# Patient Record
Sex: MALE | Race: WHITE | NOT HISPANIC OR LATINO | Employment: FULL TIME | URBAN - METROPOLITAN AREA
[De-identification: names, ages, dates, MRNs, and addresses within clinical notes are randomized per-mention and may not be internally consistent; named-entity substitution may affect disease eponyms.]

---

## 2017-01-09 RX ORDER — TAMSULOSIN HYDROCHLORIDE 0.4 MG/1
CAPSULE ORAL
Qty: 90 CAPSULE | Refills: 2 | Status: SHIPPED | OUTPATIENT
Start: 2017-01-09 | End: 2017-01-20 | Stop reason: SDUPTHER

## 2017-01-09 RX ORDER — ATORVASTATIN CALCIUM 10 MG/1
TABLET, FILM COATED ORAL
Qty: 90 TABLET | Refills: 2 | Status: SHIPPED | OUTPATIENT
Start: 2017-01-09 | End: 2017-01-20 | Stop reason: SDUPTHER

## 2017-01-20 ENCOUNTER — OFFICE VISIT (OUTPATIENT)
Dept: INTERNAL MEDICINE | Facility: CLINIC | Age: 52
End: 2017-01-20
Payer: COMMERCIAL

## 2017-01-20 ENCOUNTER — LAB VISIT (OUTPATIENT)
Dept: LAB | Facility: HOSPITAL | Age: 52
End: 2017-01-20
Attending: FAMILY MEDICINE
Payer: COMMERCIAL

## 2017-01-20 VITALS
TEMPERATURE: 99 F | SYSTOLIC BLOOD PRESSURE: 100 MMHG | OXYGEN SATURATION: 95 % | WEIGHT: 193.56 LBS | DIASTOLIC BLOOD PRESSURE: 70 MMHG | BODY MASS INDEX: 32.25 KG/M2 | HEART RATE: 93 BPM | HEIGHT: 65 IN

## 2017-01-20 DIAGNOSIS — H69.91 ETD (EUSTACHIAN TUBE DYSFUNCTION), RIGHT: ICD-10-CM

## 2017-01-20 DIAGNOSIS — E78.5 HYPERLIPIDEMIA, UNSPECIFIED HYPERLIPIDEMIA TYPE: ICD-10-CM

## 2017-01-20 DIAGNOSIS — Z00.00 ANNUAL PHYSICAL EXAM: Primary | ICD-10-CM

## 2017-01-20 DIAGNOSIS — Z12.5 PROSTATE CANCER SCREENING: ICD-10-CM

## 2017-01-20 DIAGNOSIS — F17.200 SMOKER: ICD-10-CM

## 2017-01-20 DIAGNOSIS — I10 HYPERTENSION, ESSENTIAL: ICD-10-CM

## 2017-01-20 DIAGNOSIS — Z00.00 ANNUAL PHYSICAL EXAM: ICD-10-CM

## 2017-01-20 DIAGNOSIS — R80.9 PROTEINURIA, UNSPECIFIED TYPE: ICD-10-CM

## 2017-01-20 LAB
ALBUMIN SERPL BCP-MCNC: 3.8 G/DL
ALP SERPL-CCNC: 81 U/L
ALT SERPL W/O P-5'-P-CCNC: 41 U/L
ANION GAP SERPL CALC-SCNC: 9 MMOL/L
AST SERPL-CCNC: 24 U/L
BILIRUB SERPL-MCNC: 0.5 MG/DL
BILIRUB UR QL STRIP: NEGATIVE
BUN SERPL-MCNC: 12 MG/DL
CALCIUM SERPL-MCNC: 9.9 MG/DL
CHLORIDE SERPL-SCNC: 107 MMOL/L
CHOLEST/HDLC SERPL: 6.4 {RATIO}
CLARITY UR REFRACT.AUTO: CLEAR
CO2 SERPL-SCNC: 22 MMOL/L
COLOR UR AUTO: YELLOW
COMPLEXED PSA SERPL-MCNC: 3.6 NG/ML
CREAT SERPL-MCNC: 0.9 MG/DL
EST. GFR  (AFRICAN AMERICAN): >60 ML/MIN/1.73 M^2
EST. GFR  (NON AFRICAN AMERICAN): >60 ML/MIN/1.73 M^2
GLUCOSE SERPL-MCNC: 101 MG/DL
GLUCOSE UR QL STRIP: NEGATIVE
HDL/CHOLESTEROL RATIO: 15.5 %
HDLC SERPL-MCNC: 206 MG/DL
HDLC SERPL-MCNC: 32 MG/DL
HGB UR QL STRIP: NEGATIVE
KETONES UR QL STRIP: NEGATIVE
LDLC SERPL CALC-MCNC: 94.6 MG/DL
LEUKOCYTE ESTERASE UR QL STRIP: NEGATIVE
NITRITE UR QL STRIP: NEGATIVE
NONHDLC SERPL-MCNC: 174 MG/DL
PH UR STRIP: 7 [PH] (ref 5–8)
POTASSIUM SERPL-SCNC: 3.9 MMOL/L
PROT SERPL-MCNC: 7.6 G/DL
PROT UR QL STRIP: NEGATIVE
SODIUM SERPL-SCNC: 138 MMOL/L
SP GR UR STRIP: 1.02 (ref 1–1.03)
TRIGL SERPL-MCNC: 397 MG/DL
URN SPEC COLLECT METH UR: NORMAL
UROBILINOGEN UR STRIP-ACNC: NEGATIVE EU/DL

## 2017-01-20 PROCEDURE — 80061 LIPID PANEL: CPT

## 2017-01-20 PROCEDURE — 3078F DIAST BP <80 MM HG: CPT | Mod: S$GLB,,, | Performed by: FAMILY MEDICINE

## 2017-01-20 PROCEDURE — 99396 PREV VISIT EST AGE 40-64: CPT | Mod: S$GLB,,, | Performed by: FAMILY MEDICINE

## 2017-01-20 PROCEDURE — 80053 COMPREHEN METABOLIC PANEL: CPT

## 2017-01-20 PROCEDURE — 84153 ASSAY OF PSA TOTAL: CPT

## 2017-01-20 PROCEDURE — 36415 COLL VENOUS BLD VENIPUNCTURE: CPT

## 2017-01-20 PROCEDURE — 99999 PR PBB SHADOW E&M-EST. PATIENT-LVL IV: CPT | Mod: PBBFAC,,, | Performed by: FAMILY MEDICINE

## 2017-01-20 PROCEDURE — 81003 URINALYSIS AUTO W/O SCOPE: CPT

## 2017-01-20 PROCEDURE — 3074F SYST BP LT 130 MM HG: CPT | Mod: S$GLB,,, | Performed by: FAMILY MEDICINE

## 2017-01-20 RX ORDER — ATORVASTATIN CALCIUM 10 MG/1
10 TABLET, FILM COATED ORAL DAILY
Qty: 90 TABLET | Refills: 2 | Status: SHIPPED | OUTPATIENT
Start: 2017-01-20 | End: 2017-10-02 | Stop reason: SDUPTHER

## 2017-01-20 RX ORDER — LISINOPRIL 10 MG/1
10 TABLET ORAL DAILY
Qty: 90 TABLET | Refills: 3 | Status: SHIPPED | OUTPATIENT
Start: 2017-01-20 | End: 2018-02-12 | Stop reason: SDUPTHER

## 2017-01-20 RX ORDER — TAMSULOSIN HYDROCHLORIDE 0.4 MG/1
0.4 CAPSULE ORAL DAILY
Qty: 90 CAPSULE | Refills: 2 | Status: SHIPPED | OUTPATIENT
Start: 2017-01-20 | End: 2017-02-25 | Stop reason: SDUPTHER

## 2017-01-20 NOTE — MR AVS SNAPSHOT
[facility]  [address]  [place] [address]  Phone: [phone]  Fax: [phone]                  Nanda Colin III   2017 3:20 PM   Office Visit    Description:  Male : 1965   Provider:  Horace Weber MD   Department:  [facility]           Reason for Visit     Annual Exam           Diagnoses this Visit        Comments    Annual physical exam    -  Primary     Hypertension, essential         Hyperlipidemia, unspecified hyperlipidemia type         Smoker         Proteinuria, unspecified type         Prostate cancer screening         ETD (eustachian tube dysfunction), right                To Do List           Goals (5 Years of Data)     None      Follow-Up and Disposition     Return in about 1 year (around 2018) for annual physical exam, Call or message through Portal for status update., Keep appointments with specialty providers..    Follow-up and Disposition History       These Medications        Disp Refills Start End    atorvastatin (LIPITOR) 10 MG tablet 90 tablet 2 2017     Take 1 tablet (10 mg total) by mouth once daily. - Oral    Pharmacy: [facility]/pharmacy [address] [place] [address]. Ph #: [phone]       lisinopril 10 MG tablet 90 tablet 3 2017     Take 1 tablet (10 mg total) by mouth once daily. - Oral    Pharmacy: [facility]/pharmacy [address] [place] [address]. Ph #: [phone]       tamsulosin (FLOMAX) 0.4 mg Cp24 90 capsule 2 2017     Take 1 capsule (0.4 mg total) by mouth once daily. - Oral    Pharmacy: [facility]/pharmacy [address] [place] [address] [address] [place] [address]. Ph #: [phone]         [facility]     [facility] On Call Nurse Care Line -  Assistance  Registered nurses in the [facility] On Call Center provide clinical advisement, health education, appointment booking, and other advisory services.  Call for this free service at [phone].             Medications  "          Message regarding Medications     Verify the changes and/or additions to your medication regime listed below are the same as discussed with your clinician today.  If any of these changes or additions are incorrect, please notify your healthcare provider.        CHANGE how you are taking these medications     Start Taking Instead of    atorvastatin (LIPITOR) 10 MG tablet atorvastatin (LIPITOR) 10 MG tablet    Dosage:  Take 1 tablet (10 mg total) by mouth once daily. Dosage:  TAKE 1 TABLET (10 MG TOTAL) BY MOUTH ONCE DAILY.    Reason for Change:  Reorder     tamsulosin (FLOMAX) 0.4 mg Cp24 tamsulosin (FLOMAX) 0.4 mg Cp24    Dosage:  Take 1 capsule (0.4 mg total) by mouth once daily. Dosage:  TAKE 1 CAPSULE (0.4 MG TOTAL) BY MOUTH ONCE DAILY.    Reason for Change:  Reorder            Verify that the below list of medications is an accurate representation of the medications you are currently taking.  If none reported, the list may be blank. If incorrect, please contact your healthcare provider. Carry this list with you in case of emergency.           Current Medications     atorvastatin (LIPITOR) 10 MG tablet Take 1 tablet (10 mg total) by mouth once daily.    lisinopril 10 MG tablet Take 1 tablet (10 mg total) by mouth once daily.    tamsulosin (FLOMAX) 0.4 mg Cp24 Take 1 capsule (0.4 mg total) by mouth once daily.    triamcinolone acetonide 0.1% (KENALOG) 0.1 % cream Apply topically 2 (two) times daily.           Clinical Reference Information           Vital Signs - Last Recorded  Most recent update: 1/20/2017  3:52 PM by Lida Hampton MA    BP Pulse Temp Ht Wt SpO2    100/70 93 98.5 °F (36.9 °C) 5' 5" (1.651 m) 87.8 kg (193 lb 9 oz) 95%    BMI                32.21 kg/m2          Blood Pressure          Most Recent Value    BP  100/70      Allergies as of 1/20/2017     No Known Allergies      Immunizations Administered on Date of Encounter - 1/20/2017     None      Orders Placed During Today's Visit      " Normal Orders This Visit    Ambulatory referral to ENT     Urinalysis     Future Labs/Procedures Expected by Expires    Comprehensive metabolic panel  1/20/2017 1/20/2018    Lipid panel  1/20/2017 1/20/2018    PSA, Screening  1/20/2017 1/20/2018      Instructions      http://www.nhlbi.nih.gov/health/health-topics/topics/hbc    http://www.nhlbi.nih.gov/health/educational/lose_wt/index.htm    http://www.nhlbi.nih.gov/files/docs/public/heart/hbp_low.pdf

## 2017-01-20 NOTE — PATIENT INSTRUCTIONS
http://www.nhlbi.nih.gov/health/health-topics/topics/hbc    http://www.nhlbi.nih.gov/health/educational/lose_wt/index.htm    http://www.nhlbi.nih.gov/files/docs/public/heart/hbp_low.pdf

## 2017-01-20 NOTE — PROGRESS NOTES
Subjective:       Patient ID: Nanda Colin III is a 51 y.o. male.    Chief Complaint: Annual Exam    HPI Comments: Established patient for an annual wellness check/physical exam. No specific complaints, please see ROS.      Review of Systems   Constitutional: Negative for chills, fatigue and fever.   HENT: Negative for congestion and trouble swallowing.    Eyes: Negative for redness.   Respiratory: Negative for cough, chest tightness and shortness of breath.    Cardiovascular: Negative for chest pain, palpitations and leg swelling.   Gastrointestinal: Negative for abdominal pain and blood in stool.   Genitourinary: Negative for hematuria.   Musculoskeletal: Negative for arthralgias, back pain, gait problem, joint swelling, myalgias and neck pain.   Skin: Negative for color change and rash.   Neurological: Negative for tremors, speech difficulty, weakness, numbness and headaches.   Hematological: Negative for adenopathy. Does not bruise/bleed easily.   Psychiatric/Behavioral: Negative for behavioral problems, confusion and sleep disturbance. The patient is not nervous/anxious.        Objective:      Physical Exam   Constitutional: He appears well-developed and well-nourished.   HENT:   Head: Normocephalic.   Right Ear: External ear and ear canal normal. Tympanic membrane is injected and retracted. Decreased hearing is noted.   Left Ear: Tympanic membrane, external ear and ear canal normal.   Mouth/Throat: Oropharynx is clear and moist.   Eyes: Pupils are equal, round, and reactive to light.   Neck: Normal range of motion. Neck supple. Carotid bruit is not present. No thyromegaly present.   Cardiovascular: Normal rate, regular rhythm, normal heart sounds and intact distal pulses.  Exam reveals no gallop and no friction rub.    No murmur heard.  Pulmonary/Chest: Effort normal and breath sounds normal.   Abdominal: Soft. Bowel sounds are normal. He exhibits no distension and no mass. There is no tenderness. There is  no rebound and no guarding.   Musculoskeletal: Normal range of motion. He exhibits no edema or tenderness.   Lymphadenopathy:     He has no cervical adenopathy.   Neurological: He is alert. He has normal strength. He displays normal reflexes. No cranial nerve deficit or sensory deficit. Coordination and gait normal.   Skin: Skin is warm and dry.   Psychiatric: He has a normal mood and affect. His behavior is normal. Judgment and thought content normal.   Nursing note and vitals reviewed.      Assessment:       1. Annual physical exam    2. Hypertension, essential    3. Hyperlipidemia, unspecified hyperlipidemia type    4. Smoker    5. Proteinuria, unspecified type    6. Prostate cancer screening    7. ETD (eustachian tube dysfunction), right        Plan:   Nanda was seen today for annual exam.    Diagnoses and all orders for this visit:    Annual physical exam  -     Comprehensive metabolic panel; Future  -     Lipid panel; Future  -     PSA, Screening; Future  -     Urinalysis    Hypertension, essential    Hyperlipidemia, unspecified hyperlipidemia type    Smoker    Proteinuria, unspecified type  -     Urinalysis    Prostate cancer screening  -     PSA, Screening; Future    ETD (eustachian tube dysfunction), right  -     Ambulatory referral to ENT    Other orders  -     atorvastatin (LIPITOR) 10 MG tablet; Take 1 tablet (10 mg total) by mouth once daily.  -     lisinopril 10 MG tablet; Take 1 tablet (10 mg total) by mouth once daily.  -     tamsulosin (FLOMAX) 0.4 mg Cp24; Take 1 capsule (0.4 mg total) by mouth once daily.      See meds, orders, follow up, routing and instructions sections of encounter.  A 51-year-old established male patient, annual physical.  He had a cold and has   stuffiness and/or pain in the right ear with decreased hearing.  He has a   negative Valsalva on that side with redness and retraction.  Discussed diet,   exercise, medications, etc.  ENT consult.      ANTONIO/IN  dd: 01/20/2017  18:21:07 (CST)  td: 01/21/2017 05:11:33 (CST)  Doc ID   #[id number]  Job ID #[id number]    CC:

## 2017-02-06 ENCOUNTER — TELEPHONE (OUTPATIENT)
Dept: INTERNAL MEDICINE | Facility: CLINIC | Age: 52
End: 2017-02-06

## 2017-02-07 ENCOUNTER — TELEPHONE (OUTPATIENT)
Dept: INTERNAL MEDICINE | Facility: CLINIC | Age: 52
End: 2017-02-07

## 2017-02-08 ENCOUNTER — TELEPHONE (OUTPATIENT)
Dept: INTERNAL MEDICINE | Facility: CLINIC | Age: 52
End: 2017-02-08

## 2017-02-08 DIAGNOSIS — R97.20 RISING PSA LEVEL: Primary | ICD-10-CM

## 2017-02-08 DIAGNOSIS — E78.5 HYPERLIPIDEMIA, UNSPECIFIED HYPERLIPIDEMIA TYPE: ICD-10-CM

## 2017-02-08 NOTE — TELEPHONE ENCOUNTER
Spoke with patient scheduled his appt with Urologist and lab appt in 6 months and appt letters were mailed.  Message routed to Dr. Weber.

## 2017-02-08 NOTE — TELEPHONE ENCOUNTER
Called patient and discussed labs and or test results. Patient expressed understanding and had the opportunity to ask questions. Any questions were answered. See meds, orders, follow up and instructions sections of encounter.    Specific issues include:  Rising PSA - urology consult    Discussed cholesterol -discussed diet, exercise and fishoil caps - recheck in 6 months

## 2017-02-25 RX ORDER — TAMSULOSIN HYDROCHLORIDE 0.4 MG/1
CAPSULE ORAL
Qty: 90 CAPSULE | Refills: 0 | Status: SHIPPED | OUTPATIENT
Start: 2017-02-25 | End: 2017-10-05 | Stop reason: SDUPTHER

## 2017-06-13 ENCOUNTER — TELEPHONE (OUTPATIENT)
Dept: SMOKING CESSATION | Facility: CLINIC | Age: 52
End: 2017-06-13

## 2017-06-14 ENCOUNTER — TELEPHONE (OUTPATIENT)
Dept: SMOKING CESSATION | Facility: CLINIC | Age: 52
End: 2017-06-14

## 2017-06-15 ENCOUNTER — TELEPHONE (OUTPATIENT)
Dept: SMOKING CESSATION | Facility: CLINIC | Age: 52
End: 2017-06-15

## 2017-08-21 ENCOUNTER — OFFICE VISIT (OUTPATIENT)
Dept: UROLOGY | Facility: CLINIC | Age: 52
End: 2017-08-21
Payer: COMMERCIAL

## 2017-08-21 VITALS
DIASTOLIC BLOOD PRESSURE: 75 MMHG | HEART RATE: 106 BPM | WEIGHT: 193 LBS | HEIGHT: 65 IN | SYSTOLIC BLOOD PRESSURE: 112 MMHG | BODY MASS INDEX: 32.15 KG/M2

## 2017-08-21 DIAGNOSIS — N40.1 BPH WITH URINARY OBSTRUCTION: ICD-10-CM

## 2017-08-21 DIAGNOSIS — Z12.5 PROSTATE CANCER SCREENING: ICD-10-CM

## 2017-08-21 DIAGNOSIS — N13.8 BPH WITH URINARY OBSTRUCTION: ICD-10-CM

## 2017-08-21 DIAGNOSIS — R79.9 NONSPECIFIC FINDINGS ON EXAMINATION OF BLOOD: Primary | ICD-10-CM

## 2017-08-21 LAB
BILIRUB SERPL-MCNC: ABNORMAL MG/DL
BLOOD URINE, POC: ABNORMAL
COLOR, POC UA: ABNORMAL
GLUCOSE UR QL STRIP: ABNORMAL
KETONES UR QL STRIP: ABNORMAL
LEUKOCYTE ESTERASE URINE, POC: ABNORMAL
NITRITE, POC UA: ABNORMAL
PH, POC UA: 5
PROTEIN, POC: ABNORMAL
SPECIFIC GRAVITY, POC UA: ABNORMAL
UROBILINOGEN, POC UA: ABNORMAL

## 2017-08-21 PROCEDURE — 87086 URINE CULTURE/COLONY COUNT: CPT

## 2017-08-21 PROCEDURE — 51798 US URINE CAPACITY MEASURE: CPT | Mod: S$GLB,,, | Performed by: UROLOGY

## 2017-08-21 PROCEDURE — 99215 OFFICE O/P EST HI 40 MIN: CPT | Mod: 25,S$GLB,, | Performed by: UROLOGY

## 2017-08-21 PROCEDURE — 99999 PR PBB SHADOW E&M-EST. PATIENT-LVL III: CPT | Mod: PBBFAC,,, | Performed by: UROLOGY

## 2017-08-21 PROCEDURE — 81001 URINALYSIS AUTO W/SCOPE: CPT | Mod: S$GLB,,, | Performed by: UROLOGY

## 2017-08-21 PROCEDURE — 3078F DIAST BP <80 MM HG: CPT | Mod: S$GLB,,, | Performed by: UROLOGY

## 2017-08-21 PROCEDURE — 3074F SYST BP LT 130 MM HG: CPT | Mod: S$GLB,,, | Performed by: UROLOGY

## 2017-08-21 PROCEDURE — 3008F BODY MASS INDEX DOCD: CPT | Mod: S$GLB,,, | Performed by: UROLOGY

## 2017-08-21 NOTE — PROGRESS NOTES
HPI:  Nanda Colin III is a 52 y.o. year old male that  presents with   Chief Complaint   Patient presents with    Other     referral by    .  Patient referred by his PCP for slight rise though still normal PSA.  PSAs as per chart below.  There is no family history of prostate cancer    Lab Results   Component Value Date    PSA 3.6 01/20/2017     Lab Results   Component Value Date    PSA 3.6 01/20/2017     Lab Results   Component Value Date    PSA 3.6 01/20/2017    PSA 2.1 02/08/2016    PSA 1.6 07/25/2014    PSA 1.75 07/31/2012     Patient has has been on Flomax for over year.  He has in-between 2 week strength of stream with occasional straining.  He has nocturia ×1 with occasional urgency.  Bladder scan postvoid residual the office today is okay at 20 cc.    Urologic surgery the patient is had his vasectomy.  No family history of urological cancer     is new to me however has been seen by [facility] urology in March 2016 which time he had a normal prostate exam.  Ask was continued.  The note from his PCP from January of this year as well male exam showing hypertension.    Past Medical History:   Diagnosis Date    BPH (benign prostatic hypertrophy)     Hernia     Hyperlipidemia     Hypertension, essential 12/30/2015    Umbilical hernia      Social History     Social History    Marital status:      Spouse name: Lakia    Number of children: 2    Years of education: N/A     Occupational History    superintendent       [employer]     Social History Main Topics    Smoking status: Former Smoker     Quit date: 3/2/2016    Smokeless tobacco: Not on file    Alcohol use 0.0 oz/week      Comment: socially    Drug use: No    Sexual activity: Yes     Partners: Female     Other Topics Concern    Not on file     Social History Narrative    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     Past Surgical History:   Procedure Laterality Date    VASECTOMY      WISDOM TOOTH  "EXTRACTION       Family History   Problem Relation Age of Onset    Stroke Father     Heart disease Paternal Uncle     Kidney disease Paternal Uncle     Depression Paternal Uncle     Cancer Maternal Grandfather     COPD Maternal Grandfather     Cancer Paternal Grandmother     COPD Paternal Aunt            Review of Systems  The patient has no chest pains.  The patient has no shortness of breath  Patient wears        glasses.  All other review of systems are negative.      Physical Exam:  /75   Pulse 106   Ht 5' 5" (1.651 m)   Wt 87.5 kg (193 lb)   BMI 32.12 kg/m²   General appearance: alert, cooperative, no distress  Constitutional:Oriented to person, place, and time.appears well-developed and well-nourished.   HEENT: Normocephalic, atraumatic, neck symmetrical, no nasal discharge   Eyes: conjunctivae/corneas clear, PERRL, EOM's intact  Lungs: clear to auscultation bilaterally, no dullness to percussion bilaterally  Heart: regular rate and rhythm without rub; no displacement of the PMI   Abdomen: soft, non-tender; bowel sounds normoactive; no organomegaly  :Penis/perineum without lesions, scrotum without rash/cysts, epididymis nontender bilaterally, urethral meatus in normal location normal size, no penile plaques palpated, prostate:  Smooth enlarged and benign feeling                     seminal vesicles not palpated.  No rectal masses, sphincter tone normal.  Testes equal in size without masses  Extremities: extremities symmetric; no clubbing, cyanosis, or edema  Integument: Skin color, texture, turgor normal; no rashes; hair distrubution normal  Neurologic: Alert and oriented X 3, normal strength, normal coordination and gait  Psychiatric: no pressured speech; normal affect; no evidence of impaired cognition     LABS:    Complete Blood Count  Lab Results   Component Value Date    RBC 5.79 06/25/2016    HGB 16.6 06/25/2016    HCT 48.6 06/25/2016    MCV 84 06/25/2016    MCH 28.7 06/25/2016    " MCHC 34.2 06/25/2016    RDW 14.1 06/25/2016     06/25/2016    MPV 11.3 06/25/2016    GRAN 4.4 06/25/2016    GRAN 53.2 06/25/2016    LYMPH 3.0 06/25/2016    LYMPH 36.1 06/25/2016    MONO 0.6 06/25/2016    MONO 7.6 06/25/2016    EOS 0.2 06/25/2016    BASO 0.05 06/25/2016    EOSINOPHIL 2.1 06/25/2016    BASOPHIL 0.6 06/25/2016    DIFFMETHOD Automated 06/25/2016       Comprehensive Metabolic Panel  Lab Results   Component Value Date     01/20/2017    BUN 12 01/20/2017    CREATININE 0.9 01/20/2017     01/20/2017    K 3.9 01/20/2017     01/20/2017    PROT 7.6 01/20/2017    ALBUMIN 3.8 01/20/2017    BILITOT 0.5 01/20/2017    AST 24 01/20/2017    ALKPHOS 81 01/20/2017    CO2 22 (L) 01/20/2017    ALT 41 01/20/2017    ANIONGAP 9 01/20/2017    EGFRNONAA >60.0 01/20/2017    ESTGFRAFRICA >60.0 01/20/2017       PSA  Lab Results   Component Value Date    PSA 3.6 01/20/2017         Assessment:    ICD-10-CM ICD-9-CM    1. Nonspecific findings on examination of blood R79.9 790.99     Rise in though still normal PSA   2. Prostate cancer screening Z12.5 V76.44 PSA, Screening      PSA, Screening   3. BPH with urinary obstruction N40.1 600.01 POCT urinalysis, dipstick or tablet reag    N13.8 599.69 Urine culture      POCT Bladder Scan     The primary encounter diagnosis was Nonspecific findings on examination of blood. Diagnoses of Prostate cancer screening and BPH with urinary obstruction were also pertinent to this visit.      Plan: 1.  Mild rise though still normal PSA.  Plan.  Options of management including proceeding to prostate biopsy versus serial observation discussed.  Patient wants serial observation.  Due to strenuous prostate exam today, await 1 week and check PSA.  We will contact the patient with any significant finding.  Twice follow-up 6 months with recheck    #2.  Prostate cancer screening.  Plan.  As per #1    #3.  BPH.  Plan.  Continue Flomax.  Discussed risks and benefits of addition of  Proscar.  Patient does not want addition of Proscar at this time  Orders Placed This Encounter   Procedures    Urine culture    PSA, Screening    PSA, Screening    POCT urinalysis, dipstick or tablet reag    POCT Bladder Scan           Yoshi Gutierrez MD    PLEASE NOTE:  Please be advised that portions of this note were dictated using voice recognition software and may contain dictation related errors in spelling/grammar/appropriate pronouns/syntax or other errors that might have not been found and or corrected on text review.

## 2017-08-22 ENCOUNTER — OFFICE VISIT (OUTPATIENT)
Dept: INTERNAL MEDICINE | Facility: CLINIC | Age: 52
End: 2017-08-22
Attending: FAMILY MEDICINE
Payer: COMMERCIAL

## 2017-08-22 VITALS
SYSTOLIC BLOOD PRESSURE: 122 MMHG | HEIGHT: 65 IN | DIASTOLIC BLOOD PRESSURE: 80 MMHG | OXYGEN SATURATION: 96 % | HEART RATE: 90 BPM | WEIGHT: 192.5 LBS | BODY MASS INDEX: 32.07 KG/M2

## 2017-08-22 DIAGNOSIS — F17.200 SMOKER: ICD-10-CM

## 2017-08-22 DIAGNOSIS — Z00.00 ANNUAL PHYSICAL EXAM: Primary | ICD-10-CM

## 2017-08-22 DIAGNOSIS — H69.93 ETD (EUSTACHIAN TUBE DYSFUNCTION), BILATERAL: ICD-10-CM

## 2017-08-22 DIAGNOSIS — I10 HYPERTENSION, ESSENTIAL: ICD-10-CM

## 2017-08-22 DIAGNOSIS — E78.5 HYPERLIPIDEMIA, UNSPECIFIED HYPERLIPIDEMIA TYPE: ICD-10-CM

## 2017-08-22 DIAGNOSIS — H91.90 HEARING LOSS, UNSPECIFIED HEARING LOSS TYPE, UNSPECIFIED LATERALITY: ICD-10-CM

## 2017-08-22 LAB — BACTERIA UR CULT: NO GROWTH

## 2017-08-22 PROCEDURE — 3079F DIAST BP 80-89 MM HG: CPT | Mod: S$GLB,,, | Performed by: FAMILY MEDICINE

## 2017-08-22 PROCEDURE — 99396 PREV VISIT EST AGE 40-64: CPT | Mod: S$GLB,,, | Performed by: FAMILY MEDICINE

## 2017-08-22 PROCEDURE — 99999 PR PBB SHADOW E&M-EST. PATIENT-LVL IV: CPT | Mod: PBBFAC,,, | Performed by: FAMILY MEDICINE

## 2017-08-22 PROCEDURE — 3008F BODY MASS INDEX DOCD: CPT | Mod: S$GLB,,, | Performed by: FAMILY MEDICINE

## 2017-08-22 PROCEDURE — 3074F SYST BP LT 130 MM HG: CPT | Mod: S$GLB,,, | Performed by: FAMILY MEDICINE

## 2017-08-22 RX ORDER — TRIAMCINOLONE ACETONIDE 1 MG/G
CREAM TOPICAL 2 TIMES DAILY
Qty: 80 G | Refills: 1 | Status: SHIPPED | OUTPATIENT
Start: 2017-08-22 | End: 2018-03-12

## 2017-08-22 NOTE — PROGRESS NOTES
Subjective:       Patient ID: Nanda Colin III is a 52 y.o. male.    Chief Complaint: Follow-up    Established patient for an annual wellness check/physical exam. No specific complaints, please see ROS.        Review of Systems   Constitutional: Negative for chills, fatigue and fever.   HENT: Positive for ear pain and hearing loss. Negative for congestion and trouble swallowing.    Eyes: Negative for redness.   Respiratory: Negative for cough, chest tightness and shortness of breath.    Cardiovascular: Negative for chest pain, palpitations and leg swelling.   Gastrointestinal: Negative for abdominal pain and blood in stool.   Genitourinary: Negative for hematuria.   Musculoskeletal: Negative for arthralgias, back pain, gait problem, joint swelling, myalgias and neck pain.   Skin: Negative for color change and rash.   Neurological: Negative for tremors, speech difficulty, weakness, numbness and headaches.   Hematological: Negative for adenopathy. Does not bruise/bleed easily.   Psychiatric/Behavioral: Negative for behavioral problems, confusion and sleep disturbance. The patient is not nervous/anxious.        Objective:      Physical Exam   Constitutional: He appears well-developed and well-nourished.   HENT:   Head: Normocephalic.   Right Ear: External ear and ear canal normal. Tympanic membrane is injected.   Left Ear: Tympanic membrane, external ear and ear canal normal.   Mouth/Throat: Oropharynx is clear and moist.   Eyes: Pupils are equal, round, and reactive to light.   Neck: Normal range of motion. Neck supple. Carotid bruit is not present. No thyromegaly present.   Cardiovascular: Normal rate, regular rhythm, normal heart sounds and intact distal pulses.  Exam reveals no gallop and no friction rub.    No murmur heard.  Pulmonary/Chest: Effort normal and breath sounds normal.   Abdominal: Soft. Bowel sounds are normal. He exhibits no distension and no mass. There is no tenderness. There is no rebound and  no guarding.   Musculoskeletal: Normal range of motion. He exhibits no edema or tenderness.   Lymphadenopathy:     He has no cervical adenopathy.   Neurological: He is alert. He has normal strength. He displays normal reflexes. No cranial nerve deficit or sensory deficit. Coordination and gait normal.   Skin: Skin is warm and dry.   Psychiatric: He has a normal mood and affect. His behavior is normal. Judgment and thought content normal.   Nursing note and vitals reviewed.      Assessment:       1. Annual physical exam    2. Hypertension, essential    3. Hyperlipidemia, unspecified hyperlipidemia type    4. Smoker    5. ETD (eustachian tube dysfunction), bilateral    6. Hearing loss, unspecified hearing loss type, unspecified laterality        Plan:   Nanda was seen today for follow-up.    Diagnoses and all orders for this visit:    Annual physical exam    Hypertension, essential    Hyperlipidemia, unspecified hyperlipidemia type    Smoker  -     Ambulatory referral to Smoking Cessation Program    ETD (eustachian tube dysfunction), bilateral  -     Ambulatory referral to ENT    Hearing loss, unspecified hearing loss type, unspecified laterality  -     Ambulatory referral to ENT    Other orders  -     triamcinolone acetonide 0.1% (KENALOG) 0.1 % cream; Apply topically 2 (two) times daily.      See meds, orders, follow up, routing and instructions sections of encounter.  A 52-year-old patient.  He is in for a physical approximately eight months after   his last.  He is taking his medications.  Still smoking.  Labs reviewed.  Dr. Gutierrez following PSA.    RECOMMENDATIONS:  Check AST and lipid panel now.  Follow up in six months for   physical.  Keep followups with Dr. Gutierrez in Urology.  Diet and exercise   discussed.  Recounseled smoking cessation.      ANTONIO/HN  dd: 08/22/2017 16:53:57 (CDT)  td: 08/23/2017 01:55:31 (CDT)  Doc ID   #[id number]  Job ID #[id number]    CC:

## 2017-08-29 ENCOUNTER — LAB VISIT (OUTPATIENT)
Dept: LAB | Facility: HOSPITAL | Age: 52
End: 2017-08-29
Attending: FAMILY MEDICINE
Payer: COMMERCIAL

## 2017-08-29 DIAGNOSIS — E78.5 HYPERLIPIDEMIA, UNSPECIFIED HYPERLIPIDEMIA TYPE: ICD-10-CM

## 2017-08-29 DIAGNOSIS — Z12.5 PROSTATE CANCER SCREENING: ICD-10-CM

## 2017-08-29 LAB
AST SERPL-CCNC: 20 U/L
CHOLEST/HDLC SERPL: 7.7 {RATIO}
COMPLEXED PSA SERPL-MCNC: 2.5 NG/ML
HDL/CHOLESTEROL RATIO: 12.9 %
HDLC SERPL-MCNC: 209 MG/DL
HDLC SERPL-MCNC: 27 MG/DL
LDLC SERPL CALC-MCNC: 144.4 MG/DL
NONHDLC SERPL-MCNC: 182 MG/DL
TRIGL SERPL-MCNC: 188 MG/DL

## 2017-08-29 PROCEDURE — 84153 ASSAY OF PSA TOTAL: CPT

## 2017-08-29 PROCEDURE — 84450 TRANSFERASE (AST) (SGOT): CPT

## 2017-08-29 PROCEDURE — 80061 LIPID PANEL: CPT

## 2017-08-29 PROCEDURE — 36415 COLL VENOUS BLD VENIPUNCTURE: CPT

## 2017-08-31 ENCOUNTER — CLINICAL SUPPORT (OUTPATIENT)
Dept: SMOKING CESSATION | Facility: CLINIC | Age: 52
End: 2017-08-31
Payer: COMMERCIAL

## 2017-08-31 VITALS
HEART RATE: 81 BPM | BODY MASS INDEX: 32.06 KG/M2 | WEIGHT: 192.69 LBS | DIASTOLIC BLOOD PRESSURE: 78 MMHG | SYSTOLIC BLOOD PRESSURE: 102 MMHG

## 2017-08-31 DIAGNOSIS — F17.210 NICOTINE DEPENDENCE, CIGARETTES, UNCOMPLICATED: Primary | ICD-10-CM

## 2017-08-31 PROCEDURE — 99404 PREV MED CNSL INDIV APPRX 60: CPT | Mod: S$GLB,,,

## 2017-08-31 NOTE — PROGRESS NOTES
8/31/17      See Smoking Cessation Smart Form    Additional Interventions:  · Recommended patient participate in Smoking Cessation Group .  · Discussed triggers and planning for quit date.  · Given patient education handouts from [employer] Chest [employer]ician Tool Kit #3  · Educated patient about and gave patient education handouts from  [employer] Drug Information on: Wellbutrin, Chantix, NRT  · Provided phone number to reach [facility] CTTS (Certified Tobacco Treatment Specialist) for future assistance and numbers to 24/7 Quit lines.

## 2017-09-05 ENCOUNTER — TELEPHONE (OUTPATIENT)
Dept: SMOKING CESSATION | Facility: CLINIC | Age: 52
End: 2017-09-05

## 2017-09-05 DIAGNOSIS — F17.210 NICOTINE DEPENDENCE, CIGARETTES, UNCOMPLICATED: Primary | ICD-10-CM

## 2017-09-05 RX ORDER — VARENICLINE TARTRATE 0.5 (11)-1
KIT ORAL
Qty: 1 PACKAGE | Refills: 0 | Status: SHIPPED | OUTPATIENT
Start: 2017-09-05 | End: 2017-10-02 | Stop reason: DRUGHIGH

## 2017-09-05 RX ORDER — DIPHENHYDRAMINE HCL 25 MG
4 CAPSULE ORAL
Qty: 220 EACH | Refills: 0 | Status: SHIPPED | OUTPATIENT
Start: 2017-09-05 | End: 2019-09-12 | Stop reason: ALTCHOICE

## 2017-09-05 NOTE — TELEPHONE ENCOUNTER
9/5/17    10:25 a.m.    Telephone call to [employer] for prior authorization of Chantix Rx.  This was obtained, confirmation # [id number] which will be effective until 9/5/2018.  Called patient and informed him of this.

## 2017-09-12 ENCOUNTER — CLINICAL SUPPORT (OUTPATIENT)
Dept: SMOKING CESSATION | Facility: CLINIC | Age: 52
End: 2017-09-12
Payer: COMMERCIAL

## 2017-09-12 DIAGNOSIS — F17.210 NICOTINE DEPENDENCE, CIGARETTES, UNCOMPLICATED: Primary | ICD-10-CM

## 2017-09-12 PROCEDURE — 90853 GROUP PSYCHOTHERAPY: CPT | Mod: S$GLB,,,

## 2017-09-13 NOTE — PROGRESS NOTES
Smoking Cessation Group Session #1    Site:  [place] [facility]  Date:  9/12/17  Clinical Status of Patient: Outpatient   Length of Service and Code: 90 minutes - 52919   Number in Attendance: 5  Group Activities/Focus of Group:  orientation, client introductions, completion of TCRS (Tobacco Cessation Rating Scale) learned addiction model, cues/triggers, personal reasons for quitting, medications, goals, quit date preparation.  Target symptoms:  withdrawal and medication side effects             The following were rated moderate (3) to severe (4) on TCRS:       Moderate 3: restlessness & urges   (patient prefers not to use NRT)     Severe 4:   none  Patient's Response to Intervention: Active participation, self-disclosure, supportive of group peers.  Progress Toward Goals and Other Mental Status Changes:  Still smoking one pack of cigarettes/day.  Denied side effects from Chantix.  Shared smoking history with the group, reasons for wanting to quit, and strong motivation to achieve this goal.  Diagnosis: F17.210  Plan: Group therapy, individual support/cessation counseling and medication monitoring by CTTS. Medication management by providers.  Return to Clinic: 1 week  Planned Quit Date:  11/15/17

## 2017-09-26 ENCOUNTER — CLINICAL SUPPORT (OUTPATIENT)
Dept: SMOKING CESSATION | Facility: CLINIC | Age: 52
End: 2017-09-26
Payer: COMMERCIAL

## 2017-09-26 DIAGNOSIS — F17.210 NICOTINE DEPENDENCE, CIGARETTES, UNCOMPLICATED: Primary | ICD-10-CM

## 2017-09-26 PROCEDURE — 90853 GROUP PSYCHOTHERAPY: CPT | Mod: S$GLB,,,

## 2017-09-27 NOTE — PROGRESS NOTES
Smoking Cessation Group Session #2    Site: [facility]  Date:  9/26/17  Clinical Status of Patient: Outpatient   Length of Service and Code: 90 minutes - 12520   Number in Attendance: 2  Group Activities/Focus of Group:  Sharing last weeks challenges, triggers, and coping activities to remain quit and/ or keep making progress toward cessation, completion of TCRS (Tobacco Cessation Rating Scale) learned addiction model, personal reasons for quitting, medications, goals, quit date.  Specific session focus: Dependence, withdrawal medications, effects of nicotine on the body, toxins in cigarettes, health risks of smoking, health improvements with quitting, states of mind and effect on urges.  Moving through urges without fighting them or smoking by mindful awareness.  Target symptoms:  withdrawal and medication side effects             The following were rated moderate (3) to severe (4) on TCRS:       Moderate 3: vivid dreams but denied disruption in sleep.     Severe 4:   none  Patient's Response to Intervention: Active participation, self-disclosure, supportive of group peers.  Progress Toward Goals and Other Mental Status Changes:   Still smoking 10 - 20 cigarettes/day, cut down by 5 to 10 cigarettes a day.  He shared with the group his desire to go slowly in his process of quitting and we discussed fears of failure and self-expectations.  Diagnosis:  F17.210  Plan: Group therapy, individual support/cessation counseling and medication monitoring by CTTS. Medication management by providers.  Return to Clinic: 1 week  Planned Quit Date:  11/15/17

## 2017-10-02 ENCOUNTER — TELEPHONE (OUTPATIENT)
Dept: INTERNAL MEDICINE | Facility: CLINIC | Age: 52
End: 2017-10-02

## 2017-10-02 DIAGNOSIS — F17.210 NICOTINE DEPENDENCE, CIGARETTES, UNCOMPLICATED: Primary | ICD-10-CM

## 2017-10-02 DIAGNOSIS — E78.5 HYPERLIPIDEMIA, UNSPECIFIED HYPERLIPIDEMIA TYPE: Primary | ICD-10-CM

## 2017-10-02 RX ORDER — ATORVASTATIN CALCIUM 20 MG/1
20 TABLET, FILM COATED ORAL DAILY
Qty: 90 TABLET | Refills: 1 | Status: SHIPPED | OUTPATIENT
Start: 2017-10-02 | End: 2017-12-04 | Stop reason: SDUPTHER

## 2017-10-02 NOTE — TELEPHONE ENCOUNTER
Called patient and discussed labs and or test results. Patient expressed understanding and had the opportunity to ask questions. Any questions were answered. See meds, orders, follow up and instructions sections of encounter.    Specific issues include:  Will increase lipitor to 20 and anna in 3 months

## 2017-10-03 ENCOUNTER — CLINICAL SUPPORT (OUTPATIENT)
Dept: SMOKING CESSATION | Facility: CLINIC | Age: 52
End: 2017-10-03
Payer: COMMERCIAL

## 2017-10-03 DIAGNOSIS — F17.210 NICOTINE DEPENDENCE, CIGARETTES, UNCOMPLICATED: Primary | ICD-10-CM

## 2017-10-03 PROCEDURE — 90853 GROUP PSYCHOTHERAPY: CPT | Mod: S$GLB,,,

## 2017-10-03 RX ORDER — VARENICLINE TARTRATE 1 MG/1
1 TABLET, FILM COATED ORAL 2 TIMES DAILY
Qty: 56 TABLET | Refills: 0 | Status: SHIPPED | OUTPATIENT
Start: 2017-10-03 | End: 2017-11-06 | Stop reason: SDUPTHER

## 2017-10-04 NOTE — PROGRESS NOTES
Smoking Cessation Group Session #3    Site: [facility]  Date:  10/4/17  Clinical Status of Patient: Outpatient   Length of Service and Code: 90 minutes - 96257   Number in Attendance: 3  Group Activities/Focus of Group:  Sharing last weeks challenges, triggers, and coping activities to remain quit and/ or keep making progress toward cessation, completion of TCRS (Tobacco Cessation Rating Scale) learned addiction model, personal reasons for quitting, medications, goals, quit date.  Specific session focus:  Breaking the tobacco chain, willpower, medication, urge surfing, body triggers vulnerability factors & action plans.   Target symptoms:  withdrawal and medication side effects             The following were rated moderate (3) to severe (4) on TCRS:       Moderate 3: none     Severe 4:   none  Patient's Response to Intervention: Active participation, self-disclosure, supportive of group peers.  Progress Toward Goals and Other Mental Status Changes:  Patient is still smoking 10 cigarettes/day, cut down by 10 a day.  He shared with the group his triggers and what helps him.  He said he has been much more mindful of his smoking and did follow through with allowing himself to be with and notice urges versus fighting them or avoiding them. He felt this was helpful.  Diagnosis: F17.210  Plan: Group therapy, individual support/cessation counseling and medication monitoring by CTTS. Medication management by providers.  Return to Clinic: 1 week  Planned Quit Date:  11/15/17

## 2017-10-05 RX ORDER — TAMSULOSIN HYDROCHLORIDE 0.4 MG/1
CAPSULE ORAL
Qty: 90 CAPSULE | Refills: 1 | Status: SHIPPED | OUTPATIENT
Start: 2017-10-05 | End: 2018-04-05 | Stop reason: SDUPTHER

## 2017-10-18 ENCOUNTER — CLINICAL SUPPORT (OUTPATIENT)
Dept: SMOKING CESSATION | Facility: CLINIC | Age: 52
End: 2017-10-18
Payer: COMMERCIAL

## 2017-10-18 DIAGNOSIS — F17.210 NICOTINE DEPENDENCE, CIGARETTES, UNCOMPLICATED: Primary | ICD-10-CM

## 2017-10-18 PROCEDURE — 99407 BEHAV CHNG SMOKING > 10 MIN: CPT | Mod: S$GLB,,,

## 2017-10-26 ENCOUNTER — CLINICAL SUPPORT (OUTPATIENT)
Dept: SMOKING CESSATION | Facility: CLINIC | Age: 52
End: 2017-10-26
Payer: COMMERCIAL

## 2017-10-26 DIAGNOSIS — F17.210 NICOTINE DEPENDENCE, CIGARETTES, UNCOMPLICATED: Primary | ICD-10-CM

## 2017-10-26 PROCEDURE — 99402 PREV MED CNSL INDIV APPRX 30: CPT | Mod: S$GLB,,,

## 2017-10-28 NOTE — PROGRESS NOTES
Individual Follow-Up Form    10/26/17    Quit Date: Has not completely quit yet.    Clinical Status of Patient: Outpatient    Length of Service: 30 minutes    Continuing Medication:   Chantix and nicotine gum    Target Symptoms: Withdrawal and medication side effects. The following were  rated moderate (3) to severe (4) on TCRS:  · Moderate (3): none  · Severe (4): none    Comments:   Patient has not completely quit yet but is very close.  He finds he quits for about 2 days and then ends up having a couple cigarettes.  He does however think he has made a lot of progress not only in the number of cigarettes smoked /day but in the changes he has made to his behavior to move toward completely quitting.  I positively reinforced him for this and for his determination to keep moving forward to accomplish his goal.  We talked about his most recent slips, triggers, and possible interventions in the future to keep from smoking.  It seems his pattern is that after a few days when he gets the urge to smoke he no longer engages in noticing the urge, stepping back and allowing some time before he impulsively acts on the behavior.  Most often this is precipitated by an intensively felt negative emotion in the moment and usually when he is at work.  He feels a lot of pressure when at work.  He verbalized that he continues to feel confident that he will completely quit and will keep trying.  He also shared that he just started chewing the nicotine gum.  He was encouraged to keep this on him and use it when he gets the urge to smoke.  We made a plan together for how he will work on stepping back from the experience of the intense emotion and impulsively going to get a cigarette.  He wanted to continue to attend the Tuesday evening Group which starts again on 11/7/17.    Diagnosis: F17.210    Next Visit:   11/7/17

## 2017-11-06 DIAGNOSIS — F17.210 NICOTINE DEPENDENCE, CIGARETTES, UNCOMPLICATED: ICD-10-CM

## 2017-11-07 ENCOUNTER — CLINICAL SUPPORT (OUTPATIENT)
Dept: SMOKING CESSATION | Facility: CLINIC | Age: 52
End: 2017-11-07
Payer: COMMERCIAL

## 2017-11-07 DIAGNOSIS — F17.210 NICOTINE DEPENDENCE, CIGARETTES, UNCOMPLICATED: Primary | ICD-10-CM

## 2017-11-07 PROCEDURE — 90853 GROUP PSYCHOTHERAPY: CPT | Mod: S$GLB,,,

## 2017-11-07 RX ORDER — VARENICLINE TARTRATE 1 MG/1
1 TABLET, FILM COATED ORAL 2 TIMES DAILY
Qty: 56 TABLET | Refills: 0 | Status: SHIPPED | OUTPATIENT
Start: 2017-11-07 | End: 2018-05-15

## 2017-11-08 NOTE — PROGRESS NOTES
Smoking Cessation Group Session #1    Site:  [facility]  Date:  11/7/17  Clinical Status of Patient: Outpatient   Length of Service and Code: 90 minutes - 02522   Number in Attendance: 10  Group Activities/Focus of Group:  orientation, client introductions, completion of TCRS (Tobacco Cessation Rating Scale) learned addiction model, cues/triggers, personal reasons for quitting, medications, goals, quit date preparation.  Target symptoms:  withdrawal and medication side effects             The following were rated moderate (3) to severe (4) on TCRS:       Moderate 3: vivid dreams and back pain     Severe 4:   none  Patient's Response to Intervention: Active participation, self-disclosure, supportive of group peers.  Progress Toward Goals and Other Mental Status Changes:  Shared smoking history with the group, reasons for quitting, and strong motivation to remain quit.  He shared with the group the strategies he used to quit.  He reported he decreased the dose on Chantix that he is taking to just one a day because of the dreams and back pain.  He does not want to have to discontinue it because it is working for him.  Diagnosis: F17.210  Plan: Group therapy, individual support/cessation counseling and medication monitoring by CTTS. Medication management by providers.  Return to Clinic: 1 week                           Quit Date:  10/15/17

## 2017-11-18 DIAGNOSIS — F17.210 NICOTINE DEPENDENCE, CIGARETTES, UNCOMPLICATED: ICD-10-CM

## 2017-11-21 ENCOUNTER — CLINICAL SUPPORT (OUTPATIENT)
Dept: SMOKING CESSATION | Facility: CLINIC | Age: 52
End: 2017-11-21
Payer: COMMERCIAL

## 2017-11-21 DIAGNOSIS — F17.210 NICOTINE DEPENDENCE, CIGARETTES, UNCOMPLICATED: Primary | ICD-10-CM

## 2017-11-21 PROCEDURE — 90853 GROUP PSYCHOTHERAPY: CPT | Mod: S$GLB,,,

## 2017-11-21 RX ORDER — VARENICLINE TARTRATE 1 MG/1
1 TABLET, FILM COATED ORAL 2 TIMES DAILY
Qty: 56 TABLET | Refills: 0 | Status: SHIPPED | OUTPATIENT
Start: 2017-11-21 | End: 2018-05-15

## 2017-11-22 NOTE — PROGRESS NOTES
Smoking Cessation Group Session #    3    Site: [facility]  Date:  11/21/17  Clinical Status of Patient: Outpatient   Length of Service and Code: 90 minutes - [id number]   Number in Attendance: 6  Group Activities/Focus of Group:  Sharing last weeks challenges, triggers, and coping activities to remain quit and/ or keep making progress toward cessation, completion of TCRS (Tobacco Cessation Rating Scale) learned addiction model, personal reasons for quitting, medications, goals, quit date.  Specific session focus: Breaking the tobacco chain, willpower, medication, body triggers vulnerability factors & action plans.  Target symptoms:  withdrawal and medication side effects             The following were rated moderate (3) to severe (4) on TCRS:       Moderate 3: none     Severe 4:   none  Patient's Response to Intervention: Active participation, self-disclosure, supportive of group peers.  Progress Toward Goals and Other Mental Status Changes:   Still doing well on the Chantix and shows a genuine appreciation for working on the behavioral preparations to stay quit as well.  Remains highly motivated.  Diagnosis: F17.210  Plan: Group therapy, individual support/cessation counseling and medication monitoring by CTTS. Medication management by providers.  Return to Clinic: 1 week  Quit Date: 10/15/17

## 2017-12-04 RX ORDER — ATORVASTATIN CALCIUM 20 MG/1
20 TABLET, FILM COATED ORAL DAILY
Qty: 90 TABLET | Refills: 1 | Status: SHIPPED | OUTPATIENT
Start: 2017-12-04 | End: 2018-04-05 | Stop reason: SDUPTHER

## 2017-12-04 RX ORDER — ATORVASTATIN CALCIUM 10 MG/1
10 TABLET, FILM COATED ORAL DAILY
Qty: 90 TABLET | Refills: 2 | OUTPATIENT
Start: 2017-12-04

## 2018-01-03 ENCOUNTER — CLINICAL SUPPORT (OUTPATIENT)
Dept: SMOKING CESSATION | Facility: CLINIC | Age: 53
End: 2018-01-03
Payer: COMMERCIAL

## 2018-01-03 DIAGNOSIS — F17.210 NICOTINE DEPENDENCE, CIGARETTES, UNCOMPLICATED: Primary | ICD-10-CM

## 2018-01-03 PROCEDURE — 99406 BEHAV CHNG SMOKING 3-10 MIN: CPT | Mod: S$GLB,,,

## 2018-02-06 ENCOUNTER — CLINICAL SUPPORT (OUTPATIENT)
Dept: SMOKING CESSATION | Facility: CLINIC | Age: 53
End: 2018-02-06
Payer: COMMERCIAL

## 2018-02-06 DIAGNOSIS — F17.210 NICOTINE DEPENDENCE, CIGARETTES, UNCOMPLICATED: Primary | ICD-10-CM

## 2018-02-06 PROCEDURE — 99407 BEHAV CHNG SMOKING > 10 MIN: CPT | Mod: S$GLB,,,

## 2018-02-12 RX ORDER — LISINOPRIL 10 MG/1
10 TABLET ORAL DAILY
Qty: 90 TABLET | Refills: 2 | Status: SHIPPED | OUTPATIENT
Start: 2018-02-12 | End: 2018-04-05 | Stop reason: SDUPTHER

## 2018-03-09 ENCOUNTER — TELEPHONE (OUTPATIENT)
Dept: INTERNAL MEDICINE | Facility: CLINIC | Age: 53
End: 2018-03-09

## 2018-03-09 DIAGNOSIS — Z12.5 PROSTATE CANCER SCREENING: ICD-10-CM

## 2018-03-09 DIAGNOSIS — I10 HYPERTENSION, ESSENTIAL: ICD-10-CM

## 2018-03-09 DIAGNOSIS — Z00.00 ANNUAL PHYSICAL EXAM: Primary | ICD-10-CM

## 2018-03-09 DIAGNOSIS — E78.5 HYPERLIPIDEMIA, UNSPECIFIED HYPERLIPIDEMIA TYPE: ICD-10-CM

## 2018-03-09 NOTE — TELEPHONE ENCOUNTER
----- Message from Lupe Quintanilla sent at 3/9/2018  2:31 PM CST -----  Lab Orders Needed    I have scheduled the above patients annual physical. Lab orders need to be placed and scheduled.    Date of Annual Physical: 04/05/2018    Thank You

## 2018-03-12 ENCOUNTER — OFFICE VISIT (OUTPATIENT)
Dept: UROLOGY | Facility: CLINIC | Age: 53
End: 2018-03-12
Payer: COMMERCIAL

## 2018-03-12 ENCOUNTER — TELEPHONE (OUTPATIENT)
Dept: SMOKING CESSATION | Facility: CLINIC | Age: 53
End: 2018-03-12

## 2018-03-12 ENCOUNTER — LAB VISIT (OUTPATIENT)
Dept: LAB | Facility: HOSPITAL | Age: 53
End: 2018-03-12
Attending: UROLOGY
Payer: COMMERCIAL

## 2018-03-12 VITALS — SYSTOLIC BLOOD PRESSURE: 126 MMHG | HEIGHT: 64 IN | DIASTOLIC BLOOD PRESSURE: 86 MMHG

## 2018-03-12 DIAGNOSIS — N40.1 BPH WITH URINARY OBSTRUCTION: ICD-10-CM

## 2018-03-12 DIAGNOSIS — Z12.5 PROSTATE CANCER SCREENING: ICD-10-CM

## 2018-03-12 DIAGNOSIS — R79.9 NONSPECIFIC FINDINGS ON EXAMINATION OF BLOOD: Primary | ICD-10-CM

## 2018-03-12 DIAGNOSIS — N13.8 BPH WITH URINARY OBSTRUCTION: ICD-10-CM

## 2018-03-12 LAB
BILIRUB SERPL-MCNC: ABNORMAL MG/DL
BLOOD URINE, POC: ABNORMAL
COLOR, POC UA: YELLOW
COMPLEXED PSA SERPL-MCNC: 1.7 NG/ML
GLUCOSE UR QL STRIP: ABNORMAL
KETONES UR QL STRIP: ABNORMAL
LEUKOCYTE ESTERASE URINE, POC: ABNORMAL
NITRITE, POC UA: ABNORMAL
PH, POC UA: 6
PROTEIN, POC: ABNORMAL
SPECIFIC GRAVITY, POC UA: 1.01
UROBILINOGEN, POC UA: 1

## 2018-03-12 PROCEDURE — 36415 COLL VENOUS BLD VENIPUNCTURE: CPT

## 2018-03-12 PROCEDURE — 99999 PR PBB SHADOW E&M-EST. PATIENT-LVL II: CPT | Mod: PBBFAC,,, | Performed by: UROLOGY

## 2018-03-12 PROCEDURE — 3074F SYST BP LT 130 MM HG: CPT | Mod: CPTII,S$GLB,, | Performed by: UROLOGY

## 2018-03-12 PROCEDURE — 84153 ASSAY OF PSA TOTAL: CPT

## 2018-03-12 PROCEDURE — 99214 OFFICE O/P EST MOD 30 MIN: CPT | Mod: 25,S$GLB,, | Performed by: UROLOGY

## 2018-03-12 PROCEDURE — 81002 URINALYSIS NONAUTO W/O SCOPE: CPT | Mod: S$GLB,,, | Performed by: UROLOGY

## 2018-03-12 PROCEDURE — 3079F DIAST BP 80-89 MM HG: CPT | Mod: CPTII,S$GLB,, | Performed by: UROLOGY

## 2018-03-12 NOTE — TELEPHONE ENCOUNTER
3/12/18   6:00 pm    Telephone call to patient to follow up on continued smoking cessation.  Left a message with female who answered the phone for him to call me back.

## 2018-03-12 NOTE — PROGRESS NOTES
"This patient was last seen by me in August of last year in follow-up for slight rise though still normal PSA which on repeat came back to his baseline    Patient now comes for follow-up and has not yet had his repeat PSA drawn.  Urine culture from his last visit was negative    Patient continues on Flomax and is satisfied with his voiding pattern.  He has in-between strength of stream with nocturia ×0-1 and no straining to void and no dysuria    Physical exam reveals reveals a well-developed well-nourished patient  in no acute distress.  Patient is alert and oriented ×3 with normal mood and affect.  Respiratory effort is normal and there is no peripheral edema.  Skin is normal to inspection and palpation. Penis/perineum without lesions, scrotum without rash/cysts, epididymis nontender bilaterally, urethral meatus in normal location normal size, no penile plaques palpated, prostate:     Smooth, Enlarged and benign feeling                  seminal vesicles not palpated.  No rectal masses, sphincter tone normal.  Testes equal in size without masses    /86 (BP Location: Left arm, BP Method: Large (Automatic))   Ht 5' 4" (1.626 m)   Review of Systems  General ROS: negative for chills, fever or weight loss  Respiratory ROS: no cough, shortness of breath, or wheezing  Cardiovascular ROS: no chest pain or dyspnea on exertion  Musculoskeletal ROS: negative for gait disturbance or muscular weakness    Family History   Problem Relation Age of Onset    Stroke Father     Heart disease Paternal Uncle     Kidney disease Paternal Uncle     Depression Paternal Uncle     Cancer Maternal Grandfather     COPD Maternal Grandfather     Cancer Paternal Grandmother     COPD Paternal Aunt      Past Medical History:   Diagnosis Date    BPH (benign prostatic hypertrophy)     Hernia     Hyperlipidemia     Hypertension, essential 12/30/2015    Umbilical hernia      Family History   Problem Relation Age of Onset    Stroke " Father     Heart disease Paternal Uncle     Kidney disease Paternal Uncle     Depression Paternal Uncle     Cancer Maternal Grandfather     COPD Maternal Grandfather     Cancer Paternal Grandmother     COPD Paternal Aunt      Social History   Substance Use Topics    Smoking status: Former Smoker     Quit date: 10/15/2017    Smokeless tobacco: Not on file    Alcohol use 0.0 oz/week      Comment: socially       Impression:      ICD-10-CM ICD-9-CM    1. Nonspecific findings on examination of blood R79.9 790.99 POCT URINE DIPSTICK WITHOUT MICROSCOPE   2. Prostate cancer screening Z12.5 V76.44 PSA, Screening   3. BPH with urinary obstruction N40.1 600.01     N13.8 599.69        Plan:    #1.  And 2.  Slight rise though still normal PSA.  Plan.  And #2 prostate cancer screening.  We will check PSA today and assuming that if this is in his usual range, no further follow-up will be needed with me.  I would recommend yearly checks of his PSA through his PCP.  Patient voices understanding and agrees with this plan    #3.  BPH.  Plan.  Patient satisfied with his voiding pattern on Flomax which I discussed with him is an indefinite medication.  This is renewed through his PCP    PLEASE NOTE:  Please be advised that portions of this note were dictated using voice recognition software and may contain dictation related errors in spelling/grammar/appropriate pronouns/syntax or other errors that might have not been found and or corrected on text review.

## 2018-03-13 ENCOUNTER — TELEPHONE (OUTPATIENT)
Dept: UROLOGY | Facility: CLINIC | Age: 53
End: 2018-03-13

## 2018-03-13 NOTE — TELEPHONE ENCOUNTER
Patient has an existing appointment - See lab orders and please please call patient to schedule labs before appointment. Thank you

## 2018-03-13 NOTE — TELEPHONE ENCOUNTER
----- Message from Yoshi Gutierrez MD sent at 3/13/2018  1:24 PM CDT -----  Please contact the patient and let him know that PSA comes back normal and is the same value as approximately 5 years ago

## 2018-03-21 ENCOUNTER — TELEPHONE (OUTPATIENT)
Dept: SMOKING CESSATION | Facility: CLINIC | Age: 53
End: 2018-03-21

## 2018-03-21 NOTE — TELEPHONE ENCOUNTER
3/21/18   9:25 a.m.    Telephone call to patient to follow up on progress quitting smoking.  Left voice mail #2 for return call.

## 2018-04-04 RX ORDER — ATORVASTATIN CALCIUM 20 MG/1
20 TABLET, FILM COATED ORAL DAILY
Qty: 90 TABLET | Refills: 1 | Status: CANCELLED | OUTPATIENT
Start: 2018-04-04

## 2018-04-04 RX ORDER — TAMSULOSIN HYDROCHLORIDE 0.4 MG/1
1 CAPSULE ORAL DAILY
Qty: 90 CAPSULE | Refills: 1 | Status: CANCELLED | OUTPATIENT
Start: 2018-04-04

## 2018-04-05 ENCOUNTER — OFFICE VISIT (OUTPATIENT)
Dept: INTERNAL MEDICINE | Facility: CLINIC | Age: 53
End: 2018-04-05
Attending: FAMILY MEDICINE
Payer: COMMERCIAL

## 2018-04-05 ENCOUNTER — TELEPHONE (OUTPATIENT)
Dept: SMOKING CESSATION | Facility: CLINIC | Age: 53
End: 2018-04-05

## 2018-04-05 VITALS
DIASTOLIC BLOOD PRESSURE: 72 MMHG | TEMPERATURE: 98 F | HEART RATE: 96 BPM | HEIGHT: 64 IN | BODY MASS INDEX: 35 KG/M2 | WEIGHT: 205 LBS | SYSTOLIC BLOOD PRESSURE: 110 MMHG | OXYGEN SATURATION: 98 %

## 2018-04-05 DIAGNOSIS — F17.200 SMOKER: ICD-10-CM

## 2018-04-05 DIAGNOSIS — Z00.00 ANNUAL PHYSICAL EXAM: Primary | ICD-10-CM

## 2018-04-05 DIAGNOSIS — M54.5 LOW BACK PAIN, UNSPECIFIED BACK PAIN LATERALITY, UNSPECIFIED CHRONICITY, WITH SCIATICA PRESENCE UNSPECIFIED: ICD-10-CM

## 2018-04-05 DIAGNOSIS — I10 HYPERTENSION, ESSENTIAL: ICD-10-CM

## 2018-04-05 DIAGNOSIS — Z12.5 PROSTATE CANCER SCREENING: ICD-10-CM

## 2018-04-05 DIAGNOSIS — E78.5 HYPERLIPIDEMIA, UNSPECIFIED HYPERLIPIDEMIA TYPE: ICD-10-CM

## 2018-04-05 DIAGNOSIS — K63.5 POLYP OF COLON, UNSPECIFIED PART OF COLON, UNSPECIFIED TYPE: ICD-10-CM

## 2018-04-05 DIAGNOSIS — Z12.9 SCREENING FOR CANCER: ICD-10-CM

## 2018-04-05 DIAGNOSIS — Z12.11 COLON CANCER SCREENING: ICD-10-CM

## 2018-04-05 DIAGNOSIS — L98.9 SKIN LESION: ICD-10-CM

## 2018-04-05 PROCEDURE — 99396 PREV VISIT EST AGE 40-64: CPT | Mod: S$GLB,,, | Performed by: FAMILY MEDICINE

## 2018-04-05 PROCEDURE — 99999 PR PBB SHADOW E&M-EST. PATIENT-LVL V: CPT | Mod: PBBFAC,,, | Performed by: FAMILY MEDICINE

## 2018-04-05 RX ORDER — ATORVASTATIN CALCIUM 20 MG/1
20 TABLET, FILM COATED ORAL DAILY
Qty: 90 TABLET | Refills: 1 | Status: SHIPPED | OUTPATIENT
Start: 2018-04-05 | End: 2018-11-05 | Stop reason: SDUPTHER

## 2018-04-05 RX ORDER — LISINOPRIL 10 MG/1
10 TABLET ORAL DAILY
Qty: 90 TABLET | Refills: 2 | Status: SHIPPED | OUTPATIENT
Start: 2018-04-05 | End: 2019-02-12 | Stop reason: SDUPTHER

## 2018-04-05 RX ORDER — TAMSULOSIN HYDROCHLORIDE 0.4 MG/1
1 CAPSULE ORAL DAILY
Qty: 90 CAPSULE | Refills: 1 | Status: SHIPPED | OUTPATIENT
Start: 2018-04-05 | End: 2018-04-09 | Stop reason: SDUPTHER

## 2018-04-05 NOTE — TELEPHONE ENCOUNTER
4/5/18   12:55 pm    Telephone call to patient to follow up on progress maintaining smoking cessation.  Unable to leave VM # 3 because a recording said the voice mail had not been set up yet.

## 2018-04-05 NOTE — PROGRESS NOTES
Subjective:       Patient ID: Nanda Colin III is a 52 y.o. male.    Chief Complaint: Annual Exam    Established patient for an annual wellness check/physical exam and also chronic disease management. Specific complaints - see dictation and please see ROS.        Review of Systems   Constitutional: Negative for chills, fatigue and fever.   HENT: Negative for congestion and trouble swallowing.    Eyes: Negative for redness.   Respiratory: Negative for cough, chest tightness and shortness of breath.    Cardiovascular: Negative for chest pain, palpitations and leg swelling.   Gastrointestinal: Negative for abdominal pain and blood in stool.   Genitourinary: Negative for hematuria.   Musculoskeletal: Positive for back pain. Negative for arthralgias, gait problem, joint swelling, myalgias and neck pain.   Skin: Positive for color change and rash.   Neurological: Positive for numbness. Negative for tremors, speech difficulty, weakness and headaches.   Hematological: Negative for adenopathy. Does not bruise/bleed easily.   Psychiatric/Behavioral: Negative for behavioral problems, confusion and sleep disturbance. The patient is not nervous/anxious.        Objective:      Physical Exam   Constitutional: He is oriented to person, place, and time. He appears well-developed and well-nourished. No distress.   HENT:   Head: Normocephalic.   Right Ear: Tympanic membrane, external ear and ear canal normal.   Left Ear: Tympanic membrane, external ear and ear canal normal.   Nose: Nose normal.   Mouth/Throat: Uvula is midline, oropharynx is clear and moist and mucous membranes are normal. No oropharyngeal exudate.   Eyes: Conjunctivae are normal. Right eye exhibits no discharge. Left eye exhibits no discharge. No scleral icterus.   Neck: Normal range of motion. Neck supple. No thyromegaly present.   Cardiovascular: Normal rate, regular rhythm, normal heart sounds and intact distal pulses.  Exam reveals no gallop and no friction  rub.    No murmur heard.  Pulmonary/Chest: Effort normal. No respiratory distress. He has no wheezes. He has no rales. He exhibits no tenderness.   Abdominal: Soft. There is no tenderness.   Musculoskeletal: He exhibits no edema.   Lymphadenopathy:     He has no cervical adenopathy.   Neurological: He is alert and oriented to person, place, and time.   Skin: Skin is warm and dry. No rash noted. He is not diaphoretic.   Nursing note and vitals reviewed.      Assessment:       1. Annual physical exam    2. Hypertension, essential    3. Hyperlipidemia, unspecified hyperlipidemia type    4. Prostate cancer screening    5. Polyp of colon, unspecified part of colon, unspecified type    6. Smoker    7. Low back pain, unspecified back pain laterality, unspecified chronicity, with sciatica presence unspecified    8. Skin lesion    9. Screening for cancer    10. Colon cancer screening        Plan:   Nanda was seen today for annual exam.    Diagnoses and all orders for this visit:    Annual physical exam    Hypertension, essential    Hyperlipidemia, unspecified hyperlipidemia type    Prostate cancer screening    Polyp of colon, unspecified part of colon, unspecified type  -     Case request GI: COLONOSCOPY  -     Case request GI: COLONOSCOPY    Smoker  -     Ambulatory referral to Smoking Cessation Program    Low back pain, unspecified back pain laterality, unspecified chronicity, with sciatica presence unspecified  -     X-Ray Lumbar Spine Ap And Lateral; Future  -     Ambulatory Consult to [facility]    Skin lesion  -     Ambulatory referral to Dermatology    Screening for cancer  -     CT Chest Lung Screening Low Dose; Future  -     Case request GI: COLONOSCOPY    Colon cancer screening  -     Case request GI: COLONOSCOPY    Other orders  -     atorvastatin (LIPITOR) 20 MG tablet; Take 1 tablet (20 mg total) by mouth once daily.  -     lisinopril 10 MG tablet; Take 1 tablet (10 mg total) by mouth once daily.  -      tamsulosin (FLOMAX) 0.4 mg Cp24; Take 1 capsule (0.4 mg total) by mouth once daily.    His finger numbness is improving - unclear etiology - will observe for any increase sx and call for persistence or worsening.     See meds, orders, follow up, routing and instructions sections of encounter.    A 52-year-old established male patient in for his annual physical examination   with the following complaints:  Right index finger distal numbness ulnar aspect,   no injury, one month, slight improvement, worse with extension of the hand or   arm.  No neck pain.  No other digits involved.  No motor loss.    Skin lesion, left hemiface, would like to see a dermatologist.    Chart reviewed, due for lipids and CMP.    He is followed for PSA with the [facility]rology [facility] that had normalized at his last   visit.      ANTONIO/JASS  dd: 04/05/2018 17:07:47 (CDT)  td: 04/06/2018 10:52:55 (CDT)  Doc ID   #[id number]  Job ID #[id number]    CC:

## 2018-04-09 ENCOUNTER — LAB VISIT (OUTPATIENT)
Dept: LAB | Facility: HOSPITAL | Age: 53
End: 2018-04-09
Attending: FAMILY MEDICINE
Payer: COMMERCIAL

## 2018-04-09 DIAGNOSIS — E78.5 HYPERLIPIDEMIA, UNSPECIFIED HYPERLIPIDEMIA TYPE: ICD-10-CM

## 2018-04-09 DIAGNOSIS — Z00.00 ANNUAL PHYSICAL EXAM: ICD-10-CM

## 2018-04-09 DIAGNOSIS — I10 HYPERTENSION, ESSENTIAL: ICD-10-CM

## 2018-04-09 LAB
ALBUMIN SERPL BCP-MCNC: 4 G/DL
ALP SERPL-CCNC: 76 U/L
ALT SERPL W/O P-5'-P-CCNC: 44 U/L
ANION GAP SERPL CALC-SCNC: 11 MMOL/L
AST SERPL-CCNC: 24 U/L
BILIRUB SERPL-MCNC: 0.9 MG/DL
BUN SERPL-MCNC: 16 MG/DL
CALCIUM SERPL-MCNC: 9.7 MG/DL
CHLORIDE SERPL-SCNC: 105 MMOL/L
CHOLEST SERPL-MCNC: 181 MG/DL
CHOLEST/HDLC SERPL: 6 {RATIO}
CO2 SERPL-SCNC: 21 MMOL/L
CREAT SERPL-MCNC: 0.9 MG/DL
EST. GFR  (AFRICAN AMERICAN): >60 ML/MIN/1.73 M^2
EST. GFR  (NON AFRICAN AMERICAN): >60 ML/MIN/1.73 M^2
GLUCOSE SERPL-MCNC: 105 MG/DL
HDLC SERPL-MCNC: 30 MG/DL
HDLC SERPL: 16.6 %
LDLC SERPL CALC-MCNC: 96.2 MG/DL
NONHDLC SERPL-MCNC: 151 MG/DL
POTASSIUM SERPL-SCNC: 4.2 MMOL/L
PROT SERPL-MCNC: 7.2 G/DL
SODIUM SERPL-SCNC: 137 MMOL/L
TRIGL SERPL-MCNC: 274 MG/DL

## 2018-04-09 PROCEDURE — 80061 LIPID PANEL: CPT

## 2018-04-09 PROCEDURE — 36415 COLL VENOUS BLD VENIPUNCTURE: CPT

## 2018-04-09 PROCEDURE — 80053 COMPREHEN METABOLIC PANEL: CPT

## 2018-04-09 RX ORDER — TAMSULOSIN HYDROCHLORIDE 0.4 MG/1
1 CAPSULE ORAL DAILY
Qty: 90 CAPSULE | Refills: 1 | Status: SHIPPED | OUTPATIENT
Start: 2018-04-09 | End: 2018-04-25 | Stop reason: SDUPTHER

## 2018-04-09 NOTE — TELEPHONE ENCOUNTER
----- Message from Allan Treadwell sent at 4/9/2018 11:02 AM CDT -----  Contact: [facility]  [phone]  Pharmacy is calling to clarify an RX.  RX name:  tamsulosin (FLOMAX) 0.4 mg Cp24  What do they need to clarify:  Needs 90 supply per insurance request   Comments:

## 2018-04-16 ENCOUNTER — HOSPITAL ENCOUNTER (OUTPATIENT)
Dept: RADIOLOGY | Facility: HOSPITAL | Age: 53
Discharge: HOME | End: 2018-04-16
Attending: FAMILY MEDICINE
Payer: COMMERCIAL

## 2018-04-16 DIAGNOSIS — M54.5 LOW BACK PAIN, UNSPECIFIED BACK PAIN LATERALITY, UNSPECIFIED CHRONICITY, WITH SCIATICA PRESENCE UNSPECIFIED: ICD-10-CM

## 2018-04-16 PROCEDURE — 72100 X-RAY EXAM L-S SPINE 2/3 VWS: CPT | Mod: TC

## 2018-04-16 PROCEDURE — 72100 X-RAY EXAM L-S SPINE 2/3 VWS: CPT | Mod: 26,,, | Performed by: RADIOLOGY

## 2018-04-25 ENCOUNTER — HOSPITAL ENCOUNTER (OUTPATIENT)
Dept: RADIOLOGY | Facility: HOSPITAL | Age: 53
Discharge: HOME | End: 2018-04-25
Attending: FAMILY MEDICINE

## 2018-04-25 DIAGNOSIS — Z12.9 SCREENING FOR CANCER: ICD-10-CM

## 2018-04-25 PROCEDURE — 76497 UNLISTED CT PROCEDURE: CPT | Mod: TC

## 2018-04-25 RX ORDER — TAMSULOSIN HYDROCHLORIDE 0.4 MG/1
CAPSULE ORAL
Qty: 90 CAPSULE | Refills: 1 | Status: SHIPPED | OUTPATIENT
Start: 2018-04-25 | End: 2018-11-06 | Stop reason: SDUPTHER

## 2018-05-15 ENCOUNTER — OFFICE VISIT (OUTPATIENT)
Dept: SPINE | Facility: CLINIC | Age: 53
End: 2018-05-15
Attending: PHYSICAL MEDICINE & REHABILITATION
Payer: COMMERCIAL

## 2018-05-15 VITALS
BODY MASS INDEX: 34.31 KG/M2 | HEIGHT: 64 IN | DIASTOLIC BLOOD PRESSURE: 76 MMHG | HEART RATE: 102 BPM | WEIGHT: 201 LBS | SYSTOLIC BLOOD PRESSURE: 121 MMHG

## 2018-05-15 DIAGNOSIS — M47.816 SPONDYLOSIS OF LUMBAR REGION WITHOUT MYELOPATHY OR RADICULOPATHY: ICD-10-CM

## 2018-05-15 DIAGNOSIS — M54.50 CHRONIC RIGHT-SIDED LOW BACK PAIN WITHOUT SCIATICA: Primary | ICD-10-CM

## 2018-05-15 DIAGNOSIS — G89.29 CHRONIC RIGHT-SIDED LOW BACK PAIN WITHOUT SCIATICA: Primary | ICD-10-CM

## 2018-05-15 PROCEDURE — 99999 PR PBB SHADOW E&M-EST. PATIENT-LVL III: CPT | Mod: PBBFAC,,, | Performed by: PHYSICAL MEDICINE & REHABILITATION

## 2018-05-15 PROCEDURE — 3074F SYST BP LT 130 MM HG: CPT | Mod: CPTII,S$GLB,, | Performed by: PHYSICAL MEDICINE & REHABILITATION

## 2018-05-15 PROCEDURE — 3078F DIAST BP <80 MM HG: CPT | Mod: CPTII,S$GLB,, | Performed by: PHYSICAL MEDICINE & REHABILITATION

## 2018-05-15 PROCEDURE — 3008F BODY MASS INDEX DOCD: CPT | Mod: CPTII,S$GLB,, | Performed by: PHYSICAL MEDICINE & REHABILITATION

## 2018-05-15 PROCEDURE — 99204 OFFICE O/P NEW MOD 45 MIN: CPT | Mod: S$GLB,,, | Performed by: PHYSICAL MEDICINE & REHABILITATION

## 2018-05-15 RX ORDER — MELOXICAM 15 MG/1
15 TABLET ORAL DAILY
Qty: 30 TABLET | Refills: 2 | Status: SHIPPED | OUTPATIENT
Start: 2018-05-15 | End: 2019-02-21 | Stop reason: ALTCHOICE

## 2018-05-15 NOTE — LETTER
May 15, 2018      Horace Weber MD  [address]  [facility] [address]           [facility]  [address], [address]  [facility] [address]  Phone: [phone]  Fax: [phone]          Patient: Nanda oClin III   MR Number: [id number]   YOB: 1965   Date of Visit: 5/15/2018       Dear Dr. Horace Weber:    Thank you for referring Nanda Colin to me for evaluation. Attached you will find relevant portions of my assessment and plan of care.    If you have questions, please do not hesitate to call me. I look forward to following Nanda Colin along with you.    Sincerely,    Kaylyn Bower MD    Enclosure  CC:  No Recipients    If you would like to receive this communication electronically, please contact [email or web address] or [phone] to request more information on [employer] Link access.    For providers and/or their staff who would like to refer a patient to [facility], please contact us through our one-stop-shop provider referral line, [facility], at [phone].    If you feel you have received this communication in error or would no longer like to receive these types of communications, please e-mail [email or web address]

## 2018-05-15 NOTE — PROGRESS NOTES
Subjective:      Patient ID: Nanda Colin III is a 52 y.o. male.    Chief Complaint: Low-back Pain    Mr colin is a 53 yo male sent by Dr. Weber for evaluation of low back pain.  He has had low back pain for the past 20 years, but the pain has gotten worse over the years, and really worsened 1.5 years ago.  He has the pain with walking in back and the top of the right hip.  The pain is worst in the buttock.  The pain in the middle of the back is worse with standing 2.5 hours.  The pain on the right buttock is with walking.  He does not have to walk far to have this pain start.  The pain is not constant.  The pain is better lying down.  He does not feel like aleve helps.  He does feel like tylenol helps.  The pain is an achy pain.  There is no leg pain.  He pulled the back a couple of weeks ago.  There is increased pain with bending.  Sitting can make it feel better.  There is no numbness and no tingling.  He tried orthotics but no relief.  He has tried chiropractor years ago with some relief.  He has not done PT for his back.  He will take aleve 2 pills twice a day, he will also take ibuprofen or tylenol.  The pain is 0/10 lying down, now 4/10, worst 10/10 with standing and walking and at the end of the day.      X-ray lumbar 4/2018  FINDINGS:  The lumbar vertebra are intact.  No compression fracture is seen.  There is minor malformation of the posterior elements of L5 with a small cleft at the midline.  Small osteophytes are present at many levels.  T11-12 disc space is narrowed.  Lumbar disc spaces show no significant narrowing.  No obvious paraspinal lesion is noted.  Some atherosclerosis is seen in the aorta.  Impression       No acute abnormality.  Degenerative spine changes.      Past Medical History:  No date: BPH (benign prostatic hypertrophy)  No date: Hernia  No date: Hyperlipidemia  12/30/2015: Hypertension, essential  No date: Umbilical hernia    Past Surgical History:  No date: VASECTOMY  No  date: WISDOM TOOTH EXTRACTION    Review of patient's family history indicates:  Problem: Stroke      Relation: Father       Age of Onset: (Not Specified)   Problem: Heart disease      Relation: Paternal Uncle       Age of Onset: (Not Specified)   Problem: Kidney disease      Relation: Paternal Uncle       Age of Onset: (Not Specified)   Problem: Depression      Relation: Paternal Uncle       Age of Onset: (Not Specified)   Problem: Cancer      Relation: Maternal Grandfather       Age of Onset: (Not Specified)   Problem: COPD      Relation: Maternal Grandfather       Age of Onset: (Not Specified)   Problem: Cancer      Relation: Paternal Grandmother       Age of Onset: (Not Specified)   Problem: COPD      Relation: Paternal Aunt       Age of Onset: (Not Specified)       Social History    Marital status:              Spouse name: Lakia                  Years of education:                 Number of children: 2             Occupational History  Occupation          Employer            Comment               superintendent                           [employer]       Social History Main Topics    Smoking status: Former Smoker                                                                Packs/day: 0.00      Years: 0.00           Quit date: 10/15/2017    Alcohol use: Yes           0.0 oz/week       Comment: socially    Drug use: No              Sexual activity: Yes               Partners with: Female    Social History Narrative    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        Current Outpatient Prescriptions:  atorvastatin (LIPITOR) 20 MG tablet, Take 1 tablet (20 mg total) by mouth once daily., Disp: 90 tablet, Rfl: 1  CHANTIX 1 mg Tab, TAKE 1 TABLET (1 MG TOTAL) BY MOUTH 2 (TWO) TIMES DAILY., Disp: 56 tablet, Rfl: 0  lisinopril 10 MG tablet, Take 1 tablet (10 mg total) by mouth once daily., Disp: 90 tablet, Rfl: 2  nicotine polacrilex (NICORETTE) 4 MG Gum, Take 1 each (4 mg total)  by mouth as needed (Maximum 15 pieces/day.). (Generic preferred. Member of [facility] [employer]oking [employer]ess[employer]), Disp: 220 each, Rfl: 0  tamsulosin (FLOMAX) 0.4 mg Cp24, TAKE ONE CAPSULE BY MOUTH EVERY DAY, Disp: 90 capsule, Rfl: 1  varenicline (CHANTIX) 1 mg Tab, Take 1 tablet (1 mg total) by mouth 2 (two) times daily., Disp: 56 tablet, Rfl: 0    No current facility-administered medications for this visit.       Review of patient's allergies indicates:  No Known Allergies          Review of Systems   Constitution: Negative for weight gain and weight loss.   Cardiovascular: Negative for chest pain.   Respiratory: Negative for shortness of breath.    Musculoskeletal: Positive for back pain. Negative for joint pain and joint swelling.   Gastrointestinal: Negative for abdominal pain and bowel incontinence.   Genitourinary: Negative for bladder incontinence.   Neurological: Negative for numbness.         Objective:        General: Nanda is well-developed, well-nourished, appears stated age, in no acute distress, alert and oriented to time, place and person.     General    Vitals reviewed.  Constitutional: He is oriented to person, place, and time. He appears well-developed and well-nourished.   HENT:   Head: Normocephalic and atraumatic.   Pulmonary/Chest: Effort normal.   Neurological: He is alert and oriented to person, place, and time.   Psychiatric: He has a normal mood and affect. His behavior is normal. Judgment and thought content normal.     General Musculoskeletal Exam   Gait: normal     Right Ankle/Foot Exam     Tests   Heel Walk: able to perform  Tiptoe Walk: able to perform    Left Ankle/Foot Exam     Tests   Heel Walk: able to perform  Tiptoe Walk: able to perform  Back (L-Spine & T-Spine) / Neck (C-Spine) Exam     Tenderness Right paramedian tenderness of the Sacrum and Lower L-Spine.     Back (L-Spine & T-Spine) Range of Motion   Extension: 40 (with pain)   Flexion: 90   Lateral Bend Right: 30   Lateral Bend  Left: 30   Rotation Right: 40   Rotation Left: 40     Spinal Sensation   Right Side Sensation  C-Spine Level: normal   L-Spine Level: normal  S-Spine Level: normal  Left Side Sensation  C-Spine Level: normal  L-Spine Level: normal  S-Spine Level: normal    Back (L-Spine & T-Spine) Tests   Right Side Tests  Straight leg raise:      Sitting SLR: > 70 degrees      Left Side Tests  Straight leg raise:     Sitting SLR: > 70 degrees          Other He has no scoliosis .  Spinal Kyphosis:  Absent      Muscle Strength   Right Upper Extremity   Biceps: 5/5/5   Deltoid:  5/5  Triceps:  5/5  Wrist Extension: 5/5/5   Finger Flexors:  5/5  Left Upper Extremity  Biceps: 5/5/5   Deltoid:  5/5  Triceps:  5/5  Wrist Extension: 5/5/5   Finger Flexors:  5/5  Right Lower Extremity   Hip Flexion: 5/5   Quadriceps:  5/5   Anterior tibial:  5/5/5  EHL:  5/5  Left Lower Extremity   Hip Flexion: 5/5   Quadriceps:  5/5   Anterior tibial:  5/5/5   EHL:  5/5    Reflexes     Left Side  Biceps:  2+  Triceps:  2+  Brachioradialis:  2+  Quadriceps:  2+  Achilles:  2+  Left Robertson's Sign:  Absent  Babinski Sign:  absent    Right Side   Biceps:  2+  Triceps:  2+  Brachioradialis:  2+  Quadriceps:  2+  Achilles:  2+  Right Robertson's Sign:  absent  Babinski Sign:  absent    Vascular Exam     Right Pulses        Carotid:                  2+    Left Pulses        Carotid:                  2+              Assessment:       1. Chronic right-sided low back pain without sciatica    2. Spondylosis of lumbar region without myelopathy or radiculopathy           Plan:       Orders Placed This Encounter    Ambulatory Referral to Physical/Occupational Therapy    meloxicam (MOBIC) 15 MG tablet     More than 50% of the total time of 45 minutes was spent in counseling on diagnosis and treatment options. We discussed back pain and the nature of back pain.  We discussed that it is not one thing that causes the pain but an accumulation of multiple things that we do.   We discussed posture sitting and the importance of trying to sit better.  We discussed the benefits of therapy and exercise and continuing to move.  We discussed the importance of exercise for heart and muscles, and not just activity at work.  We discussed that he has not had leg pain so not a pinched nerve.    1.  PT for back strengthening, flexion exercises and core strengthening and HEP magnolia  2.  mobic 15mg po Qday  3.  We discussed possibility of muscle relaxer, but he is not interested  4.  He can take tylenol as needed  5.  RTC 10 weeks      Follow-up: Follow-up in about 10 weeks (around 7/24/2018). If there are any questions prior to this, the patient was instructed to contact the office.

## 2018-07-01 ENCOUNTER — OFFICE VISIT (OUTPATIENT)
Dept: URGENT CARE | Facility: CLINIC | Age: 53
End: 2018-07-01
Payer: COMMERCIAL

## 2018-07-01 ENCOUNTER — NURSE TRIAGE (OUTPATIENT)
Dept: ADMINISTRATIVE | Facility: CLINIC | Age: 53
End: 2018-07-01

## 2018-07-01 VITALS
RESPIRATION RATE: 18 BRPM | SYSTOLIC BLOOD PRESSURE: 118 MMHG | HEART RATE: 87 BPM | TEMPERATURE: 98 F | HEIGHT: 64 IN | BODY MASS INDEX: 34.31 KG/M2 | WEIGHT: 201 LBS | DIASTOLIC BLOOD PRESSURE: 83 MMHG | OXYGEN SATURATION: 98 %

## 2018-07-01 DIAGNOSIS — J20.9 ACUTE BRONCHITIS, UNSPECIFIED ORGANISM: Primary | ICD-10-CM

## 2018-07-01 PROCEDURE — 3079F DIAST BP 80-89 MM HG: CPT | Mod: CPTII,S$GLB,, | Performed by: FAMILY MEDICINE

## 2018-07-01 PROCEDURE — 3074F SYST BP LT 130 MM HG: CPT | Mod: CPTII,S$GLB,, | Performed by: FAMILY MEDICINE

## 2018-07-01 PROCEDURE — 99214 OFFICE O/P EST MOD 30 MIN: CPT | Mod: 25,S$GLB,, | Performed by: FAMILY MEDICINE

## 2018-07-01 PROCEDURE — 96372 THER/PROPH/DIAG INJ SC/IM: CPT | Mod: S$GLB,,, | Performed by: FAMILY MEDICINE

## 2018-07-01 PROCEDURE — 3008F BODY MASS INDEX DOCD: CPT | Mod: CPTII,S$GLB,, | Performed by: FAMILY MEDICINE

## 2018-07-01 RX ORDER — BENZONATATE 100 MG/1
100 CAPSULE ORAL EVERY 6 HOURS PRN
Qty: 30 CAPSULE | Refills: 1 | Status: SHIPPED | OUTPATIENT
Start: 2018-07-01 | End: 2019-02-21 | Stop reason: ALTCHOICE

## 2018-07-01 RX ORDER — AZITHROMYCIN 250 MG/1
TABLET, FILM COATED ORAL
Qty: 6 TABLET | Refills: 0 | Status: SHIPPED | OUTPATIENT
Start: 2018-07-01 | End: 2018-07-06

## 2018-07-01 RX ORDER — BETAMETHASONE SODIUM PHOSPHATE AND BETAMETHASONE ACETATE 3; 3 MG/ML; MG/ML
9 INJECTION, SUSPENSION INTRA-ARTICULAR; INTRALESIONAL; INTRAMUSCULAR; SOFT TISSUE
Status: COMPLETED | OUTPATIENT
Start: 2018-07-01 | End: 2018-07-01

## 2018-07-01 RX ORDER — ALBUTEROL SULFATE 90 UG/1
2 AEROSOL, METERED RESPIRATORY (INHALATION) EVERY 6 HOURS PRN
Qty: 18 G | Refills: 0 | Status: SHIPPED | OUTPATIENT
Start: 2018-07-01 | End: 2019-04-04

## 2018-07-01 RX ADMIN — BETAMETHASONE SODIUM PHOSPHATE AND BETAMETHASONE ACETATE 9 MG: 3; 3 INJECTION, SUSPENSION INTRA-ARTICULAR; INTRALESIONAL; INTRAMUSCULAR; SOFT TISSUE at 02:07

## 2018-07-01 NOTE — PATIENT INSTRUCTIONS
Bronchitis with Wheezing (Viral or Bacterial: Adult)    Bronchitis is an infection of the air passages. It often occurs during a cold and is usually caused by a virus. Symptoms include cough with mucus (phlegm) and low-grade fever. This illness is contagious during the first few days and is spread through the air by coughing and sneezing, or by direct contact (touching the sick person and then touching your own eyes, nose, or mouth).  If there is a lot of inflammation, air flow is restricted. The air passages may also go into spasm, especially if you have asthma. This causes wheezing and difficulty breathing even in people who do not have asthma.  Bronchitis usually lasts 7 to 14 days. The wheezing should improve with treatment during the first week. An inhaler is often prescribed to relax the air passages and stop wheezing. Antibiotics will be prescribed if your doctor thinks there is also a secondary bacterial infection.  Home care  · If symptoms are severe, rest at home for the first 2 to 3 days. When you go back to your usual activities, don't let yourself get too tired.  · Do not smoke. Also avoid being exposed to secondhand smoke.  · You may use over-the-counter medicine to control fever or pain, unless another medicine was prescribed. Note: If you have chronic liver or kidney disease or have ever had a stomach ulcer or gastrointestinal bleeding, talk with your healthcare provider before using these medicines. Also talk to your provider if you are taking medicine to prevent blood clots.) Aspirin should never be given to anyone younger than 18 years of age who is ill with a viral infection or fever. It may cause severe liver or brain damage.  · Your appetite may be poor, so a light diet is fine. Avoid dehydration by drinking 6 to 8 glasses of fluids per day (such as water, soft drinks, sports drinks, juices, tea, or soup). Extra fluids will help loosen secretions in the nose and lungs.  · Over-the-counter  cough, cold, and sore-throat medicines will not shorten the length of the illness, but they may be helpful to reduce symptoms. (Note: Do not use decongestants if you have high blood pressure.)  · If you were given an inhaler, use it exactly as directed. If you need to use it more often than prescribed, your condition may be worsening. If this happens, contact your healthcare provider.  · If prescribed, finish all antibiotic medicine, even if you are feeling better after only a few days.  Follow-up care  Follow up with your healthcare provider, or as advised. If you had an X-ray or ECG (electrocardiogram), a specialist will review it. You will be notified of any new findings that may affect your care.  Note: If you are age 65 or older, or if you have a chronic lung disease or condition that affects your immune system, or you smoke, talk to your healthcare provider about having a pneumococcal vaccinations and a yearly influenza vaccination (flu shot).  When to seek medical advice  Call your healthcare provider right away if any of these occur:  · Fever of 100.4°F (38°C) or higher  · Coughing up increasing amounts of colored sputum  · Weakness, drowsiness, headache, facial pain, ear pain, or a stiff neck  Call 911, or get immediate medical care  Contact emergency services right away if any of these occur.  · Coughing up blood  · Worsening weakness, drowsiness, headache, or stiff neck  · Increased wheezing not helped with medication, shortness of breath, or pain with breathing  Date Last Reviewed: 9/13/2015  © 4707-5754 [employer]. [address] [place] [address], [place] [address]. All rights reserved. This information is not intended as a substitute for professional medical care. Always follow your healthcare professional's instructions.

## 2018-07-01 NOTE — PROGRESS NOTES
"Subjective:       Patient ID: Nanda Colin III is a 53 y.o. male.    Vitals:  height is 5' 4" (1.626 m) and weight is 91.2 kg (201 lb). His temperature is 98.2 °F (36.8 °C). His blood pressure is 118/83 and his pulse is 87. His respiration is 18 and oxygen saturation is 98%.     Chief Complaint: Cough    Reports wheezing worse at night. Cough is not productive. + tobacco abuse X 30 yrs. Describes chest congestion.      Cough   This is a new problem. The current episode started today. The problem has been unchanged. The cough is productive of sputum. Pertinent negatives include no chest pain, chills, ear pain, eye redness, fever, headaches, myalgias, sore throat, shortness of breath or wheezing. Nothing aggravates the symptoms. He has tried prescription cough suppressant for the symptoms. The treatment provided no relief.     Review of Systems   Constitution: Negative for chills, fever and malaise/fatigue.   HENT: Negative for congestion, ear pain, hoarse voice and sore throat.    Eyes: Negative for discharge and redness.   Cardiovascular: Negative for chest pain, dyspnea on exertion and leg swelling.   Respiratory: Positive for cough and sputum production. Negative for shortness of breath and wheezing.    Musculoskeletal: Negative for myalgias.   Gastrointestinal: Negative for abdominal pain and nausea.   Neurological: Negative for headaches.       Objective:      Physical Exam   Constitutional: He appears well-developed and well-nourished.   HENT:   Head: Normocephalic and atraumatic.   Eyes: EOM are normal. Pupils are equal, round, and reactive to light.   Neck: Normal range of motion. Neck supple.   Cardiovascular: Normal rate, regular rhythm and normal heart sounds.    Pulmonary/Chest: Effort normal. No respiratory distress. He has wheezes (apical end expiratory). He has no rales. He exhibits no tenderness.   Abdominal: Soft.   Lymphadenopathy:     He has no cervical adenopathy.   Nursing note and vitals " reviewed.      Assessment:       1. Acute bronchitis, unspecified organism        Plan:         Acute bronchitis, unspecified organism  -     azithromycin (Z-AMISHA) 250 MG tablet; Take 2 tablets by mouth on day 1; Take 1 tablet by mouth on days 2-5  Dispense: 6 tablet; Refill: 0  -     betamethasone acetate-betamethasone sodium phosphate injection 9 mg; Inject 1.5 mLs (9 mg total) into the muscle one time.  -     benzonatate (TESSALON PERLES) 100 MG capsule; Take 1 capsule (100 mg total) by mouth every 6 (six) hours as needed for Cough.  Dispense: 30 capsule; Refill: 1  -     albuterol 90 mcg/actuation inhaler; Inhale 2 puffs into the lungs every 6 (six) hours as needed for Wheezing. Rescue  Dispense: 18 g; Refill: 0

## 2018-07-01 NOTE — TELEPHONE ENCOUNTER
Reason for Disposition   Requesting regular office appointment    Protocols used: ST INFORMATION ONLY CALL-A-AH    Wife calling requesting an  appointment @ [facility] today.  No appointments available.  Advised another [facility].

## 2018-07-05 ENCOUNTER — OFFICE VISIT (OUTPATIENT)
Dept: INTERNAL MEDICINE | Facility: CLINIC | Age: 53
End: 2018-07-05
Payer: COMMERCIAL

## 2018-07-05 ENCOUNTER — HOSPITAL ENCOUNTER (OUTPATIENT)
Dept: RADIOLOGY | Facility: HOSPITAL | Age: 53
Discharge: HOME | End: 2018-07-05
Attending: INTERNAL MEDICINE
Payer: COMMERCIAL

## 2018-07-05 VITALS
HEART RATE: 100 BPM | HEIGHT: 65 IN | TEMPERATURE: 99 F | DIASTOLIC BLOOD PRESSURE: 78 MMHG | OXYGEN SATURATION: 97 % | WEIGHT: 193.81 LBS | BODY MASS INDEX: 32.29 KG/M2 | SYSTOLIC BLOOD PRESSURE: 134 MMHG

## 2018-07-05 DIAGNOSIS — R05.9 COUGH: ICD-10-CM

## 2018-07-05 DIAGNOSIS — R06.2 WHEEZING: ICD-10-CM

## 2018-07-05 DIAGNOSIS — J20.9 BRONCHITIS WITH BRONCHOSPASM: ICD-10-CM

## 2018-07-05 DIAGNOSIS — R05.9 COUGH: Primary | ICD-10-CM

## 2018-07-05 PROCEDURE — 71046 X-RAY EXAM CHEST 2 VIEWS: CPT | Mod: TC

## 2018-07-05 PROCEDURE — 99999 PR PBB SHADOW E&M-EST. PATIENT-LVL III: CPT | Mod: PBBFAC,,, | Performed by: INTERNAL MEDICINE

## 2018-07-05 PROCEDURE — 3008F BODY MASS INDEX DOCD: CPT | Mod: CPTII,S$GLB,, | Performed by: INTERNAL MEDICINE

## 2018-07-05 PROCEDURE — 3075F SYST BP GE 130 - 139MM HG: CPT | Mod: CPTII,S$GLB,, | Performed by: INTERNAL MEDICINE

## 2018-07-05 PROCEDURE — 71046 X-RAY EXAM CHEST 2 VIEWS: CPT | Mod: 26,,, | Performed by: RADIOLOGY

## 2018-07-05 PROCEDURE — 3078F DIAST BP <80 MM HG: CPT | Mod: CPTII,S$GLB,, | Performed by: INTERNAL MEDICINE

## 2018-07-05 PROCEDURE — 94640 AIRWAY INHALATION TREATMENT: CPT | Mod: S$GLB,,, | Performed by: INTERNAL MEDICINE

## 2018-07-05 PROCEDURE — 99214 OFFICE O/P EST MOD 30 MIN: CPT | Mod: 25,S$GLB,, | Performed by: INTERNAL MEDICINE

## 2018-07-05 RX ORDER — ALBUTEROL SULFATE 0.83 MG/ML
2.5 SOLUTION RESPIRATORY (INHALATION) EVERY 6 HOURS PRN
Qty: 50 BOX | Refills: 1 | Status: SHIPPED | OUTPATIENT
Start: 2018-07-05 | End: 2019-04-04

## 2018-07-05 RX ORDER — PROMETHAZINE HYDROCHLORIDE AND CODEINE PHOSPHATE 6.25; 1 MG/5ML; MG/5ML
5 SOLUTION ORAL NIGHTLY PRN
Qty: 180 ML | Refills: 0 | Status: SHIPPED | OUTPATIENT
Start: 2018-07-05 | End: 2018-07-15

## 2018-07-05 RX ORDER — LEVOFLOXACIN 500 MG/1
500 TABLET, FILM COATED ORAL DAILY
Qty: 10 TABLET | Refills: 0 | Status: SHIPPED | OUTPATIENT
Start: 2018-07-05 | End: 2018-07-15

## 2018-07-05 RX ORDER — ALBUTEROL SULFATE 0.83 MG/ML
2.5 SOLUTION RESPIRATORY (INHALATION)
Status: COMPLETED | OUTPATIENT
Start: 2018-07-05 | End: 2018-07-05

## 2018-07-05 RX ADMIN — ALBUTEROL SULFATE 2.5 MG: 0.83 SOLUTION RESPIRATORY (INHALATION) at 07:07

## 2018-07-06 NOTE — PROGRESS NOTES
Name and   verified  initiated nebulizer albuterol treat per physicians orders patient tolerated well

## 2018-07-06 NOTE — PROGRESS NOTES
Subjective:       Patient ID: Nanda Colin III is a 53 y.o. male.    Chief Complaint: Cough (Body Aches, Fever, Chills, wheezing)    Cough   This is a new problem. The current episode started in the past 7 days. The problem has been gradually worsening. The problem occurs every few minutes. The cough is productive of sputum. Associated symptoms include chills, myalgias, shortness of breath and wheezing. Pertinent negatives include no chest pain, fever, headaches, postnasal drip or sore throat. Associated symptoms comments: Reports fever but unmeasured. The symptoms are aggravated by lying down. Treatments tried: zpack, tessalon perles, proair, steroid injection. The treatment provided no relief. His past medical history is significant for bronchitis.     Review of Systems   Constitutional: Positive for chills. Negative for activity change, appetite change and fever.   HENT: Negative for congestion, postnasal drip and sore throat.    Respiratory: Positive for cough, shortness of breath and wheezing.    Cardiovascular: Negative for chest pain and palpitations.   Gastrointestinal: Negative for abdominal pain, blood in stool, constipation, diarrhea, nausea and vomiting.   Genitourinary: Negative for decreased urine volume, difficulty urinating, flank pain and frequency.   Musculoskeletal: Positive for myalgias. Negative for arthralgias.   Neurological: Negative for dizziness, weakness and headaches.       Objective:      Physical Exam   Constitutional: He is oriented to person, place, and time. He appears well-developed and well-nourished. No distress.   HENT:   Head: Normocephalic and atraumatic.   Right Ear: External ear normal.   Left Ear: External ear normal.   Eyes: Conjunctivae and EOM are normal. Pupils are equal, round, and reactive to light.   Neck: Normal range of motion. Neck supple. No thyromegaly present.   Cardiovascular: Normal rate and regular rhythm.    Pulmonary/Chest: Effort normal. He has  decreased breath sounds in the right lower field and the left lower field. He has wheezes in the right upper field, the right middle field, the right lower field, the left upper field, the left middle field and the left lower field. He has no rhonchi. He has no rales.           Abdominal: Soft. Bowel sounds are normal. He exhibits no mass. There is no tenderness. There is no rebound and no guarding.   Musculoskeletal: Normal range of motion.   Lymphadenopathy:     He has no cervical adenopathy.   Neurological: He is alert and oriented to person, place, and time. He has normal reflexes. He displays normal reflexes. No cranial nerve deficit. He exhibits normal muscle tone. Coordination normal.   Skin: Skin is warm and dry.       Assessment:       1. Cough    2. Wheezing    3. Bronchitis with bronchospasm        Plan:   Nanda was seen today for cough.    Diagnoses and all orders for this visit:    Cough  -     X-Ray Chest PA And Lateral; Future    Wheezing  -     X-Ray Chest PA And Lateral; Future    Bronchitis with bronchospasm    Other orders  -     albuterol nebulizer solution 2.5 mg; Take 3 mLs (2.5 mg total) by nebulization one time.  -     levoFLOXacin (LEVAQUIN) 500 MG tablet; Take 1 tablet (500 mg total) by mouth once daily. for 10 days  -     albuterol (PROVENTIL) 2.5 mg /3 mL (0.083 %) nebulizer solution; Take 3 mLs (2.5 mg total) by nebulization every 6 (six) hours as needed for Wheezing. Rescue  -     promethazine-codeine 6.25-10 mg/5 ml (PHENERGAN WITH CODEINE) 6.25-10 mg/5 mL syrup; Take 5 mLs by mouth nightly as needed.

## 2018-08-14 ENCOUNTER — HOSPITAL ENCOUNTER (OUTPATIENT)
Dept: RADIOLOGY | Facility: HOSPITAL | Age: 53
Discharge: HOME | End: 2018-08-14
Attending: FAMILY MEDICINE
Payer: COMMERCIAL

## 2018-08-14 ENCOUNTER — OFFICE VISIT (OUTPATIENT)
Dept: INTERNAL MEDICINE | Facility: CLINIC | Age: 53
End: 2018-08-14
Attending: FAMILY MEDICINE
Payer: COMMERCIAL

## 2018-08-14 VITALS
DIASTOLIC BLOOD PRESSURE: 66 MMHG | SYSTOLIC BLOOD PRESSURE: 108 MMHG | HEIGHT: 65 IN | WEIGHT: 196.69 LBS | BODY MASS INDEX: 32.77 KG/M2 | OXYGEN SATURATION: 94 % | HEART RATE: 76 BPM

## 2018-08-14 DIAGNOSIS — L98.9 SKIN LESION: ICD-10-CM

## 2018-08-14 DIAGNOSIS — F17.200 SMOKER: ICD-10-CM

## 2018-08-14 DIAGNOSIS — J18.9 PNEUMONIA DUE TO INFECTIOUS ORGANISM, UNSPECIFIED LATERALITY, UNSPECIFIED PART OF LUNG: Primary | ICD-10-CM

## 2018-08-14 DIAGNOSIS — I10 HYPERTENSION, ESSENTIAL: ICD-10-CM

## 2018-08-14 DIAGNOSIS — Z12.11 COLON CANCER SCREENING: ICD-10-CM

## 2018-08-14 DIAGNOSIS — J18.9 PNEUMONIA DUE TO INFECTIOUS ORGANISM, UNSPECIFIED LATERALITY, UNSPECIFIED PART OF LUNG: ICD-10-CM

## 2018-08-14 DIAGNOSIS — K63.5 POLYP OF COLON, UNSPECIFIED PART OF COLON, UNSPECIFIED TYPE: ICD-10-CM

## 2018-08-14 PROCEDURE — 99214 OFFICE O/P EST MOD 30 MIN: CPT | Mod: S$GLB,,, | Performed by: FAMILY MEDICINE

## 2018-08-14 PROCEDURE — 71046 X-RAY EXAM CHEST 2 VIEWS: CPT | Mod: TC

## 2018-08-14 PROCEDURE — 99999 PR PBB SHADOW E&M-EST. PATIENT-LVL IV: CPT | Mod: PBBFAC,,, | Performed by: FAMILY MEDICINE

## 2018-08-14 PROCEDURE — 3074F SYST BP LT 130 MM HG: CPT | Mod: CPTII,S$GLB,, | Performed by: FAMILY MEDICINE

## 2018-08-14 PROCEDURE — 3008F BODY MASS INDEX DOCD: CPT | Mod: CPTII,S$GLB,, | Performed by: FAMILY MEDICINE

## 2018-08-14 PROCEDURE — 3078F DIAST BP <80 MM HG: CPT | Mod: CPTII,S$GLB,, | Performed by: FAMILY MEDICINE

## 2018-08-14 PROCEDURE — 71046 X-RAY EXAM CHEST 2 VIEWS: CPT | Mod: 26,,, | Performed by: RADIOLOGY

## 2018-08-14 NOTE — PROGRESS NOTES
Subjective:       Patient ID: Nanda Colin III is a 53 y.o. male.    Chief Complaint: Follow-up (from pneumonia)    HPI  Review of Systems   Constitutional: Negative for chills, fatigue, fever and unexpected weight change.   HENT: Negative for congestion and trouble swallowing.    Eyes: Negative for redness and visual disturbance.   Respiratory: Negative for cough, chest tightness and shortness of breath.    Cardiovascular: Negative for chest pain, palpitations and leg swelling.   Gastrointestinal: Negative for abdominal pain and blood in stool.   Genitourinary: Negative for difficulty urinating and hematuria.   Musculoskeletal: Negative for arthralgias, back pain, gait problem, joint swelling, myalgias and neck pain.   Skin: Positive for rash. Negative for color change.   Neurological: Negative for tremors, speech difficulty, weakness, numbness and headaches.   Hematological: Negative for adenopathy. Does not bruise/bleed easily.   Psychiatric/Behavioral: Negative for behavioral problems, confusion and sleep disturbance. The patient is not nervous/anxious.        Objective:      Physical Exam   Constitutional: He is oriented to person, place, and time. He appears well-developed and well-nourished.   Eyes: No scleral icterus.   Neck: Normal range of motion. Neck supple. Carotid bruit is not present.   Cardiovascular: Normal rate, regular rhythm, normal heart sounds and intact distal pulses. Exam reveals no gallop and no friction rub.   No murmur heard.  Pulmonary/Chest: Effort normal and breath sounds normal. No respiratory distress. He has no wheezes. He has no rales.   Abdominal: Soft. Bowel sounds are normal. He exhibits no distension and no mass. There is no tenderness. There is no rebound and no guarding.   Musculoskeletal: He exhibits no edema.   Neurological: He is alert and oriented to person, place, and time. He displays no tremor. No cranial nerve deficit. Coordination and gait normal.   Skin: Skin is  warm and dry. No rash noted. He is not diaphoretic. No erythema.   Psychiatric: He has a normal mood and affect. His behavior is normal. Judgment and thought content normal.   Nursing note and vitals reviewed.      Assessment:       1. Pneumonia due to infectious organism, unspecified laterality, unspecified part of lung    2. Skin lesion    3. Hypertension, essential    4. Smoker    5. Polyp of colon, unspecified part of colon, unspecified type    6. Colon cancer screening        Plan:   Nanda was seen today for follow-up.    Diagnoses and all orders for this visit:    Pneumonia due to infectious organism, unspecified laterality, unspecified part of lung  -     X-Ray Chest PA And Lateral; Future    Skin lesion  -     Ambulatory referral to Dermatology    Hypertension, essential    Smoker    Polyp of colon, unspecified part of colon, unspecified type  -     Case request GI: COLONOSCOPY    Colon cancer screening  -     Case request GI: COLONOSCOPY      See meds, orders, follow up, routing and instructions sections of encounter.  SUBJECTIVE:  A 53-year-old gentleman following up from pneumonia with Dr. Castro.    States he is feeling back to normal.  He has no cough, congestion, fever,   chills, or dyspnea.    Additional orders for followups, please see EPIC.  His chest x-ray today was   read as within normal limits.      ANTONIO/HN  dd: 08/14/2018 14:46:39 (CDT)  td: 08/15/2018 00:56:45 (CDT)  Doc ID   #[id number]  Job ID #[id number]    CC:

## 2018-08-28 ENCOUNTER — OFFICE VISIT (OUTPATIENT)
Dept: DERMATOLOGY | Facility: CLINIC | Age: 53
End: 2018-08-28
Attending: FAMILY MEDICINE
Payer: COMMERCIAL

## 2018-08-28 VITALS — BODY MASS INDEX: 32.62 KG/M2 | WEIGHT: 196 LBS

## 2018-08-28 DIAGNOSIS — L81.4 LENTIGINES: ICD-10-CM

## 2018-08-28 DIAGNOSIS — D22.9 NEVUS: ICD-10-CM

## 2018-08-28 DIAGNOSIS — L82.1 SEBORRHEIC KERATOSES: Primary | ICD-10-CM

## 2018-08-28 PROCEDURE — 99999 PR PBB SHADOW E&M-EST. PATIENT-LVL III: CPT | Mod: PBBFAC,,, | Performed by: DERMATOLOGY

## 2018-08-28 PROCEDURE — 3008F BODY MASS INDEX DOCD: CPT | Mod: CPTII,S$GLB,, | Performed by: DERMATOLOGY

## 2018-08-28 PROCEDURE — 99202 OFFICE O/P NEW SF 15 MIN: CPT | Mod: S$GLB,,, | Performed by: DERMATOLOGY

## 2018-08-28 NOTE — LETTER
August 28, 2018      Horaec Weber MD  [address]  [facility] [address]           [place] [facility]  [address]  [place] [address]  Phone: [phone]  Fax: [phone]          Patient: Nanda Colin III   MR Number: [id number]   YOB: 1965   Date of Visit: 8/28/2018       Dear Dr. Horace Weber:    Thank you for referring Nanda Colin to me for evaluation. Attached you will find relevant portions of my assessment and plan of care.    If you have questions, please do not hesitate to call me. I look forward to following Nanda Colin along with you.    Sincerely,    Jacqui Gary MD    Enclosure  CC:  No Recipients    If you would like to receive this communication electronically, please contact [email or web address] or [phone] to request more information on [employer] access.    For providers and/or their staff who would like to refer a patient to [facility], please contact us through our one-stop-shop provider referral line, [facility], at [phone].    If you feel you have received this communication in error or would no longer like to receive these types of communications, please e-mail [email or web address]

## 2018-08-28 NOTE — PROGRESS NOTES
Subjective:       Patient ID:  Nanda Colin III is a 53 y.o. male who presents for   Chief Complaint   Patient presents with    Spot     face,      History of Present Illness: The patient presents with chief complaint of spots.  Location: face  Duration: months  Signs/Symptoms: none    Prior treatments: none          Review of Systems   Constitutional: Negative for fever.   Skin: Negative for itching and rash.   Hematologic/Lymphatic: Does not bruise/bleed easily.        Objective:    Physical Exam   Constitutional: He appears well-developed and well-nourished. No distress.   Neurological: He is alert and oriented to person, place, and time. He is not disoriented.   Psychiatric: He has a normal mood and affect.   Skin:   Areas Examined (abnormalities noted in diagram):   Head / Face Inspection Performed  Neck Inspection Performed  Chest / Axilla Inspection Performed  Back Inspection Performed  RUE Inspected  LUE Inspection Performed                   Diagram Legend     Erythematous scaling macule/papule c/w actinic keratosis       Vascular papule c/w angioma      Pigmented verrucoid papule/plaque c/w seborrheic keratosis      Yellow umbilicated papule c/w sebaceous hyperplasia      Irregularly shaped tan macule c/w lentigo     1-2 mm smooth white papules consistent with Milia      Movable subcutaneous cyst with punctum c/w epidermal inclusion cyst      Subcutaneous movable cyst c/w pilar cyst      Firm pink to brown papule c/w dermatofibroma      Pedunculated fleshy papule(s) c/w skin tag(s)      Evenly pigmented macule c/w junctional nevus     Mildly variegated pigmented, slightly irregular-bordered macule c/w mildly atypical nevus      Flesh colored to evenly pigmented papule c/w intradermal nevus       Pink pearly papule/plaque c/w basal cell carcinoma      Erythematous hyperkeratotic cursted plaque c/w SCC      Surgical scar with no sign of skin cancer recurrence      Open and closed comedones       "Inflammatory papules and pustules      Verrucoid papule consistent consistent with wart     Erythematous eczematous patches and plaques     Dystrophic onycholytic nail with subungual debris c/w onychomycosis     Umbilicated papule    Erythematous-base heme-crusted tan verrucoid plaque consistent with inflamed seborrheic keratosis     Erythematous Silvery Scaling Plaque c/w Psoriasis     See annotation      Assessment / Plan:        Seborrheic keratoses  Brochure provided      Nevus  The "ABCD" rules to observe pigmented lesions were reviewed.      Lentigines  sunscreen           Follow-up if symptoms worsen or fail to improve.  "

## 2018-08-29 ENCOUNTER — CLINICAL SUPPORT (OUTPATIENT)
Dept: SMOKING CESSATION | Facility: CLINIC | Age: 53
End: 2018-08-29
Payer: COMMERCIAL

## 2018-08-29 DIAGNOSIS — F17.200 NICOTINE DEPENDENCE: Primary | ICD-10-CM

## 2018-08-29 PROCEDURE — 99406 BEHAV CHNG SMOKING 3-10 MIN: CPT | Mod: S$GLB,,, | Performed by: INTERNAL MEDICINE

## 2018-09-11 DIAGNOSIS — Z12.11 SCREEN FOR COLON CANCER: Primary | ICD-10-CM

## 2018-09-11 RX ORDER — SODIUM, POTASSIUM,MAG SULFATES 17.5-3.13G
SOLUTION, RECONSTITUTED, ORAL ORAL
Qty: 354 ML | Refills: 0 | Status: SHIPPED | OUTPATIENT
Start: 2018-09-11 | End: 2018-09-19

## 2018-09-19 ENCOUNTER — TELEPHONE (OUTPATIENT)
Dept: ENDOSCOPY | Facility: HOSPITAL | Age: 53
End: 2018-09-19

## 2018-09-19 DIAGNOSIS — Z12.11 SCREEN FOR COLON CANCER: Primary | ICD-10-CM

## 2018-09-19 RX ORDER — POLYETHYLENE GLYCOL 3350, SODIUM SULFATE ANHYDROUS, SODIUM BICARBONATE, SODIUM CHLORIDE, POTASSIUM CHLORIDE 236; 22.74; 6.74; 5.86; 2.97 G/4L; G/4L; G/4L; G/4L; G/4L
4 POWDER, FOR SOLUTION ORAL ONCE
Qty: 4000 ML | Refills: 0 | Status: SHIPPED | OUTPATIENT
Start: 2018-09-19 | End: 2018-09-19

## 2018-11-06 RX ORDER — ATORVASTATIN CALCIUM 20 MG/1
TABLET, FILM COATED ORAL
Qty: 90 TABLET | Refills: 0 | Status: SHIPPED | OUTPATIENT
Start: 2018-11-06 | End: 2019-02-12 | Stop reason: SDUPTHER

## 2018-11-06 RX ORDER — TAMSULOSIN HYDROCHLORIDE 0.4 MG/1
CAPSULE ORAL
Qty: 90 CAPSULE | Refills: 0 | Status: SHIPPED | OUTPATIENT
Start: 2018-11-06 | End: 2019-02-12 | Stop reason: SDUPTHER

## 2019-01-14 ENCOUNTER — HOSPITAL ENCOUNTER (OUTPATIENT)
Facility: HOSPITAL | Age: 54
Discharge: HOME | End: 2019-01-14
Attending: INTERNAL MEDICINE | Admitting: INTERNAL MEDICINE
Payer: COMMERCIAL

## 2019-01-14 ENCOUNTER — ANESTHESIA (OUTPATIENT)
Dept: ENDOSCOPY | Facility: HOSPITAL | Age: 54
End: 2019-01-14
Payer: COMMERCIAL

## 2019-01-14 ENCOUNTER — ANESTHESIA EVENT (OUTPATIENT)
Dept: ENDOSCOPY | Facility: HOSPITAL | Age: 54
End: 2019-01-14
Payer: COMMERCIAL

## 2019-01-14 VITALS
HEIGHT: 65 IN | OXYGEN SATURATION: 98 % | SYSTOLIC BLOOD PRESSURE: 147 MMHG | TEMPERATURE: 98 F | WEIGHT: 190 LBS | DIASTOLIC BLOOD PRESSURE: 88 MMHG | RESPIRATION RATE: 18 BRPM | HEART RATE: 65 BPM | BODY MASS INDEX: 31.65 KG/M2

## 2019-01-14 DIAGNOSIS — K63.5 POLYP OF COLON, UNSPECIFIED PART OF COLON, UNSPECIFIED TYPE: ICD-10-CM

## 2019-01-14 PROCEDURE — 63600175 PHARM REV CODE 636 W HCPCS: Performed by: NURSE ANESTHETIST, CERTIFIED REGISTERED

## 2019-01-14 PROCEDURE — 88305 TISSUE SPECIMEN TO PATHOLOGY - SURGERY: ICD-10-PCS | Mod: 26,,, | Performed by: PATHOLOGY

## 2019-01-14 PROCEDURE — 88305 TISSUE EXAM BY PATHOLOGIST: CPT | Performed by: PATHOLOGY

## 2019-01-14 PROCEDURE — 88305 TISSUE EXAM BY PATHOLOGIST: CPT | Mod: 26,,, | Performed by: PATHOLOGY

## 2019-01-14 PROCEDURE — 27201012 HC FORCEPS, HOT/COLD, DISP: Performed by: INTERNAL MEDICINE

## 2019-01-14 PROCEDURE — 45380 PR COLONOSCOPY,BIOPSY: ICD-10-PCS | Mod: 33,,, | Performed by: INTERNAL MEDICINE

## 2019-01-14 PROCEDURE — 45380 COLONOSCOPY AND BIOPSY: CPT | Mod: 33,,, | Performed by: INTERNAL MEDICINE

## 2019-01-14 PROCEDURE — 45380 COLONOSCOPY AND BIOPSY: CPT | Performed by: INTERNAL MEDICINE

## 2019-01-14 PROCEDURE — 37000009 HC ANESTHESIA EA ADD 15 MINS: Performed by: INTERNAL MEDICINE

## 2019-01-14 PROCEDURE — E9220 PRA ENDO ANESTHESIA: HCPCS | Mod: 33,,, | Performed by: NURSE ANESTHETIST, CERTIFIED REGISTERED

## 2019-01-14 PROCEDURE — E9220 PRA ENDO ANESTHESIA: ICD-10-PCS | Mod: 33,,, | Performed by: NURSE ANESTHETIST, CERTIFIED REGISTERED

## 2019-01-14 PROCEDURE — 37000008 HC ANESTHESIA 1ST 15 MINUTES: Performed by: INTERNAL MEDICINE

## 2019-01-14 PROCEDURE — 25000003 PHARM REV CODE 250: Performed by: INTERNAL MEDICINE

## 2019-01-14 RX ORDER — PROPOFOL 10 MG/ML
VIAL (ML) INTRAVENOUS CONTINUOUS PRN
Status: DISCONTINUED | OUTPATIENT
Start: 2019-01-14 | End: 2019-01-14

## 2019-01-14 RX ORDER — LIDOCAINE HCL/PF 100 MG/5ML
SYRINGE (ML) INTRAVENOUS
Status: DISCONTINUED | OUTPATIENT
Start: 2019-01-14 | End: 2019-01-14

## 2019-01-14 RX ORDER — SODIUM CHLORIDE 9 MG/ML
INJECTION, SOLUTION INTRAVENOUS CONTINUOUS
Status: DISCONTINUED | OUTPATIENT
Start: 2019-01-14 | End: 2019-01-14 | Stop reason: HOSPADM

## 2019-01-14 RX ORDER — SODIUM CHLORIDE 0.9 % (FLUSH) 0.9 %
3 SYRINGE (ML) INJECTION
Status: DISCONTINUED | OUTPATIENT
Start: 2019-01-14 | End: 2019-01-14 | Stop reason: HOSPADM

## 2019-01-14 RX ORDER — PROPOFOL 10 MG/ML
VIAL (ML) INTRAVENOUS
Status: DISCONTINUED | OUTPATIENT
Start: 2019-01-14 | End: 2019-01-14

## 2019-01-14 RX ADMIN — LIDOCAINE HYDROCHLORIDE 50 MG: 20 INJECTION, SOLUTION INTRAVENOUS at 11:01

## 2019-01-14 RX ADMIN — PROPOFOL 175 MCG/KG/MIN: 10 INJECTION, EMULSION INTRAVENOUS at 11:01

## 2019-01-14 RX ADMIN — PROPOFOL 80 MG: 10 INJECTION, EMULSION INTRAVENOUS at 11:01

## 2019-01-14 RX ADMIN — SODIUM CHLORIDE: 0.9 INJECTION, SOLUTION INTRAVENOUS at 11:01

## 2019-01-14 NOTE — ANESTHESIA POSTPROCEDURE EVALUATION
"Anesthesia Post Evaluation    Patient: Nanda Colin III    Procedure(s) Performed: Procedure(s) (LRB):  COLONOSCOPY (N/A)    Final Anesthesia Type: general  Patient location during evaluation: GI PACU  Patient participation: Yes- Able to Participate  Level of consciousness: awake and alert  Post-procedure vital signs: reviewed and stable  Pain management: adequate  Airway patency: patent  PONV status at discharge: No PONV  Anesthetic complications: no      Cardiovascular status: blood pressure returned to baseline  Respiratory status: unassisted  Hydration status: euvolemic  Follow-up not needed.        Visit Vitals  BP (!) 147/88   Pulse 65   Temp 36.7 °C (98.1 °F)   Resp 18   Ht 5' 5" (1.651 m)   Wt 86.2 kg (190 lb)   SpO2 98%   BMI 31.62 kg/m²       Pain/Fani Score: Fani Score: 10 (1/14/2019 11:55 AM)        "

## 2019-01-14 NOTE — H&P
[facility]  History & Physical    Subjective:      Chief Complaint/Reason for Admission:     Colonoscopy    Nanda Colin III is a 53 y.o. male.    Past Medical History:   Diagnosis Date    BPH (benign prostatic hypertrophy)     Hernia     Hyperlipidemia     Hypertension, essential 2015    Incarcerated umbilical hernia 2016    Umbilical hernia      Past Surgical History:   Procedure Laterality Date    COLONOSCOPY N/A 2015    Performed by [facility] (4[facility])    HERNIA REPAIR      REPAIR-HERNIA-UMBILICAL N/A 2016    Performed by Joseph Nava MD at [facility] OR 2ND FLR    VASECTOMY      WISDOM TOOTH EXTRACTION       Family History   Problem Relation Age of Onset    Stroke Father     Heart disease Paternal Uncle     Kidney disease Paternal Uncle     Depression Paternal Uncle     Cancer Maternal Grandfather     COPD Maternal Grandfather     Cancer Paternal Grandmother     COPD Paternal Aunt      Social History     Tobacco Use    Smoking status: Current Some Day Smoker     Last attempt to quit: 10/15/2017     Years since quittin.2    Smokeless tobacco: Never Used    Tobacco comment: half a pack per week   Substance Use Topics    Alcohol use: Yes     Alcohol/week: 0.0 oz     Comment: socially    Drug use: No       PTA Medications   Medication Sig    atorvastatin (LIPITOR) 20 MG tablet TAKE 1 TABLET(20 MG) BY MOUTH EVERY DAY    lisinopril 10 MG tablet Take 1 tablet (10 mg total) by mouth once daily.    tamsulosin (FLOMAX) 0.4 mg Cap TAKE 1 CAPSULE(0.4 MG) BY MOUTH EVERY DAY    albuterol (PROVENTIL) 2.5 mg /3 mL (0.083 %) nebulizer solution Take 3 mLs (2.5 mg total) by nebulization every 6 (six) hours as needed for Wheezing. Rescue    albuterol 90 mcg/actuation inhaler Inhale 2 puffs into the lungs every 6 (six) hours as needed for Wheezing. Rescue    benzonatate (TESSALON PERLES) 100 MG capsule Take 1 capsule (100 mg  total) by mouth every 6 (six) hours as needed for Cough.    meloxicam (MOBIC) 15 MG tablet Take 1 tablet (15 mg total) by mouth once daily.    nicotine polacrilex (NICORETTE) 4 MG Gum Take 1 each (4 mg total) by mouth as needed (Maximum 15 pieces/day.). (Generic preferred. Member of [facility] [employer]ess[employer])     Review of patient's allergies indicates:  No Known Allergies     Review of Systems   Constitutional: Negative for chills, fever and weight loss.   Respiratory: Negative for shortness of breath and wheezing.    Cardiovascular: Negative for chest pain.   Gastrointestinal: Negative for abdominal pain.       Objective:      Vital Signs (Most Recent)  Temp: 98.1 °F (36.7 °C) (01/14/19 1002)  Pulse: 72 (01/14/19 1002)  Resp: 16 (01/14/19 1002)  BP: 133/80 (01/14/19 1002)  SpO2: 98 % (01/14/19 1002)    Vital Signs Range (Last 24H):  Temp:  [98.1 °F (36.7 °C)]   Pulse:  [72]   Resp:  [16]   BP: (133)/(80)   SpO2:  [98 %]     Physical Exam   Constitutional: He appears well-developed and well-nourished.   Cardiovascular: Normal rate.   Abdominal: Soft.   Skin: Skin is warm and dry.   Psychiatric: He has a normal mood and affect. His behavior is normal. Judgment and thought content normal.            Assessment:      Active Hospital Problems    Diagnosis  POA    Colon polyps [K63.5]  Yes      Resolved Hospital Problems   No resolved problems to display.       Plan:    Surveillance colonoscopy for history of colon polyp

## 2019-01-14 NOTE — [employer] PATIENT INSTRUCTIONS
Discharge Summary/Instructions after an Endoscopic Procedure  Patient Name: Nanda Colin  Patient MRN: [id number]  Patient YOB: 1965  Monday, January 14, 2019  Fritz Franco MD  RESTRICTIONS:  During your procedure today, you received medications for sedation.  These   medications may affect your judgment, balance and coordination.  Therefore,   for 24 hours, you have the following restrictions:   - DO NOT drive a car, operate machinery, make legal/financial decisions,   sign important papers or drink alcohol.    ACTIVITY:  Today: no heavy lifting, straining or running due to procedural   sedation/anesthesia.  The following day: return to full activity including work.  DIET:  Eat and drink normally unless instructed otherwise.     TREATMENT FOR COMMON SIDE EFFECTS:  - Mild abdominal pain, nausea, belching, bloating or excessive gas:  rest,   eat lightly and use a heating pad.  - Sore Throat: treat with throat lozenges and/or gargle with warm salt   water.  - Because air was used during the procedure, expelling large amounts of air   from your rectum or belching is normal.  - If a bowel prep was taken, you may not have a bowel movement for 1-3 days.    This is normal.  SYMPTOMS TO WATCH FOR AND REPORT TO YOUR PHYSICIAN:  1. Abdominal pain or bloating, other than gas cramps.  2. Chest pain.  3. Back pain.  4. Signs of infection such as: chills or fever occurring within 24 hours   after the procedure.  5. Rectal bleeding, which would show as bright red, maroon, or black stools.   (A tablespoon of blood from the rectum is not serious, especially if   hemorrhoids are present.)  6. Vomiting.  7. Weakness or dizziness.  GO DIRECTLY TO THE NEAREST EMERGENCY ROOM IF YOU HAVE ANY OF THE FOLLOWING:      Difficulty breathing              Chills and/or fever over 101 F   Persistent vomiting and/or vomiting blood   Severe abdominal pain   Severe chest pain   Black, tarry stools   Bleeding- more than one  tablespoon   Any other symptom or condition that you feel may need urgent attention  Your doctor recommends these additional instructions:  If any biopsies were taken, your doctors clinic will contact you in 1 to 2   weeks with any results.  - Discharge patient to home.   - Await pathology results.   - Telephone endoscopist for pathology results in 2 weeks.   - Repeat colonoscopy in 3 - 5 years for surveillance.   - The findings and recommendations were discussed with the patient.   - Return to referring physician.  For questions, problems or results please call your physician - Fritz Franco MD at Work:  [phone].  [facility], EMERGENCY ROOM PHONE NUMBER: [phone]  IF A COMPLICATION OR EMERGENCY SITUATION ARISES AND YOU ARE UNABLE TO REACH   YOUR PHYSICIAN - GO DIRECTLY TO THE EMERGENCY ROOM.  Fritz Franco MD  1/14/2019 11:57:49 AM  This report has been verified and signed electronically.  [employer]

## 2019-01-14 NOTE — TRANSFER OF CARE
"Anesthesia Transfer of Care Note    Patient: Nanda Colin III    Procedure(s) Performed: Procedure(s) (LRB):  COLONOSCOPY (N/A)    Patient location: PACU    Anesthesia Type: general    Transport from OR: Transported from OR on 100% O2 by closed face mask with adequate spontaneous ventilation    Post pain: adequate analgesia    Post assessment: no apparent anesthetic complications    Post vital signs: stable    Level of consciousness: awake    Nausea/Vomiting: no nausea/vomiting    Complications: none    Transfer of care protocol was followed      Last vitals:   Visit Vitals  /80   Pulse 72   Temp 36.7 °C (98.1 °F)   Resp 16   Ht 5' 5" (1.651 m)   Wt 86.2 kg (190 lb)   SpO2 98%   BMI 31.62 kg/m²     "

## 2019-01-14 NOTE — ANESTHESIA PREPROCEDURE EVALUATION
01/14/2019  Nanda Colin III is a 53 y.o., male.    Anesthesia Evaluation    I have reviewed the Patient Summary Reports.     I have reviewed the Medications.     Review of Systems  Anesthesia Hx:  No problems with previous Anesthesia Denies Hx of Anesthetic complications   Denies Personal Hx of Anesthesia complications.   Hematology/Oncology:  Hematology Normal   Oncology Normal     EENT/Dental:EENT/Dental Normal   Cardiovascular:   Exercise tolerance: good Hypertension    Pulmonary:  Pulmonary Normal    Renal/:  Renal/ Normal     Hepatic/GI:  Hepatic/GI Normal    Musculoskeletal:  Musculoskeletal Normal    Neurological:  Neurology Normal    Endocrine:  Endocrine Normal    Dermatological:  Skin Normal    Psych:  Psychiatric Normal           Physical Exam  General:  Well nourished    Airway/Jaw/Neck:  Airway Findings: Mouth Opening: Small, but > 3cm Tongue: Normal  General Airway Assessment: Adult  Mallampati: II  Improves to II with phonation.  TM Distance: Normal, at least 6 cm        Eyes/Ears/Nose:  EYES/EARS/NOSE FINDINGS: Normal   Dental:  Dental Findings: In tact   Chest/Lungs:  Chest/Lungs Clear    Heart/Vascular:  Heart Findings: Normal Heart murmur: negative Vascular Findings: Normal    Abdomen:  Abdomen Findings: Normal    Musculoskeletal:  Musculoskeletal Findings: Normal   Skin:  Skin Findings: Normal    Mental Status:  Mental Status Findings: Normal        Anesthesia Plan  Type of Anesthesia, risks & benefits discussed:  Anesthesia Type:  general  Patient's Preference: General  Intra-op Monitoring Plan: standard ASA monitors  Intra-op Monitoring Plan Comments:   Post Op Pain Control Plan:   Post Op Pain Control Plan Comments:   Induction:   IV  Beta Blocker:  Patient is not currently on a Beta-Blocker (No further documentation required).       Informed Consent: Patient understands risks  and agrees with Anesthesia plan.  Questions answered. Anesthesia consent signed with patient.  ASA Score: 2     Day of Surgery Review of History & Physical:    H&P update referred to the surgeon.         Ready For Surgery From Anesthesia Perspective.

## 2019-01-14 NOTE — DISCHARGE INSTRUCTIONS
Colonoscopy     A camera attached to a flexible tube with a viewing lens is used to take video pictures.     Colonoscopy is a test to view the inside of your lower digestive tract (colon and rectum). Sometimes it can show the last part of the small intestine (ileum). During the test, small pieces of tissue may be removed for testing. This is called a biopsy. Small growths, such as polyps, may also be removed.   Why is colonoscopy done?  The test is done to help look for colon cancer. And it can help find the source of abdominal pain, bleeding, and changes in bowel habits. It may be needed once a year, depending on factors such as your:  · Age  · Health history  · Family health history  · Symptoms  · Results from any prior colonoscopy  Risks and possible complications  These include:  · Bleeding               · A puncture or tear in the colon   · Risks of anesthesia  · A cancer lesion not being seen  Getting ready   To prepare for the test:  · Talk with your healthcare provider about the risks of the test (see below). Also ask your healthcare provider about alternatives to the test.  · Tell your healthcare provider about any medicines you take. Also tell him or her about any health conditions you may have.  · Make sure your rectum and colon are empty for the test. Follow the diet and bowel prep instructions exactly. If you dont, the test may need to be rescheduled.  · Plan for a friend or family member to drive you home after the test.     Colonoscopy provides an inside view of the entire colon.     You may discuss the results with your doctor right away or at a future visit.  During the test   The test is usually done in the hospital on an outpatient basis. This means you go home the same day. The procedure takes about 30 minutes. During that time:  · You are given relaxing (sedating) medicine through an IV line. You may be drowsy, or fully asleep.  · The healthcare provider will first give you a physical exam to  check for anal and rectal problems.  · Then the anus is lubricated and the scope inserted.  · If you are awake, you may have a feeling similar to needing to have a bowel movement. You may also feel pressure as air is pumped into the colon. Its OK to pass gas during the procedure.  · Biopsy, polyp removal, or other treatments may be done during the test.  After the test   You may have gas right after the test. It can help to try to pass it to help prevent later bloating. Your healthcare provider may discuss the results with you right away. Or you may need to schedule a follow-up visit to talk about the results. After the test, you can go back to your normal eating and other activities. You may be tired from the sedation and need to rest for a few hours.  Date Last Reviewed: 11/1/2016 © 2000-2017 [employer] [address] [place] [address], [place] [address]. All rights reserved. This information is not intended as a substitute for professional medical care. Always follow your healthcare professional's instructions.

## 2019-01-21 ENCOUNTER — TELEPHONE (OUTPATIENT)
Dept: ENDOSCOPY | Facility: HOSPITAL | Age: 54
End: 2019-01-21

## 2019-01-29 ENCOUNTER — TELEPHONE (OUTPATIENT)
Dept: GASTROENTEROLOGY | Facility: CLINIC | Age: 54
End: 2019-01-29

## 2019-01-29 NOTE — TELEPHONE ENCOUNTER
----- Message from Fritz Franco MD sent at 1/27/2019  5:42 PM CST -----  Nanda your colon polyp was benign and no dysplasia.    SPECIMEN  1) Cecum, 1 mm polyp.  FINAL PATHOLOGIC DIAGNOSIS  1. Cecum polyp (1 mm), biopsy: Lymphoid polyp  [facility]  Diagnosed by: Rosie Drew

## 2019-02-10 ENCOUNTER — PATIENT MESSAGE (OUTPATIENT)
Dept: INTERNAL MEDICINE | Facility: CLINIC | Age: 54
End: 2019-02-10

## 2019-02-10 DIAGNOSIS — Z12.5 PROSTATE CANCER SCREENING: ICD-10-CM

## 2019-02-10 DIAGNOSIS — Z00.00 ANNUAL PHYSICAL EXAM: Primary | ICD-10-CM

## 2019-02-10 DIAGNOSIS — I10 HYPERTENSION, ESSENTIAL: ICD-10-CM

## 2019-02-10 DIAGNOSIS — E78.5 HYPERLIPIDEMIA, UNSPECIFIED HYPERLIPIDEMIA TYPE: ICD-10-CM

## 2019-02-12 RX ORDER — LISINOPRIL 10 MG/1
10 TABLET ORAL DAILY
Qty: 90 TABLET | Refills: 0 | Status: SHIPPED | OUTPATIENT
Start: 2019-02-12 | End: 2019-02-21 | Stop reason: SINTOL

## 2019-02-12 RX ORDER — TAMSULOSIN HYDROCHLORIDE 0.4 MG/1
CAPSULE ORAL
Qty: 90 CAPSULE | Refills: 0 | Status: SHIPPED | OUTPATIENT
Start: 2019-02-12 | End: 2019-02-21

## 2019-02-12 RX ORDER — ATORVASTATIN CALCIUM 20 MG/1
TABLET, FILM COATED ORAL
Qty: 90 TABLET | Refills: 0 | Status: SHIPPED | OUTPATIENT
Start: 2019-02-12 | End: 2019-02-21

## 2019-02-12 NOTE — TELEPHONE ENCOUNTER
----- Message from Jesusmarvin Aylin sent at 2/12/2019  8:18 AM CST -----  Contact: self/[phone]  Type: Rx    Name of medication(s): atorvastatin (LIPITOR) 20 MG tablet, tamsulosin (FLOMAX) 0.4 mg Cap, lisinopril 10 MG tablet    Is this a refill? New rx? Refill     Who prescribed medication? Dr. Weber     Pharm[facility], Phone, & [facility] [address] [place] [address] [address] AT [facility] [address]     Comments: Pt is out of medication and states that pharmacy has been faxing request over. Please advise.          Thank You

## 2019-02-16 ENCOUNTER — LAB VISIT (OUTPATIENT)
Dept: LAB | Facility: HOSPITAL | Age: 54
End: 2019-02-16
Attending: FAMILY MEDICINE
Payer: COMMERCIAL

## 2019-02-16 DIAGNOSIS — Z12.5 PROSTATE CANCER SCREENING: ICD-10-CM

## 2019-02-16 DIAGNOSIS — I10 HYPERTENSION, ESSENTIAL: ICD-10-CM

## 2019-02-16 DIAGNOSIS — E78.5 HYPERLIPIDEMIA, UNSPECIFIED HYPERLIPIDEMIA TYPE: ICD-10-CM

## 2019-02-16 DIAGNOSIS — Z00.00 ANNUAL PHYSICAL EXAM: ICD-10-CM

## 2019-02-16 LAB
ALBUMIN SERPL BCP-MCNC: 3.8 G/DL
ALP SERPL-CCNC: 73 U/L
ALT SERPL W/O P-5'-P-CCNC: 31 U/L
ANION GAP SERPL CALC-SCNC: 7 MMOL/L
AST SERPL-CCNC: 21 U/L
BILIRUB SERPL-MCNC: 0.8 MG/DL
BUN SERPL-MCNC: 14 MG/DL
CALCIUM SERPL-MCNC: 9.3 MG/DL
CHLORIDE SERPL-SCNC: 109 MMOL/L
CHOLEST SERPL-MCNC: 178 MG/DL
CHOLEST/HDLC SERPL: 5.6 {RATIO}
CO2 SERPL-SCNC: 22 MMOL/L
COMPLEXED PSA SERPL-MCNC: 1.7 NG/ML
CREAT SERPL-MCNC: 0.8 MG/DL
EST. GFR  (AFRICAN AMERICAN): >60 ML/MIN/1.73 M^2
EST. GFR  (NON AFRICAN AMERICAN): >60 ML/MIN/1.73 M^2
GLUCOSE SERPL-MCNC: 98 MG/DL
HDLC SERPL-MCNC: 32 MG/DL
HDLC SERPL: 18 %
LDLC SERPL CALC-MCNC: 117.6 MG/DL
NONHDLC SERPL-MCNC: 146 MG/DL
POTASSIUM SERPL-SCNC: 4 MMOL/L
PROT SERPL-MCNC: 6.8 G/DL
SODIUM SERPL-SCNC: 138 MMOL/L
TRIGL SERPL-MCNC: 142 MG/DL

## 2019-02-16 PROCEDURE — 84153 ASSAY OF PSA TOTAL: CPT

## 2019-02-16 PROCEDURE — 80053 COMPREHEN METABOLIC PANEL: CPT

## 2019-02-16 PROCEDURE — 80061 LIPID PANEL: CPT

## 2019-02-16 PROCEDURE — 36415 COLL VENOUS BLD VENIPUNCTURE: CPT

## 2019-02-20 DIAGNOSIS — Z11.59 ENCOUNTER FOR HEPATITIS C SCREENING TEST FOR LOW RISK PATIENT: Primary | ICD-10-CM

## 2019-02-21 ENCOUNTER — OFFICE VISIT (OUTPATIENT)
Dept: INTERNAL MEDICINE | Facility: CLINIC | Age: 54
End: 2019-02-21
Attending: FAMILY MEDICINE
Payer: COMMERCIAL

## 2019-02-21 VITALS
SYSTOLIC BLOOD PRESSURE: 108 MMHG | BODY MASS INDEX: 33.96 KG/M2 | DIASTOLIC BLOOD PRESSURE: 60 MMHG | TEMPERATURE: 98 F | HEIGHT: 65 IN | HEART RATE: 96 BPM | WEIGHT: 203.81 LBS | OXYGEN SATURATION: 95 %

## 2019-02-21 DIAGNOSIS — I10 HYPERTENSION, ESSENTIAL: ICD-10-CM

## 2019-02-21 DIAGNOSIS — Z00.00 ANNUAL PHYSICAL EXAM: Primary | ICD-10-CM

## 2019-02-21 DIAGNOSIS — M54.50 BACK PAIN, LUMBOSACRAL: ICD-10-CM

## 2019-02-21 DIAGNOSIS — E78.5 HYPERLIPIDEMIA, UNSPECIFIED HYPERLIPIDEMIA TYPE: ICD-10-CM

## 2019-02-21 DIAGNOSIS — K21.9 GASTROESOPHAGEAL REFLUX DISEASE, ESOPHAGITIS PRESENCE NOT SPECIFIED: ICD-10-CM

## 2019-02-21 DIAGNOSIS — G47.33 OSA (OBSTRUCTIVE SLEEP APNEA): ICD-10-CM

## 2019-02-21 PROCEDURE — 3074F PR MOST RECENT SYSTOLIC BLOOD PRESSURE < 130 MM HG: ICD-10-PCS | Mod: CPTII,S$GLB,, | Performed by: FAMILY MEDICINE

## 2019-02-21 PROCEDURE — 99396 PREV VISIT EST AGE 40-64: CPT | Mod: S$GLB,,, | Performed by: FAMILY MEDICINE

## 2019-02-21 PROCEDURE — 3078F DIAST BP <80 MM HG: CPT | Mod: CPTII,S$GLB,, | Performed by: FAMILY MEDICINE

## 2019-02-21 PROCEDURE — 99999 PR PBB SHADOW E&M-EST. PATIENT-LVL IV: ICD-10-PCS | Mod: PBBFAC,,, | Performed by: FAMILY MEDICINE

## 2019-02-21 PROCEDURE — 3078F PR MOST RECENT DIASTOLIC BLOOD PRESSURE < 80 MM HG: ICD-10-PCS | Mod: CPTII,S$GLB,, | Performed by: FAMILY MEDICINE

## 2019-02-21 PROCEDURE — 99999 PR PBB SHADOW E&M-EST. PATIENT-LVL IV: CPT | Mod: PBBFAC,,, | Performed by: FAMILY MEDICINE

## 2019-02-21 PROCEDURE — 3074F SYST BP LT 130 MM HG: CPT | Mod: CPTII,S$GLB,, | Performed by: FAMILY MEDICINE

## 2019-02-21 PROCEDURE — 99396 PR PREVENTIVE VISIT,EST,40-64: ICD-10-PCS | Mod: S$GLB,,, | Performed by: FAMILY MEDICINE

## 2019-02-21 RX ORDER — TAMSULOSIN HYDROCHLORIDE 0.4 MG/1
CAPSULE ORAL
Qty: 90 CAPSULE | Refills: 3 | Status: SHIPPED | OUTPATIENT
Start: 2019-02-21 | End: 2019-08-12 | Stop reason: SDUPTHER

## 2019-02-21 RX ORDER — LOSARTAN POTASSIUM 25 MG/1
25 TABLET ORAL DAILY
Qty: 90 TABLET | Refills: 3 | Status: SHIPPED | OUTPATIENT
Start: 2019-02-21 | End: 2019-08-12 | Stop reason: SDUPTHER

## 2019-02-21 RX ORDER — ATORVASTATIN CALCIUM 20 MG/1
TABLET, FILM COATED ORAL
Qty: 90 TABLET | Refills: 3 | Status: SHIPPED | OUTPATIENT
Start: 2019-02-21 | End: 2019-08-12 | Stop reason: ALTCHOICE

## 2019-02-21 NOTE — PATIENT INSTRUCTIONS
[facility]:  [phone]    Information about cholesterol, high blood pressure and healthy diet and activity recommendations can be found at the following links on the Internet:    http://www.nhlbi.nih.gov/health/health-topics/topics/hbc  http://www.nhlbi.nih.gov/health/educational/lose_wt/index.htm  Http://www.nhlbi.nih.gov/files/docs/public/heart/hbp_low.pdf  http://www.heart.org/HEARTORG/  http://diabetes.org/  https://www.cdc.gov/  Https://healthfinder.gov/

## 2019-02-21 NOTE — PROGRESS NOTES
Subjective:       Patient ID: Nanda Colin III is a 53 y.o. male.    Chief Complaint: Medication Refill    Established patient for an annual wellness check/physical exam and also chronic disease management. Specific complaints - see dictation and please see ROS.  P, S, Fm, Soc Hx's; Meds, allergies reviewed and reconciled.  Health maintenance file reviewed and addressed items due.        Review of Systems   Constitutional: Positive for fatigue. Negative for chills, fever and unexpected weight change.   HENT: Negative for congestion and trouble swallowing.    Eyes: Negative for redness and visual disturbance.   Respiratory: Negative for cough, chest tightness and shortness of breath.    Cardiovascular: Negative for chest pain, palpitations and leg swelling.   Gastrointestinal: Negative for abdominal pain and blood in stool.   Genitourinary: Negative for difficulty urinating and hematuria.   Musculoskeletal: Positive for back pain. Negative for arthralgias, gait problem, joint swelling, myalgias and neck pain.   Skin: Negative for color change and rash.   Neurological: Negative for tremors, speech difficulty, weakness, numbness and headaches.   Hematological: Negative for adenopathy. Does not bruise/bleed easily.   Psychiatric/Behavioral: Negative for behavioral problems, confusion and sleep disturbance. The patient is not nervous/anxious.        Objective:      Physical Exam   Constitutional: He is oriented to person, place, and time. He appears well-developed and well-nourished. No distress.   HENT:   Head: Normocephalic.   Right Ear: Tympanic membrane, external ear and ear canal normal.   Left Ear: Tympanic membrane, external ear and ear canal normal.   Nose: Nose normal.   Mouth/Throat: Uvula is midline, oropharynx is clear and moist and mucous membranes are normal. No oropharyngeal exudate.   Eyes: Conjunctivae are normal. Right eye exhibits no discharge. Left eye exhibits no discharge. No scleral icterus.    Neck: Normal range of motion. Neck supple. No thyromegaly present.   Cardiovascular: Normal rate, regular rhythm, normal heart sounds and intact distal pulses. Exam reveals no gallop and no friction rub.   No murmur heard.  Pulmonary/Chest: Effort normal. No respiratory distress. He has no wheezes. He has no rales. He exhibits no tenderness.   Abdominal: Soft. There is no tenderness.   Musculoskeletal: He exhibits no edema.   Lymphadenopathy:     He has no cervical adenopathy.   Neurological: He is alert and oriented to person, place, and time.   Skin: Skin is warm and dry. No rash noted. He is not diaphoretic.   Nursing note and vitals reviewed.      Assessment:       1. Annual physical exam    2. Hypertension, essential    3. Hyperlipidemia, unspecified hyperlipidemia type    4. GINNA (obstructive sleep apnea)    5. Gastroesophageal reflux disease, esophagitis presence not specified    6. Back pain, lumbosacral        Plan:   Nanda was seen today for medication refill.    Diagnoses and all orders for this visit:    Annual physical exam    Hypertension, essential  -     Hypertension Digital Medicine ([facility]) Enrollment Order  -     Hypertension Digital Medicine ([facility]): Assign Onboarding Questionnaires    Hyperlipidemia, unspecified hyperlipidemia type    GINNA (obstructive sleep apnea)  -     Ambulatory consult to Sleep Disorders    Gastroesophageal reflux disease, esophagitis presence not specified    Back pain, lumbosacral  -     Ambulatory Consult to [facility]    Other orders  -     losartan (COZAAR) 25 MG tablet; Take 1 tablet (25 mg total) by mouth once daily.      See meds, orders, follow up, routing and instructions sections of encounter.  A 53-year-old patient for annual physical examination, semi-annual followup.  We   did discuss his laboratory.  He is having chronic back pain.  There are no red   flag symptoms.    He had a negative low-dose smoking CT last April.  He has reduced smoking quite    a bit.  We did discuss diet and exercise.      ANTONIO/HN  dd: 02/21/2019 17:28:56 (CST)  td: 02/22/2019 10:16:02 (CST)  Doc ID   #[id number]  Job ID #[id number]    CC:

## 2019-04-04 ENCOUNTER — OFFICE VISIT (OUTPATIENT)
Dept: SLEEP MEDICINE | Facility: CLINIC | Age: 54
End: 2019-04-04
Attending: FAMILY MEDICINE
Payer: COMMERCIAL

## 2019-04-04 VITALS
WEIGHT: 202.88 LBS | HEART RATE: 79 BPM | HEIGHT: 65 IN | SYSTOLIC BLOOD PRESSURE: 125 MMHG | BODY MASS INDEX: 33.8 KG/M2 | DIASTOLIC BLOOD PRESSURE: 78 MMHG

## 2019-04-04 DIAGNOSIS — G47.30 SLEEP APNEA, UNSPECIFIED TYPE: Primary | ICD-10-CM

## 2019-04-04 PROCEDURE — 99999 PR PBB SHADOW E&M-EST. PATIENT-LVL III: CPT | Mod: PBBFAC,,, | Performed by: NURSE PRACTITIONER

## 2019-04-04 PROCEDURE — 99999 PR PBB SHADOW E&M-EST. PATIENT-LVL III: ICD-10-PCS | Mod: PBBFAC,,, | Performed by: NURSE PRACTITIONER

## 2019-04-04 PROCEDURE — 99204 PR OFFICE/OUTPT VISIT, NEW, LEVL IV, 45-59 MIN: ICD-10-PCS | Mod: S$GLB,,, | Performed by: NURSE PRACTITIONER

## 2019-04-04 PROCEDURE — 3008F BODY MASS INDEX DOCD: CPT | Mod: CPTII,S$GLB,, | Performed by: NURSE PRACTITIONER

## 2019-04-04 PROCEDURE — 3078F PR MOST RECENT DIASTOLIC BLOOD PRESSURE < 80 MM HG: ICD-10-PCS | Mod: CPTII,S$GLB,, | Performed by: NURSE PRACTITIONER

## 2019-04-04 PROCEDURE — 3074F PR MOST RECENT SYSTOLIC BLOOD PRESSURE < 130 MM HG: ICD-10-PCS | Mod: CPTII,S$GLB,, | Performed by: NURSE PRACTITIONER

## 2019-04-04 PROCEDURE — 3008F PR BODY MASS INDEX (BMI) DOCUMENTED: ICD-10-PCS | Mod: CPTII,S$GLB,, | Performed by: NURSE PRACTITIONER

## 2019-04-04 PROCEDURE — 99204 OFFICE O/P NEW MOD 45 MIN: CPT | Mod: S$GLB,,, | Performed by: NURSE PRACTITIONER

## 2019-04-04 PROCEDURE — 3078F DIAST BP <80 MM HG: CPT | Mod: CPTII,S$GLB,, | Performed by: NURSE PRACTITIONER

## 2019-04-04 PROCEDURE — 3074F SYST BP LT 130 MM HG: CPT | Mod: CPTII,S$GLB,, | Performed by: NURSE PRACTITIONER

## 2019-04-04 NOTE — PATIENT INSTRUCTIONS
Obstructive Sleep Apnea  Obstructive sleep apnea is a condition that causes your air passages to become narrowed or blocked during sleep. As a result, breathing stops for short periods. Your body wakes up enough for breathing to begin again, though you don't remember it. The cycle of stopped breathing and brief awakenings can repeat dozens of times a night. This prevents the body from getting to the deeper stages of sleep that are needed for good rest and may cause your body's oxygen level to fall.  Signs of sleep apnea include loud snoring, noisy breathing, and gasping sounds during sleep. Daytime symptoms include waking up tired after a full night's sleep, waking up with headaches, feeling very sleepy or falling asleep during the day, and having problems with memory or concentration.  Risk factors for sleep apnea include:  · Being overweight  · Being a man, or a woman in menopause  · Smoking  · Using alcohol or sedating medicines  · Having enlarged structures in the nose or throat  Home care  Lifestyle changes that can help treat snoring and sleep apnea include the following:  · If you are overweight, lose weight. Talk to your healthcare provider about a weight-loss plan for you.  · Avoid alcohol for 3 to 4 hours before bedtime. Avoid sedating medications. Ask your healthcare provider about the medicines you take.  · If you smoke, talk to your healthcare provider about ways to quit.  · Sleep on your side. This can help prevent gravity from pulling relaxed throat tissues into your breathing passages.  · If you have allergies or sinus problems that block your nose, ask your healthcare provider for help.  Follow-up care  Follow up with your healthcare provider, or as advised. A diagnosis of sleep apnea is made with a sleep study. Your healthcare provider can tell you more about this test.  When to seek medical advice  Sleep apnea can make you more likely to have certain health problems. These include high blood  pressure, heart attack, stroke, and sexual dysfunction. If you have sleep apnea, talk to your healthcare provider about the best treatments for you.  Date Last Reviewed: 4/1/2017  © 0716-7959 [employer]. [address] [place] [address], [place] [address]. All rights reserved. This information is not intended as a substitute for professional medical care. Always follow your healthcare professional's instructions.      Sleep lab [phone] (Raghavendra)

## 2019-04-04 NOTE — PROGRESS NOTES
"Nanda Colin III  was seen as a new patient, referred by Dr. Weber for the evaluation of obstructive sleep apnea.     CHIEF COMPLAINT: Snoring, excessive daytime sleepiness    HISTORY OF PRESENT ILLNESS: Nanda Colin III a 53 y.o. male presents for the evaluationof obstructive sleep apnea. He has never had a sleep study. He has been very tired for the past few years. Sleeps separately from wife due to his snoring. Back sleeper. Likes to catch up/nap/sleep longer when able. Unrefreshing sleep, harder and harder to get out of bed. Rare that sleep is not disrupted, attributes to "52 yo prostate". Questionable witnessed apneic pauses. Denies oral drying throughout night or in am, am headaches or dyspnea. Knows he could lose weight but he is not going to begin formal exercise regimen. Busy at work and likes to eat good [place] food, looks forward to his lunch and fried foods.     Denies symptoms of restless legs or kicking during sleep.     +chronic sinus congestion/alternates sides. Not seen ENT, Flonase NS during recent cold/sinus was ineffective  +tinnitus    EPWORTH SLEEPINESS SCALE 4/4/2019   Sitting and reading 3   Watching TV 1   Sitting, inactive in a public place (e.g. a theatre or a meeting) 2   As a passenger in a car for an hour without a break 1   Lying down to rest in the afternoon when circumstances permit 3   Sitting and talking to someone 1   Sitting quietly after a lunch without alcohol 3   In a car, while stopped for a few minutes in traffic 2   Total score 16     Sleep Clinic New Patient 4/4/2019   What time do you go to bed on a week day? (Give a range) 9-10p   What time do you go to bed on a day off? (Give a range) 9-10p   How long does it take you to fall asleep? (Give a range) 10 to 15 minutes   On average, how many times per night do you wake up? 1-2   How long does it take you to fall back into sleep? (Give a range) Goes right back to sleep   What time do you wake up to start your " "day on a week day? (Give a range) 5:30a   What time do you wake up to start your day on a day off? (Give a range) 7a   What time do you get out of bed? (Give a range) Immediately upon waking   On average, how many hours do you sleep? 7-8   On average, how many naps do you take per day? 0-1   Rate your sleep quality from 0 to 5 (0-poor, 5-great). 4   Have you experienced:  N/a   How much weight have you lost or gained (in lbs.) in the last year? 0   On average, how many times per night do you go to the bathroom?  1-2   Have you ever had a sleep study/CPAP machine/surgery for sleep apnea? No   Have you ever had a CPAP machine for sleep apnea? No   Have you ever had surgery for sleep apnea? No     FAMILY HISTORY: No known sleep disorders. Dad benito    SOCIAL HISTORY: . Quit tobacco. Construction/supervisor    REVIEW OF SYSTEMS:  Sleep related symptoms as per [facility]  Sleep Clinic ROS  4/4/2019   Difficulty breathing through the nose?  Yes   Sore throat or dry mouth in the morning? No   Irregular or very fast heart beat?  No   Shortness of breath?  No   Acid reflux? No   Body aches and pains?  Yes   Morning headaches? No   Dizziness? No   Mood changes?  No   Do you exercise?  No   Do you feel like moving your legs a lot?  No       PHYSICAL EXAM:   /78   Pulse 79   Ht 5' 4.5" (1.638 m)   Wt 92 kg (202 lb 14.4 oz)   BMI 34.29 kg/m²   GENERAL: Obese body habitus, well groomed   HEENT: Conjunctivae are non-erythematous; Pupils equal, round, and reactive to light; Nose is symmetrical; Nasal mucosa is normal; Septum is midline; Inferior turbinates are normal; Nasal airflow is unrestricted; Posterior pharynx is pink; Modified Mallampati: IV; Posterior palate is low; Tonsils 2+; Uvula is long/thick and pink;Tongue is short/thick; small oral cavity. Dentition is fair; No TMJ tenderness; Jaw opening and protrusion without click and without discomfort.   NECK: Supple. Neck circumference is 17.5inches. No thyromegaly. " No palpable nodes.   SKIN: On face and neck: No abrasions, no rashes, no lesions. No subcutaneous nodules are palpable.   RESPIRATORY: Chest is clear to auscultation. Normal chest expansion and non-labored breathing at rest.   CARDIOVASCULAR: Normal S1, S2. No murmurs, gallops or rubs. No carotid bruits bilaterally.   EXTREMITIES: No edema. No clubbing. No cyanosis. Station normal. Gait normal.   NEURO/PSYCH: Oriented to time, place and person. Normal attention span and concentration. Affect is full. Mood is normal.       ASSESSMENT:     Unspecified Sleep Apnea, with symptoms of disruptive snoring, questionable apneic pauses, un-refreshing disrupted sleep and excessive daytime sleepiness, with exam findings of a crowded oral airway, with medical comorbidities of obesity, hypertension. Warrants further investigation for untreated sleep apnea.     PLAN:   1.  Home Sleep Study, discussed plan of care (call)   2. Discussed etiology of GINNA and potential ramifications of untreated GINNA, including stroke, heart disease, HTN.  We discussed potential treatment options, which could include weight loss (10-15%), body positioning, continuous positive airway pressure (CPAP-definitive), mandibular advancement splint by dentist, or referral for surgical consideration.   3. The patient was advised to abstain from driving should he feel sleepy or drowsy.   4. Encouraged weight loss efforts for potential improvement of GINNA and overall health benefits    Thank you for allowing me the opportunity to participate in the care of your patient

## 2019-04-04 NOTE — LETTER
April 4, 2019      Horace Weber MD  [address]  [facility] [address]           [facility]  [address] [facility] [address]  Phone: [phone]  Fax: [phone]          Patient: Nanda Colin III   MR Number: [id number]   YOB: 1965   Date of Visit: 4/4/2019       Dear Dr. Horace Weber:    Thank you for referring Nanda Colin to me for evaluation. Attached you will find relevant portions of my assessment and plan of care.    If you have questions, please do not hesitate to call me. I look forward to following Nanda Colin along with you.    Sincerely,    Kassidy Keys, NP    Enclosure  CC:  No Recipients    If you would like to receive this communication electronically, please contact [email or web address] or [phone] to request more information on [employer] access.    For providers and/or their staff who would like to refer a patient to [facility], please contact us through our one-stop-shop provider referral line, [facility], at [phone].    If you feel you have received this communication in error or would no longer like to receive these types of communications, please e-mail [email or web address]

## 2019-04-16 ENCOUNTER — TELEPHONE (OUTPATIENT)
Dept: SLEEP MEDICINE | Facility: OTHER | Age: 54
End: 2019-04-16

## 2019-05-07 ENCOUNTER — TELEPHONE (OUTPATIENT)
Dept: SLEEP MEDICINE | Facility: OTHER | Age: 54
End: 2019-05-07

## 2019-05-08 ENCOUNTER — HOSPITAL ENCOUNTER (OUTPATIENT)
Dept: SLEEP MEDICINE | Facility: OTHER | Age: 54
Discharge: HOME | End: 2019-05-08
Attending: NURSE PRACTITIONER
Payer: COMMERCIAL

## 2019-05-08 DIAGNOSIS — G47.33 OSA (OBSTRUCTIVE SLEEP APNEA): ICD-10-CM

## 2019-05-08 DIAGNOSIS — G47.30 SLEEP APNEA, UNSPECIFIED TYPE: ICD-10-CM

## 2019-05-08 PROCEDURE — 95800 SLP STDY UNATTENDED: CPT

## 2019-05-22 PROCEDURE — 95800 SLP STDY UNATTENDED: CPT | Mod: 26,,, | Performed by: PSYCHIATRY & NEUROLOGY

## 2019-05-22 PROCEDURE — 95800 PR SLEEP STUDY, UNATTENDED, RECORD HEART RATE/O2 SAT/RESP ANAL/SLEEP TIME: ICD-10-PCS | Mod: 26,,, | Performed by: PSYCHIATRY & NEUROLOGY

## 2019-06-13 ENCOUNTER — TELEPHONE (OUTPATIENT)
Dept: SLEEP MEDICINE | Facility: CLINIC | Age: 54
End: 2019-06-13

## 2019-06-13 NOTE — TELEPHONE ENCOUNTER
----- Message from Chloe Chaudhari sent at 6/12/2019  4:05 PM CDT -----  Contact: BECCA GAGNON III [[id number]]  Name of Who is Calling:BECCA GAGNON III [[id number]]        What is the request in detail: Pt requesting a follow up appointment in June to discuss Sleep Study results and next steps. Please advise-            Can the clinic reply by [facility]: N    What Number to Call Back if not in [facility]: [phone]

## 2019-06-26 ENCOUNTER — PATIENT MESSAGE (OUTPATIENT)
Dept: INTERNAL MEDICINE | Facility: CLINIC | Age: 54
End: 2019-06-26

## 2019-06-26 DIAGNOSIS — Z00.00 ANNUAL PHYSICAL EXAM: Primary | ICD-10-CM

## 2019-06-26 DIAGNOSIS — E78.5 HYPERLIPIDEMIA, UNSPECIFIED HYPERLIPIDEMIA TYPE: ICD-10-CM

## 2019-06-26 DIAGNOSIS — Z12.5 PROSTATE CANCER SCREENING: ICD-10-CM

## 2019-06-26 DIAGNOSIS — I10 HYPERTENSION, ESSENTIAL: ICD-10-CM

## 2019-07-01 NOTE — TELEPHONE ENCOUNTER
No answer - appointment made for 8/5, left message with appt detail and # for PCW to call if rescheduling is needed.

## 2019-07-19 ENCOUNTER — OFFICE VISIT (OUTPATIENT)
Dept: SLEEP MEDICINE | Facility: CLINIC | Age: 54
End: 2019-07-19
Payer: COMMERCIAL

## 2019-07-19 VITALS
DIASTOLIC BLOOD PRESSURE: 93 MMHG | WEIGHT: 197.63 LBS | BODY MASS INDEX: 32.93 KG/M2 | HEART RATE: 87 BPM | SYSTOLIC BLOOD PRESSURE: 145 MMHG | HEIGHT: 65 IN

## 2019-07-19 DIAGNOSIS — G47.33 OSA (OBSTRUCTIVE SLEEP APNEA): Primary | ICD-10-CM

## 2019-07-19 PROCEDURE — 3008F BODY MASS INDEX DOCD: CPT | Mod: CPTII,S$GLB,, | Performed by: NURSE PRACTITIONER

## 2019-07-19 PROCEDURE — 3077F SYST BP >= 140 MM HG: CPT | Mod: CPTII,S$GLB,, | Performed by: NURSE PRACTITIONER

## 2019-07-19 PROCEDURE — 99999 PR PBB SHADOW E&M-EST. PATIENT-LVL III: ICD-10-PCS | Mod: PBBFAC,,, | Performed by: NURSE PRACTITIONER

## 2019-07-19 PROCEDURE — 99999 PR PBB SHADOW E&M-EST. PATIENT-LVL III: CPT | Mod: PBBFAC,,, | Performed by: NURSE PRACTITIONER

## 2019-07-19 PROCEDURE — 3080F DIAST BP >= 90 MM HG: CPT | Mod: CPTII,S$GLB,, | Performed by: NURSE PRACTITIONER

## 2019-07-19 PROCEDURE — 3080F PR MOST RECENT DIASTOLIC BLOOD PRESSURE >= 90 MM HG: ICD-10-PCS | Mod: CPTII,S$GLB,, | Performed by: NURSE PRACTITIONER

## 2019-07-19 PROCEDURE — 99213 OFFICE O/P EST LOW 20 MIN: CPT | Mod: S$GLB,,, | Performed by: NURSE PRACTITIONER

## 2019-07-19 PROCEDURE — 3077F PR MOST RECENT SYSTOLIC BLOOD PRESSURE >= 140 MM HG: ICD-10-PCS | Mod: CPTII,S$GLB,, | Performed by: NURSE PRACTITIONER

## 2019-07-19 PROCEDURE — 99213 PR OFFICE/OUTPT VISIT, EST, LEVL III, 20-29 MIN: ICD-10-PCS | Mod: S$GLB,,, | Performed by: NURSE PRACTITIONER

## 2019-07-19 PROCEDURE — 3008F PR BODY MASS INDEX (BMI) DOCUMENTED: ICD-10-PCS | Mod: CPTII,S$GLB,, | Performed by: NURSE PRACTITIONER

## 2019-07-19 NOTE — PROGRESS NOTES
"Nanda Colin III  was seen as f/u today for mgt of GINNA    He has since undergone a sleep study but didn't get results and was scheduled for appt instead of my calling him which was orignial plan. Still asleep on couch due to disruptive snoring. Pain left ear/can't lie on it happens on ly with lying on ,feels like inc'd pressure. Going to see ENT soon. Ongoing un-refreshing sleep. Can't sleep w/anythign on his face, had hard time with HST device      HISTORY  CHIEF COMPLAINT: Snoring, excessive daytime sleepiness    HISTORY OF PRESENT ILLNESS: Nanda Colin III a 54 y.o. male presents for the evaluationof obstructive sleep apnea. He has never had a sleep study. He has been very tired for the past few years. Sleeps separately from wife due to his snoring. Back sleeper. Likes to catch up/nap/sleep longer when able. Unrefreshing sleep, harder and harder to get out of bed. Rare that sleep is not disrupted, attributes to "54 yo prostate". Questionable witnessed apneic pauses. Denies oral drying throughout night or in am, am headaches or dyspnea. Knows he could lose weight but he is not going to begin formal exercise regimen. Busy at work and likes to eat good [place] food, looks forward to his lunch and fried foods.     Denies symptoms of restless legs or kicking during sleep.     +chronic sinus congestion/alternates sides. Not seen ENT, Flonase NS during recent cold/sinus was ineffective  +tinnitus    EPWORTH SLEEPINESS SCALE 4/4/2019   Sitting and reading 3   Watching TV 1   Sitting, inactive in a public place (e.g. a theatre or a meeting) 2   As a passenger in a car for an hour without a break 1   Lying down to rest in the afternoon when circumstances permit 3   Sitting and talking to someone 1   Sitting quietly after a lunch without alcohol 3   In a car, while stopped for a few minutes in traffic 2   Total score 16     Sleep Clinic New Patient 4/4/2019   What time do you go to bed on a week day? (Give a " "range) 9-10p   What time do you go to bed on a day off? (Give a range) 9-10p   How long does it take you to fall asleep? (Give a range) 10 to 15 minutes   On average, how many times per night do you wake up? 1-2   How long does it take you to fall back into sleep? (Give a range) Goes right back to sleep   What time do you wake up to start your day on a week day? (Give a range) 5:30a   What time do you wake up to start your day on a day off? (Give a range) 7a   What time do you get out of bed? (Give a range) Immediately upon waking   On average, how many hours do you sleep? 7-8   On average, how many naps do you take per day? 0-1   Rate your sleep quality from 0 to 5 (0-poor, 5-great). 4   Have you experienced:  N/a   How much weight have you lost or gained (in lbs.) in the last year? 0   On average, how many times per night do you go to the bathroom?  1-2   Have you ever had a sleep study/CPAP machine/surgery for sleep apnea? No   Have you ever had a CPAP machine for sleep apnea? No   Have you ever had surgery for sleep apnea? No     FAMILY HISTORY: No known sleep disorders. Dad benito  SOCIAL HISTORY: . Quit tobacco. Construction/supervisor    REVIEW OF SYSTEMS: 7/19/19 5# loss.  Sleep related symptoms as per HPI  Difficulty breathing through the nose?  Yes   Sore throat or dry mouth in the morning? No   Irregular or very fast heart beat?  No   Shortness of breath?  No   Acid reflux? No   Body aches and pains?  Yes   Morning headaches? No   Dizziness? No   Mood changes?  No   Do you exercise?  No   Do you feel like moving your legs a lot?  No       PHYSICAL EXAM:   BP (!) 145/93   Pulse 87   Ht 5' 4.5" (1.638 m)   Wt 89.6 kg (197 lb 9.6 oz)   BMI 33.39 kg/m²   GENERAL: Obese body habitus, well groomed     ASSESSMENT:   GINNA, severe supine (no side sleep seen during study)  Left ear pain/fullness, seeing ENT soon  He has medical comorbidities of obesity, hypertension      PLAN:   1.  He will see ENT and " let me know if decides to trial apap option with least invasive mask possible.    2. Discussed etiology of GINNA and potential ramifications of untreated GINNA, including stroke, heart disease, HTN.  We discussed potential treatment options, which could include weight loss (10-15%), body positioning, continuous positive airway pressure (CPAP-definitive), mandibular advancement splint by dentist, INSPIRE or referral for surgical consideration.

## 2019-07-22 ENCOUNTER — OCCUPATIONAL HEALTH (OUTPATIENT)
Dept: URGENT CARE | Facility: CLINIC | Age: 54
End: 2019-07-22

## 2019-07-22 DIAGNOSIS — Z02.83 ENCOUNTER FOR DRUG SCREENING: Primary | ICD-10-CM

## 2019-07-22 PROCEDURE — 80305 OOH NON-DOT DRUG SCREEN: ICD-10-PCS | Mod: S$GLB,,, | Performed by: NURSE PRACTITIONER

## 2019-07-22 PROCEDURE — 80305 DRUG TEST PRSMV DIR OPT OBS: CPT | Mod: S$GLB,,, | Performed by: NURSE PRACTITIONER

## 2019-08-12 ENCOUNTER — OFFICE VISIT (OUTPATIENT)
Dept: INTERNAL MEDICINE | Facility: CLINIC | Age: 54
End: 2019-08-12
Attending: FAMILY MEDICINE
Payer: COMMERCIAL

## 2019-08-12 VITALS
TEMPERATURE: 98 F | OXYGEN SATURATION: 96 % | HEIGHT: 65 IN | DIASTOLIC BLOOD PRESSURE: 74 MMHG | HEART RATE: 83 BPM | SYSTOLIC BLOOD PRESSURE: 116 MMHG | BODY MASS INDEX: 32.99 KG/M2 | WEIGHT: 198 LBS

## 2019-08-12 DIAGNOSIS — I10 HYPERTENSION, ESSENTIAL: Primary | ICD-10-CM

## 2019-08-12 DIAGNOSIS — E78.5 HYPERLIPIDEMIA, UNSPECIFIED HYPERLIPIDEMIA TYPE: ICD-10-CM

## 2019-08-12 PROCEDURE — 3078F DIAST BP <80 MM HG: CPT | Mod: CPTII,S$GLB,, | Performed by: FAMILY MEDICINE

## 2019-08-12 PROCEDURE — 3078F PR MOST RECENT DIASTOLIC BLOOD PRESSURE < 80 MM HG: ICD-10-PCS | Mod: CPTII,S$GLB,, | Performed by: FAMILY MEDICINE

## 2019-08-12 PROCEDURE — 3008F BODY MASS INDEX DOCD: CPT | Mod: CPTII,S$GLB,, | Performed by: FAMILY MEDICINE

## 2019-08-12 PROCEDURE — 99999 PR PBB SHADOW E&M-EST. PATIENT-LVL III: CPT | Mod: PBBFAC,,, | Performed by: FAMILY MEDICINE

## 2019-08-12 PROCEDURE — 3074F PR MOST RECENT SYSTOLIC BLOOD PRESSURE < 130 MM HG: ICD-10-PCS | Mod: CPTII,S$GLB,, | Performed by: FAMILY MEDICINE

## 2019-08-12 PROCEDURE — 99213 PR OFFICE/OUTPT VISIT, EST, LEVL III, 20-29 MIN: ICD-10-PCS | Mod: S$GLB,,, | Performed by: FAMILY MEDICINE

## 2019-08-12 PROCEDURE — 3008F PR BODY MASS INDEX (BMI) DOCUMENTED: ICD-10-PCS | Mod: CPTII,S$GLB,, | Performed by: FAMILY MEDICINE

## 2019-08-12 PROCEDURE — 99999 PR PBB SHADOW E&M-EST. PATIENT-LVL III: ICD-10-PCS | Mod: PBBFAC,,, | Performed by: FAMILY MEDICINE

## 2019-08-12 PROCEDURE — 3074F SYST BP LT 130 MM HG: CPT | Mod: CPTII,S$GLB,, | Performed by: FAMILY MEDICINE

## 2019-08-12 PROCEDURE — 99213 OFFICE O/P EST LOW 20 MIN: CPT | Mod: S$GLB,,, | Performed by: FAMILY MEDICINE

## 2019-08-12 RX ORDER — LOSARTAN POTASSIUM 25 MG/1
25 TABLET ORAL DAILY
Qty: 90 TABLET | Refills: 3 | Status: SHIPPED | OUTPATIENT
Start: 2019-08-12 | End: 2020-07-13 | Stop reason: SDUPTHER

## 2019-08-12 RX ORDER — PRAVASTATIN SODIUM 40 MG/1
40 TABLET ORAL DAILY
Qty: 90 TABLET | Refills: 3 | Status: SHIPPED | OUTPATIENT
Start: 2019-08-12 | End: 2020-07-13 | Stop reason: SDUPTHER

## 2019-08-12 RX ORDER — TAMSULOSIN HYDROCHLORIDE 0.4 MG/1
CAPSULE ORAL
Qty: 90 CAPSULE | Refills: 3 | Status: SHIPPED | OUTPATIENT
Start: 2019-08-12 | End: 2020-07-13 | Stop reason: SDUPTHER

## 2019-08-12 NOTE — PROGRESS NOTES
Subjective:       Patient ID: Nanda Colin III is a 54 y.o. male.    Chief Complaint: Annual Exam    HPI  Review of Systems   Constitutional: Negative for chills, fatigue, fever and unexpected weight change.   HENT: Negative for congestion and trouble swallowing.    Eyes: Negative for redness and visual disturbance.   Respiratory: Negative for cough, chest tightness and shortness of breath.    Cardiovascular: Negative for chest pain, palpitations and leg swelling.   Gastrointestinal: Negative for abdominal pain and blood in stool.   Genitourinary: Negative for difficulty urinating and hematuria.   Musculoskeletal: Positive for myalgias. Negative for arthralgias, back pain, gait problem, joint swelling and neck pain.   Skin: Negative for color change and rash.   Neurological: Negative for tremors, speech difficulty, weakness, numbness and headaches.   Hematological: Negative for adenopathy. Does not bruise/bleed easily.   Psychiatric/Behavioral: Negative for behavioral problems, confusion and sleep disturbance. The patient is not nervous/anxious.        Objective:      Physical Exam   Constitutional: He is oriented to person, place, and time. He appears well-developed and well-nourished.   HENT:   Head: Normocephalic and atraumatic.   Eyes: Conjunctivae are normal. No scleral icterus.   Neck: Normal range of motion. Neck supple. Carotid bruit is not present.   Cardiovascular: Normal rate, regular rhythm, normal heart sounds and intact distal pulses. Exam reveals no gallop and no friction rub.   No murmur heard.  Pulmonary/Chest: Effort normal and breath sounds normal. No respiratory distress. He has no wheezes. He has no rales.   Abdominal: He exhibits no distension. There is no tenderness.   Musculoskeletal: He exhibits no edema or deformity.   Neurological: He is alert and oriented to person, place, and time. He displays no tremor. No cranial nerve deficit. Coordination and gait normal.   Skin: Skin is warm and  dry. No rash noted. He is not diaphoretic. No erythema.   Psychiatric: He has a normal mood and affect. His behavior is normal. Judgment and thought content normal.   Nursing note and vitals reviewed.      Assessment:       1. Hypertension, essential    2. Hyperlipidemia, unspecified hyperlipidemia type        Plan:     Medication List with Changes/Refills   New Medications    PRAVASTATIN (PRAVACHOL) 40 MG TABLET    Take 1 tablet (40 mg total) by mouth once daily.   Current Medications    NICOTINE POLACRILEX (NICORETTE) 4 MG GUM    Take 1 each (4 mg total) by mouth as needed (Maximum 15 pieces/day.). (Generic preferred. Member of [facility] [employer])   Changed and/or Refilled Medications    Modified Medication Previous Medication    LOSARTAN (COZAAR) 25 MG TABLET losartan (COZAAR) 25 MG tablet       Take 1 tablet (25 mg total) by mouth once daily.    Take 1 tablet (25 mg total) by mouth once daily.    TAMSULOSIN (FLOMAX) 0.4 MG CAP tamsulosin (FLOMAX) 0.4 mg Cap       TAKE 1 CAPSULE(0.4 MG) BY MOUTH EVERY DAY    TAKE 1 CAPSULE(0.4 MG) BY MOUTH EVERY DAY   Discontinued Medications    ATORVASTATIN (LIPITOR) 20 MG TABLET    TAKE 1 TABLET(20 MG) BY MOUTH EVERY DAY     Nanda was seen today for annual exam.    Diagnoses and all orders for this visit:    Hypertension, essential  -     Basic metabolic panel; Future    Hyperlipidemia, unspecified hyperlipidemia type    Other orders  -     losartan (COZAAR) 25 MG tablet; Take 1 tablet (25 mg total) by mouth once daily.  -     pravastatin (PRAVACHOL) 40 MG tablet; Take 1 tablet (40 mg total) by mouth once daily.  -     tamsulosin (FLOMAX) 0.4 mg Cap; TAKE 1 CAPSULE(0.4 MG) BY MOUTH EVERY DAY      See meds, orders, follow up, routing and instructions sections of encounter.  A 54-year-old established male patient.  He is in for a semiannual followup.  In   looking back at his last note, this is likely for evaluation of losartan since   we changed him from lisinopril.   He is not taking atorvastatin.  He stated that   he felt that caused leg weakness.  He is willing to try something else and I   suggested pravastatin.    Aníbal weight loss, BMI and ideal body weight, follow up in six months.      ANTONIO/JASS  dd: 08/12/2019 17:22:31 (CDT)  td: 08/13/2019 10:07:45 (CDT)  Doc ID   #[id number]  Job ID #[id number]    CC:

## 2019-08-12 NOTE — PATIENT INSTRUCTIONS
Schedule lab orders for today. BMP      Information about cholesterol, high blood pressure and healthy diet and activity recommendations can be found at the following links on the Internet:    http://www.nhlbi.nih.gov/health/health-topics/topics/hbc  http://www.nhlbi.nih.gov/health/educational/lose_wt/index.htm  Http://www.nhlbi.nih.gov/files/docs/public/heart/hbp_low.pdf  http://www.heart.org/HEARTORG/  http://diabetes.org/  https://www.cdc.gov/  Https://healthfinder.gov/  https://health.gov/dietaryguidelines/2015/guidelines/  https://health.gov/paguidelines/second-edition/pdf/Physical_Activity_Guidelines_2nd_edition.pdf

## 2019-09-12 ENCOUNTER — OFFICE VISIT (OUTPATIENT)
Dept: URGENT CARE | Facility: CLINIC | Age: 54
End: 2019-09-12
Payer: COMMERCIAL

## 2019-09-12 VITALS
TEMPERATURE: 100 F | OXYGEN SATURATION: 97 % | RESPIRATION RATE: 18 BRPM | BODY MASS INDEX: 31.65 KG/M2 | DIASTOLIC BLOOD PRESSURE: 74 MMHG | HEART RATE: 113 BPM | HEIGHT: 65 IN | SYSTOLIC BLOOD PRESSURE: 107 MMHG | WEIGHT: 190 LBS

## 2019-09-12 DIAGNOSIS — N39.0 URINARY TRACT INFECTION WITH HEMATURIA, SITE UNSPECIFIED: Primary | ICD-10-CM

## 2019-09-12 DIAGNOSIS — R31.9 URINARY TRACT INFECTION WITH HEMATURIA, SITE UNSPECIFIED: Primary | ICD-10-CM

## 2019-09-12 LAB
BILIRUB UR QL STRIP: NEGATIVE
GLUCOSE UR QL STRIP: NEGATIVE
KETONES UR QL STRIP: NEGATIVE
LEUKOCYTE ESTERASE UR QL STRIP: POSITIVE
PH, POC UA: 6 (ref 5–8)
POC BLOOD, URINE: POSITIVE
POC NITRATES, URINE: NEGATIVE
PROT UR QL STRIP: POSITIVE
SP GR UR STRIP: 1.02 (ref 1–1.03)
UROBILINOGEN UR STRIP-ACNC: ABNORMAL (ref 0.3–2.2)

## 2019-09-12 PROCEDURE — 87186 SC STD MICRODIL/AGAR DIL: CPT

## 2019-09-12 PROCEDURE — 3078F PR MOST RECENT DIASTOLIC BLOOD PRESSURE < 80 MM HG: ICD-10-PCS | Mod: CPTII,S$GLB,, | Performed by: FAMILY MEDICINE

## 2019-09-12 PROCEDURE — 81003 POCT URINALYSIS, DIPSTICK, AUTOMATED, W/O SCOPE: ICD-10-PCS | Mod: QW,S$GLB,, | Performed by: FAMILY MEDICINE

## 2019-09-12 PROCEDURE — 96372 THER/PROPH/DIAG INJ SC/IM: CPT | Mod: S$GLB,,, | Performed by: FAMILY MEDICINE

## 2019-09-12 PROCEDURE — 3074F PR MOST RECENT SYSTOLIC BLOOD PRESSURE < 130 MM HG: ICD-10-PCS | Mod: CPTII,S$GLB,, | Performed by: FAMILY MEDICINE

## 2019-09-12 PROCEDURE — 3074F SYST BP LT 130 MM HG: CPT | Mod: CPTII,S$GLB,, | Performed by: FAMILY MEDICINE

## 2019-09-12 PROCEDURE — 3008F BODY MASS INDEX DOCD: CPT | Mod: CPTII,S$GLB,, | Performed by: FAMILY MEDICINE

## 2019-09-12 PROCEDURE — 99214 OFFICE O/P EST MOD 30 MIN: CPT | Mod: 25,S$GLB,, | Performed by: FAMILY MEDICINE

## 2019-09-12 PROCEDURE — 87077 CULTURE AEROBIC IDENTIFY: CPT

## 2019-09-12 PROCEDURE — 81003 URINALYSIS AUTO W/O SCOPE: CPT | Mod: QW,S$GLB,, | Performed by: FAMILY MEDICINE

## 2019-09-12 PROCEDURE — 87086 URINE CULTURE/COLONY COUNT: CPT

## 2019-09-12 PROCEDURE — 3008F PR BODY MASS INDEX (BMI) DOCUMENTED: ICD-10-PCS | Mod: CPTII,S$GLB,, | Performed by: FAMILY MEDICINE

## 2019-09-12 PROCEDURE — 3078F DIAST BP <80 MM HG: CPT | Mod: CPTII,S$GLB,, | Performed by: FAMILY MEDICINE

## 2019-09-12 PROCEDURE — 87088 URINE BACTERIA CULTURE: CPT

## 2019-09-12 PROCEDURE — 96372 PR INJECTION,THERAP/PROPH/DIAG2ST, IM OR SUBCUT: ICD-10-PCS | Mod: S$GLB,,, | Performed by: FAMILY MEDICINE

## 2019-09-12 PROCEDURE — 99214 PR OFFICE/OUTPT VISIT, EST, LEVL IV, 30-39 MIN: ICD-10-PCS | Mod: 25,S$GLB,, | Performed by: FAMILY MEDICINE

## 2019-09-12 RX ORDER — CIPROFLOXACIN 500 MG/1
500 TABLET ORAL 2 TIMES DAILY
Qty: 28 TABLET | Refills: 0 | Status: SHIPPED | OUTPATIENT
Start: 2019-09-12 | End: 2019-09-26

## 2019-09-12 RX ORDER — PHENAZOPYRIDINE HYDROCHLORIDE 100 MG/1
100 TABLET, FILM COATED ORAL 3 TIMES DAILY PRN
Qty: 9 TABLET | Refills: 0 | Status: SHIPPED | OUTPATIENT
Start: 2019-09-12 | End: 2019-09-15

## 2019-09-12 RX ORDER — CEFTRIAXONE 1 G/1
1 INJECTION, POWDER, FOR SOLUTION INTRAMUSCULAR; INTRAVENOUS
Status: COMPLETED | OUTPATIENT
Start: 2019-09-12 | End: 2019-09-12

## 2019-09-12 RX ADMIN — CEFTRIAXONE 1 G: 1 INJECTION, POWDER, FOR SOLUTION INTRAMUSCULAR; INTRAVENOUS at 07:09

## 2019-09-12 NOTE — PATIENT INSTRUCTIONS
Urinary Tract Infections in Men    Urinary tract infections (UTIs) are most often caused by bacteria that invade the urinary tract. The bacteria may come from outside the body. Or they may travel from the skin outside of rectum into the urethra. Pain in or around the urinary tract is a common symptom for most UTIs. But the only way to know for sure if you have a UTI is to have a urinalysis and urine culture.   Types of UTIs  · Cystitis: This is a bladder infection and is often linked to a blockage from an enlarged prostate. You may have an urgent or frequent need to urinate, and bloody urine. Treatment includes antibiotics and medicine to relax or shrink the prostate. Sometimes, surgery is needed.  · Urethritis: This is an infection of the urethra. You may have a discharge from the urethra or burning when you urinate. You may also have pain in the urethra or penis. It is treated with antibiotics.  · Prostatitis: This is an inflammation or infection of the prostate. You may have an urgent or frequent need to urinate, fever, or burning when you urinate. Or you may have a tender prostate, or a vague feeling of pressure. Prostatitis is treated with a range of medicines, depending on the cause.  · Pyelonephritis: This is a kidney infection. If not treated, it can be serious and damage your kidneys. In severe cases you may be hospitalized. You may have a fever and upper back pain.  Treating a UTI  · Medicine: Most UTIs are treated with antibiotics. These kill the bacteria. The length of time you need to take them depends on the type of infection. Take antibiotics exactly as directed until all of the medicine is gone. If you don't, the infection may not go away and may become harder to treat. For certain types of UTIs, you may be given other medicine to help treat your symptoms.  · Lifestyle changes: The lifestyle changes below will help get rid of your current infection. They may also help prevent future UTIs.  ¨ Drink  plenty of fluids such as water, juice, or other caffeine-free drinks. This helps flush bacteria out of your system.  ¨ Empty your bladder when you feel the urge to urinate and before going to sleep. Urine that stays in your bladder promotes infection.  ¨ Use condoms during sex. These help prevent UTIs caused by sexually transmitted bacteria.  ¨ Keep follow-up appointments with your healthcare provider. He or she can may do tests to make sure the infection has cleared. If needed, more treatment can be started.  · Other treatment: Most UTIs respond to medicine. But sometimes a procedure or surgery is needed. This can treat an enlarged prostate, or remove a kidney stone or other blockage. Surgery may also treat problems caused by scarring or long-term infections.  Date Last Reviewed: 1/1/2017  © 9476-0104 [employer]. [address] [place] [address], [place] [address]. All rights reserved. This information is not intended as a substitute for professional medical care. Always follow your healthcare professional's instructions.

## 2019-09-12 NOTE — PROGRESS NOTES
"Subjective:       Patient ID: Nanda Colin III is a 54 y.o. male.    Vitals:  height is 5' 5" (1.651 m) and weight is 86.2 kg (190 lb). His oral temperature is 100.4 °F (38 °C) (abnormal). His blood pressure is 107/74 and his pulse is 113 (abnormal). His respiration is 18 and oxygen saturation is 97%.     Chief Complaint: Hematuria    This is a 54 y.o. male who presents today with a chief complaint of hematuria since 2:00 pm today. He has urgency. He "lives with back pain". Subjective fever. Drinking water after stopping soda. No medications.    Hematuria   This is a new problem. The current episode started today. The problem has been gradually worsening since onset. He describes the hematuria as gross hematuria. The hematuria occurs during the terminal portion of his urinary stream. He reports clotting at the end of his urine stream. His pain is at a severity of 9/10. The pain is severe. He describes his urine color as light pink. Irritative symptoms include frequency and urgency. Associated symptoms include fever. Pertinent negatives include no abdominal pain, dysuria, flank pain, hesitancy, nausea or vomiting. He is sexually active. His past medical history is significant for hypertension and tobacco use. There is no history of kidney stones, STDs or UTI.       Constitution: Positive for fatigue and fever. Negative for generalized weakness.   HENT: Negative for ear pain.    Neck: Negative for painful lymph nodes.   Cardiovascular: Negative for chest pain.   Respiratory: Negative for cough.    Gastrointestinal: Negative for abdominal pain, nausea and vomiting.   Genitourinary: Positive for frequency, urgency and hematuria. Negative for dysuria, urine decreased, flank pain, history of kidney stones, genital trauma, painful intercourse, genital sore, penile discharge, painful ejaculation, penile pain, penile swelling, scrotal swelling and testicular pain.   Musculoskeletal: Negative for back pain.   Skin: " Negative for rash and lesion.   Allergic/Immunologic: Negative for seasonal allergies.   Neurological: Negative for dizziness, light-headedness and altered mental status.   Hematologic/Lymphatic: Negative for swollen lymph nodes.   Psychiatric/Behavioral: Negative for altered mental status and confusion.       Objective:      Physical Exam   Constitutional: He appears well-developed and well-nourished.   HENT:   Head: Normocephalic and atraumatic.   Eyes: Pupils are equal, round, and reactive to light. EOM are normal.   Neck: Normal range of motion. Neck supple.   Cardiovascular: Normal rate, regular rhythm and normal heart sounds.   Pulmonary/Chest: Effort normal and breath sounds normal.   Abdominal: Soft. There is no tenderness (No CVAT).   Nursing note and vitals reviewed.      Assessment:       1. Urinary tract infection with hematuria, site unspecified        Plan:         Urinary tract infection with hematuria, site unspecified  -     Culture, Urine  -     POCT Urinalysis, Dipstick, Automated, W/O Scope  -     cefTRIAXone injection 1 g  -     ciprofloxacin HCl (CIPRO) 500 MG tablet; Take 1 tablet (500 mg total) by mouth 2 (two) times daily. for 14 days  Dispense: 28 tablet; Refill: 0  -     phenazopyridine (PYRIDIUM) 100 MG tablet; Take 1 tablet (100 mg total) by mouth 3 (three) times daily as needed for Pain.  Dispense: 9 tablet; Refill: 0    follow up with Urologist tomorrow

## 2019-09-16 ENCOUNTER — OFFICE VISIT (OUTPATIENT)
Dept: UROLOGY | Facility: CLINIC | Age: 54
End: 2019-09-16
Payer: COMMERCIAL

## 2019-09-16 ENCOUNTER — TELEPHONE (OUTPATIENT)
Dept: UROLOGY | Facility: CLINIC | Age: 54
End: 2019-09-16

## 2019-09-16 VITALS — HEIGHT: 65 IN | BODY MASS INDEX: 32.65 KG/M2 | WEIGHT: 196 LBS

## 2019-09-16 DIAGNOSIS — R31.0 GROSS HEMATURIA: Primary | ICD-10-CM

## 2019-09-16 DIAGNOSIS — R31.0 GROSS HEMATURIA: ICD-10-CM

## 2019-09-16 LAB — BACTERIA UR CULT: ABNORMAL

## 2019-09-16 PROCEDURE — 99214 OFFICE O/P EST MOD 30 MIN: CPT | Mod: S$GLB,,, | Performed by: UROLOGY

## 2019-09-16 PROCEDURE — 99999 PR PBB SHADOW E&M-EST. PATIENT-LVL II: CPT | Mod: PBBFAC,,, | Performed by: UROLOGY

## 2019-09-16 PROCEDURE — 3008F BODY MASS INDEX DOCD: CPT | Mod: CPTII,S$GLB,, | Performed by: UROLOGY

## 2019-09-16 PROCEDURE — 3008F PR BODY MASS INDEX (BMI) DOCUMENTED: ICD-10-PCS | Mod: CPTII,S$GLB,, | Performed by: UROLOGY

## 2019-09-16 PROCEDURE — 99999 PR PBB SHADOW E&M-EST. PATIENT-LVL II: ICD-10-PCS | Mod: PBBFAC,,, | Performed by: UROLOGY

## 2019-09-16 PROCEDURE — 99214 PR OFFICE/OUTPT VISIT, EST, LEVL IV, 30-39 MIN: ICD-10-PCS | Mod: S$GLB,,, | Performed by: UROLOGY

## 2019-09-16 NOTE — PROGRESS NOTES
[facility]      Hematuria New Note    9/16/2019    Referred by:  Horace Weber MD    HPI: Nanda Colin III is a very pleasant 54 y.o. male who is a new patient to our department referred for evaluation of history of gross hematuria. He reports symptoms of irritative voiding including dysuria and frequency. He denies symptoms of obstructive voiding including decreased stream, hesitancy, intermittency, post void dribbling and sense of incomplete emptying.   His history includes no notation of urolithiasis, hematuria, prior pelvic surgery, previous prolapse or incontinence procedures or neurological symptoms/diagnoses. He reports no urinary incontinence.     He reports no risk factors including tobacco exposure, family history of urologic malignancy, or industrial exposures.      Prior Evaluations:   No previous upper urinary tract evaluation   No previous lower urinary tract evaluation    A review of 10+ systems was conducted with pertinent positive and negative findings documented in HPI with all other systems reviewed and negative.    Past medical, family, surgical and social history reviewed as documented in chart with pertinent positive medical, family, surgical and social history detailed in HPI.    Exam Findings:    Const: no acute distress, conversant and alert  Eyes: anicteric, extraocular muscles intact  ENMT: normocephalic, Nl oral membranes  Cardio: no cyanosis, nl cap refill  Pulm: no tachypnea; no resp distress  Abd: soft, no tenderness  Musc: no laceration, no tenderness  Neuro: alert; oriented x 3  Skin: warm, dry; no petichiae  Psych: no anxiety; normal speech       Assessment/Plan:    Gross Hematuria:  (new, addt'l workup):  We discussed evaluation of his hematuria, including upper and lower urinary tract studies.     1. Scheduled CT Urogram for evaluation of upper urinary tracts.  2. Cystoscopy scheduled in OR under sedation          Clinical tests reviewed/ordered today:  urinalysis (09/16/2019)   Radiology ordered/reviewed: CT Urogram   Medical tests ordered/reviewed: none   Radiology independently reviewed: none   Previous records: reviewed today and showing, in summary - Patient seen for hematuria treated with ciprofloxacin. No urine culture for review. No imaging for reviw.

## 2019-09-16 NOTE — LETTER
September 16, 2019      Horace Weber MD  [address]  [place] [address]           [facility] - Urology 4th Floor  [address]  [facility] [address]  Phone: [phone]          Patient: Nanda Colin III   MR Number: [id number]   YOB: 1965   Date of Visit: 9/16/2019       Dear Dr. Horace Weber:    Thank you for referring Nanda Colin to me for evaluation. Attached you will find relevant portions of my assessment and plan of care.    If you have questions, please do not hesitate to call me. I look forward to following Nanda Colin along with you.    Sincerely,    Angel Farris MD    Enclosure  CC:  No Recipients    If you would like to receive this communication electronically, please contact [email or web address] or [phone] to request more information on [facility] access.    For providers and/or their staff who would like to refer a patient to [facility], please contact us through our one-stop-shop provider referral line, [facility], at [phone].    If you feel you have received this communication in error or would no longer like to receive these types of communications, please e-mail [email or web address]

## 2019-09-17 ENCOUNTER — TELEPHONE (OUTPATIENT)
Dept: URGENT CARE | Facility: CLINIC | Age: 54
End: 2019-09-17

## 2019-09-17 ENCOUNTER — HOSPITAL ENCOUNTER (OUTPATIENT)
Dept: RADIOLOGY | Facility: HOSPITAL | Age: 54
Discharge: HOME | End: 2019-09-17
Attending: UROLOGY
Payer: COMMERCIAL

## 2019-09-17 DIAGNOSIS — N40.1 BPH WITH URINARY OBSTRUCTION: Primary | ICD-10-CM

## 2019-09-17 DIAGNOSIS — R31.0 GROSS HEMATURIA: ICD-10-CM

## 2019-09-17 DIAGNOSIS — N13.8 BPH WITH URINARY OBSTRUCTION: Primary | ICD-10-CM

## 2019-09-17 PROCEDURE — 25500020 PHARM REV CODE 255: Performed by: UROLOGY

## 2019-09-17 PROCEDURE — 74178 CT UROGRAM ABD PELVIS W WO: ICD-10-PCS | Mod: 26,,, | Performed by: RADIOLOGY

## 2019-09-17 PROCEDURE — 74178 CT ABD&PLV WO CNTR FLWD CNTR: CPT | Mod: TC

## 2019-09-17 PROCEDURE — 74178 CT ABD&PLV WO CNTR FLWD CNTR: CPT | Mod: 26,,, | Performed by: RADIOLOGY

## 2019-09-17 RX ADMIN — IOHEXOL 125 ML: 350 INJECTION, SOLUTION INTRAVENOUS at 06:09

## 2019-09-17 NOTE — TELEPHONE ENCOUNTER
----- Message from Titi Tavares MD sent at 9/17/2019  9:53 AM CDT -----  Please call the patient regarding his abnormal result and that the antibiotics should eradicate the infection. Need to document improvement.

## 2019-09-17 NOTE — TELEPHONE ENCOUNTER
Spoke to pt and reviewed abnormal results and pt was treated. Pt stated he is feeling much butter and he completed the medicine. Pt v/u

## 2019-09-18 LAB
CREAT SERPL-MCNC: 1 MG/DL (ref 0.5–1.4)
SAMPLE: NORMAL

## 2019-09-30 DIAGNOSIS — Z01.818 PREOP TESTING: Primary | ICD-10-CM

## 2019-10-08 ENCOUNTER — TELEPHONE (OUTPATIENT)
Dept: UROLOGY | Facility: CLINIC | Age: 54
End: 2019-10-08

## 2019-11-10 ENCOUNTER — OFFICE VISIT (OUTPATIENT)
Dept: URGENT CARE | Facility: CLINIC | Age: 54
End: 2019-11-10
Payer: COMMERCIAL

## 2019-11-10 VITALS
SYSTOLIC BLOOD PRESSURE: 136 MMHG | BODY MASS INDEX: 32.49 KG/M2 | HEIGHT: 65 IN | HEART RATE: 93 BPM | TEMPERATURE: 98 F | DIASTOLIC BLOOD PRESSURE: 87 MMHG | OXYGEN SATURATION: 97 % | WEIGHT: 195 LBS | RESPIRATION RATE: 18 BRPM

## 2019-11-10 DIAGNOSIS — R09.82 POST-NASAL DRIP: ICD-10-CM

## 2019-11-10 DIAGNOSIS — J06.9 UPPER RESPIRATORY INFECTION WITH COUGH AND CONGESTION: Primary | ICD-10-CM

## 2019-11-10 PROCEDURE — 3075F PR MOST RECENT SYSTOLIC BLOOD PRESS GE 130-139MM HG: ICD-10-PCS | Mod: CPTII,S$GLB,, | Performed by: NURSE PRACTITIONER

## 2019-11-10 PROCEDURE — 99213 OFFICE O/P EST LOW 20 MIN: CPT | Mod: S$GLB,,, | Performed by: NURSE PRACTITIONER

## 2019-11-10 PROCEDURE — 3079F PR MOST RECENT DIASTOLIC BLOOD PRESSURE 80-89 MM HG: ICD-10-PCS | Mod: CPTII,S$GLB,, | Performed by: NURSE PRACTITIONER

## 2019-11-10 PROCEDURE — 3008F BODY MASS INDEX DOCD: CPT | Mod: CPTII,S$GLB,, | Performed by: NURSE PRACTITIONER

## 2019-11-10 PROCEDURE — 3008F PR BODY MASS INDEX (BMI) DOCUMENTED: ICD-10-PCS | Mod: CPTII,S$GLB,, | Performed by: NURSE PRACTITIONER

## 2019-11-10 PROCEDURE — 3079F DIAST BP 80-89 MM HG: CPT | Mod: CPTII,S$GLB,, | Performed by: NURSE PRACTITIONER

## 2019-11-10 PROCEDURE — 3075F SYST BP GE 130 - 139MM HG: CPT | Mod: CPTII,S$GLB,, | Performed by: NURSE PRACTITIONER

## 2019-11-10 PROCEDURE — 99213 PR OFFICE/OUTPT VISIT, EST, LEVL III, 20-29 MIN: ICD-10-PCS | Mod: S$GLB,,, | Performed by: NURSE PRACTITIONER

## 2019-11-10 RX ORDER — FLUTICASONE PROPIONATE 50 MCG
1 SPRAY, SUSPENSION (ML) NASAL DAILY
Qty: 1 BOTTLE | Refills: 0 | Status: SHIPPED | OUTPATIENT
Start: 2019-11-10 | End: 2022-02-14

## 2019-11-10 NOTE — PROGRESS NOTES
"Subjective:       Patient ID: Nanda Colin III is a 54 y.o. male.    Vitals:  height is 5' 5" (1.651 m) and weight is 88.5 kg (195 lb). His tympanic temperature is 98.4 °F (36.9 °C). His blood pressure is 136/87 and his pulse is 93. His respiration is 18 and oxygen saturation is 97%.     Chief Complaint: Sinus Problem    This is a 54 y.o. male who presents today with a chief complaint of sinus pressure with drainage since yesterday midday. Took Walflu last night and today without relief. It contains an antihistamine, acetaminophen and decongestant.    Sinus Problem   This is a new problem. The current episode started yesterday. The problem has been gradually worsening since onset. There has been no fever. His pain is at a severity of 8/10 (Pressure, No pain). Associated symptoms include congestion, coughing, sinus pressure and sneezing. Pertinent negatives include no ear pain, headaches, hoarse voice, neck pain, shortness of breath, sore throat or swollen glands. The treatment provided no relief.       Constitution: Negative for activity change, appetite change, fever, generalized weakness and international travel in last 60 days.   HENT: Positive for congestion, postnasal drip and sinus pressure. Negative for ear pain, sinus pain and sore throat.    Neck: Negative for neck pain.   Cardiovascular: Negative for chest pain and sob on exertion.   Eyes: Negative for eye trauma.   Respiratory: Positive for cough. Negative for sputum production, shortness of breath and asthma.    Gastrointestinal: Negative for abdominal trauma and abdominal pain.   Genitourinary: Negative for dysuria.   Musculoskeletal: Negative for pain and trauma.   Skin: Negative for wound.   Allergic/Immunologic: Positive for sneezing and immunizations up-to-date. Negative for seasonal allergies, asthma and flu shot.   Neurological: Negative for dizziness, light-headedness, headaches and altered mental status.   Psychiatric/Behavioral: Negative " for altered mental status and confusion.       Objective:      Physical Exam   Constitutional: He is oriented to person, place, and time. He appears well-developed and well-nourished. He is cooperative.  Non-toxic appearance. He does not have a sickly appearance. He does not appear ill. No distress.   HENT:   Head: Normocephalic and atraumatic.   Right Ear: Hearing, tympanic membrane, external ear and ear canal normal.   Left Ear: Hearing, tympanic membrane, external ear and ear canal normal.   Nose: Mucosal edema present. No rhinorrhea or nasal deformity. No epistaxis. Right sinus exhibits no maxillary sinus tenderness and no frontal sinus tenderness. Left sinus exhibits no maxillary sinus tenderness and no frontal sinus tenderness.   Mouth/Throat: Uvula is midline and mucous membranes are normal. No trismus in the jaw. Normal dentition. No uvula swelling. Posterior oropharyngeal erythema present. No oropharyngeal exudate or posterior oropharyngeal edema.   Eyes: Conjunctivae and lids are normal. No scleral icterus.   Neck: Trachea normal, full passive range of motion without pain and phonation normal. Neck supple. No neck rigidity. No edema and no erythema present.   Cardiovascular: Normal rate, regular rhythm, normal heart sounds, intact distal pulses and normal pulses.   Pulmonary/Chest: Effort normal and breath sounds normal. No respiratory distress. He has no decreased breath sounds. He has no rhonchi.   Abdominal: Normal appearance.   Musculoskeletal: Normal range of motion. He exhibits no edema or deformity.   Neurological: He is alert and oriented to person, place, and time. He exhibits normal muscle tone. Coordination normal.   Skin: Skin is warm, dry, intact, not diaphoretic, not pale and no rash.   Psychiatric: He has a normal mood and affect. His speech is normal and behavior is normal. Judgment and thought content normal. Cognition and memory are normal.   Nursing note and vitals reviewed.         Assessment:       1. Upper respiratory infection with cough and congestion    2. Post-nasal drip        Plan:         Upper respiratory infection with cough and congestion  -     fluticasone propionate (FLONASE) 50 mcg/actuation nasal spray; 1 spray (50 mcg total) by Each Nostril route once daily.  Dispense: 1 Bottle; Refill: 0    Post-nasal drip  -     fluticasone propionate (FLONASE) 50 mcg/actuation nasal spray; 1 spray (50 mcg total) by Each Nostril route once daily.  Dispense: 1 Bottle; Refill: 0

## 2019-11-10 NOTE — PATIENT INSTRUCTIONS
Return to Urgent Care or go to ER if symptoms worsen or fail to improve.  Follow up with PCP as recommended for further management.     Over the counter medications that are also available by prescription (depending on insurance coverage):    Use Flonase or Nasacort (script sent for flonase ) 1-2 sprays/nostril per day. It is a local acting steroid nasal spray, if you develop a bloody nose, stop using the medication immediately.    Over the counter medications that may be helpful:    Use Afrin in each nare for no longer than 5 days, as it is addictive. It can also dry out your mucous membranes and cause elevated blood pressure.    Use pseudoephedrine (behind the counter) to decongest-- (the little red pills).  Pseudoephedrine 30 mg up to 240 mg /day. It can raise your blood pressure and give you palpitations.  Do not buy any oral medications with phenylephrine as it has not been proven effective as a decongestant at the OTC dose.     Take Ibuprofen or Acetaminophen according to label instructions for fever, throat pain, headache, and body aches    Use warm salt water gargles to ease your throat pain. Warm salt water gargles as needed for sore throat-  1/2 tsp salt to 1 cup warm water, gargle as desired.    Sometimes Nyquil at night is beneficial to help you get some rest, however it is sedating and does have an antihistamine, as well as tylenol.  The Nyquil behind the pharmacy counter also has the decongestant, pseudoephedrine as an additional ingredient.     Viral Upper Respiratory Illness (Adult)  You have a viral upper respiratory illness (URI), which is another term for the common cold. This illness is contagious during the first few days. It is spread through the air by coughing and sneezing. It may also be spread by direct contact (touching the sick person and then touching your own eyes, nose, or mouth). Frequent handwashing will decrease risk of spread. Most viral illnesses go away within 7 to 10 days with  rest and simple home remedies. Sometimes the illness may last for several weeks. Antibiotics will not kill a virus, and they are generally not prescribed for this condition.    Home care  · If symptoms are severe, rest at home for the first 2 to 3 days. When you resume activity, don't let yourself get too tired.  · Avoid being exposed to cigarette smoke (yours or others).  · You may use acetaminophen or ibuprofen to control pain and fever, unless another medicine was prescribed. (Note: If you have chronic liver or kidney disease, have ever had a stomach ulcer or gastrointestinal bleeding, or are taking blood-thinning medicines, talk with your healthcare provider before using these medicines.) Aspirin should never be given to anyone under 18 years of age who is ill with a viral infection or fever. It may cause severe liver or brain damage.  · Your appetite may be poor, so a light diet is fine. Avoid dehydration by drinking 6 to 8 glasses of fluids per day (water, soft drinks, juices, tea, or soup). Extra fluids will help loosen secretions in the nose and lungs.  · Over-the-counter cold medicines will not shorten the length of time youre sick, but they may be helpful for the following symptoms: cough, sore throat, and nasal and sinus congestion. (Note: Do not use decongestants if you have high blood pressure.)  Follow-up care  Follow up with your healthcare provider, or as advised.  When to seek medical advice  Call your healthcare provider right away if any of these occur:  · Cough with lots of colored sputum (mucus)  · Severe headache; face, neck, or ear pain  · Difficulty swallowing due to throat pain  · Fever of 100.4°F (38°C)  Call 911, or get immediate medical care  Call emergency services right away if any of these occur:  · Chest pain, shortness of breath, wheezing, or difficulty breathing  · Coughing up blood  · Inability to swallow due to throat pain  Date Last Reviewed: 9/13/2015  © 4992-9779 [employer]. [address] [place] [address], [place] [address]. All rights reserved. This information is not intended as a substitute for professional medical care. Always follow your healthcare professional's instructions.

## 2020-07-13 ENCOUNTER — TELEPHONE (OUTPATIENT)
Dept: INTERNAL MEDICINE | Facility: CLINIC | Age: 55
End: 2020-07-13

## 2020-07-13 ENCOUNTER — OFFICE VISIT (OUTPATIENT)
Dept: INTERNAL MEDICINE | Facility: CLINIC | Age: 55
End: 2020-07-13
Attending: FAMILY MEDICINE
Payer: COMMERCIAL

## 2020-07-13 VITALS
OXYGEN SATURATION: 96 % | HEIGHT: 65 IN | SYSTOLIC BLOOD PRESSURE: 120 MMHG | WEIGHT: 196.88 LBS | DIASTOLIC BLOOD PRESSURE: 78 MMHG | BODY MASS INDEX: 32.8 KG/M2 | HEART RATE: 94 BPM

## 2020-07-13 DIAGNOSIS — Z00.00 ANNUAL PHYSICAL EXAM: Primary | ICD-10-CM

## 2020-07-13 DIAGNOSIS — E78.5 HYPERLIPIDEMIA, UNSPECIFIED HYPERLIPIDEMIA TYPE: ICD-10-CM

## 2020-07-13 DIAGNOSIS — N13.8 BPH WITH URINARY OBSTRUCTION: ICD-10-CM

## 2020-07-13 DIAGNOSIS — Z12.5 PROSTATE CANCER SCREENING: ICD-10-CM

## 2020-07-13 DIAGNOSIS — Z12.2 ENCOUNTER FOR SCREENING FOR MALIGNANT NEOPLASM OF RESPIRATORY ORGANS: ICD-10-CM

## 2020-07-13 DIAGNOSIS — I10 HYPERTENSION, ESSENTIAL: ICD-10-CM

## 2020-07-13 DIAGNOSIS — Z87.891 PERSONAL HISTORY OF NICOTINE DEPENDENCE: ICD-10-CM

## 2020-07-13 DIAGNOSIS — N40.1 BPH WITH URINARY OBSTRUCTION: ICD-10-CM

## 2020-07-13 DIAGNOSIS — R00.0 TACHYCARDIA: ICD-10-CM

## 2020-07-13 PROCEDURE — 99999 PR PBB SHADOW E&M-EST. PATIENT-LVL IV: CPT | Mod: PBBFAC,,, | Performed by: FAMILY MEDICINE

## 2020-07-13 PROCEDURE — 3078F PR MOST RECENT DIASTOLIC BLOOD PRESSURE < 80 MM HG: ICD-10-PCS | Mod: CPTII,S$GLB,, | Performed by: FAMILY MEDICINE

## 2020-07-13 PROCEDURE — 3074F SYST BP LT 130 MM HG: CPT | Mod: CPTII,S$GLB,, | Performed by: FAMILY MEDICINE

## 2020-07-13 PROCEDURE — 3074F PR MOST RECENT SYSTOLIC BLOOD PRESSURE < 130 MM HG: ICD-10-PCS | Mod: CPTII,S$GLB,, | Performed by: FAMILY MEDICINE

## 2020-07-13 PROCEDURE — 3008F BODY MASS INDEX DOCD: CPT | Mod: CPTII,S$GLB,, | Performed by: FAMILY MEDICINE

## 2020-07-13 PROCEDURE — 99999 PR PBB SHADOW E&M-EST. PATIENT-LVL IV: ICD-10-PCS | Mod: PBBFAC,,, | Performed by: FAMILY MEDICINE

## 2020-07-13 PROCEDURE — 3008F PR BODY MASS INDEX (BMI) DOCUMENTED: ICD-10-PCS | Mod: CPTII,S$GLB,, | Performed by: FAMILY MEDICINE

## 2020-07-13 PROCEDURE — 3078F DIAST BP <80 MM HG: CPT | Mod: CPTII,S$GLB,, | Performed by: FAMILY MEDICINE

## 2020-07-13 PROCEDURE — 99396 PR PREVENTIVE VISIT,EST,40-64: ICD-10-PCS | Mod: S$GLB,,, | Performed by: FAMILY MEDICINE

## 2020-07-13 PROCEDURE — 99396 PREV VISIT EST AGE 40-64: CPT | Mod: S$GLB,,, | Performed by: FAMILY MEDICINE

## 2020-07-13 RX ORDER — PRAVASTATIN SODIUM 40 MG/1
40 TABLET ORAL DAILY
Qty: 90 TABLET | Refills: 3 | Status: SHIPPED | OUTPATIENT
Start: 2020-07-13 | End: 2020-08-25

## 2020-07-13 RX ORDER — TAMSULOSIN HYDROCHLORIDE 0.4 MG/1
CAPSULE ORAL
Qty: 90 CAPSULE | Refills: 3 | Status: SHIPPED | OUTPATIENT
Start: 2020-07-13 | End: 2020-08-25

## 2020-07-13 RX ORDER — LOSARTAN POTASSIUM 25 MG/1
25 TABLET ORAL DAILY
Qty: 90 TABLET | Refills: 3 | Status: SHIPPED | OUTPATIENT
Start: 2020-07-13 | End: 2020-08-25

## 2020-07-13 NOTE — PROGRESS NOTES
Subjective:       Patient ID: Nanda Colin III is a 55 y.o. male.    Chief Complaint: Follow-up (rapid pulse)    Established patient for an annual wellness check/physical exam and also chronic disease management. Specific complaints - see dictation and please see ROS.  P, S, Fm, Soc Hx's; Meds, allergies reviewed and reconciled.  Health maintenance file reviewed and addressed items due.    Asymptomatic tachycardia to 105 when attempting to donate blood last week. Does smoke, had cigarette prior.    No previous symptomatic tachycardia or noted episodes.  No current cardiopulmonary complaints such as chest pain, dyspnea, syncope, near syncope, etc..    Patient is due for his annual physical examination today so will code visit performed that as well.    Review of Systems   Constitutional: Negative for chills, fatigue, fever and unexpected weight change.   HENT: Negative for congestion and trouble swallowing.    Eyes: Negative for redness and visual disturbance.   Respiratory: Negative for cough, chest tightness and shortness of breath.    Cardiovascular: Negative for chest pain, palpitations and leg swelling.   Gastrointestinal: Negative for abdominal pain and blood in stool.   Genitourinary: Negative for difficulty urinating and hematuria.   Musculoskeletal: Negative for arthralgias, back pain, gait problem, joint swelling, myalgias and neck pain.   Skin: Negative for color change and rash.   Neurological: Negative for tremors, speech difficulty, weakness, numbness and headaches.   Hematological: Negative for adenopathy. Does not bruise/bleed easily.   Psychiatric/Behavioral: Negative for behavioral problems, confusion and sleep disturbance. The patient is not nervous/anxious.        Objective:      Physical Exam  Vitals signs and nursing note reviewed.   Constitutional:       Appearance: He is well-developed. He is not diaphoretic.   Eyes:      General: No scleral icterus.  Neck:      Musculoskeletal: Normal  range of motion and neck supple.      Thyroid: No thyromegaly.      Vascular: No carotid bruit or JVD.      Trachea: No tracheal deviation.   Cardiovascular:      Rate and Rhythm: Normal rate and regular rhythm.      Heart sounds: Normal heart sounds. No murmur. No friction rub. No gallop.    Pulmonary:      Effort: Pulmonary effort is normal. No respiratory distress.      Breath sounds: Normal breath sounds. No wheezing or rales.   Abdominal:      General: There is no distension.      Palpations: Abdomen is soft. There is no mass.      Tenderness: There is no abdominal tenderness. There is no guarding or rebound.   Lymphadenopathy:      Cervical: No cervical adenopathy.   Skin:     General: Skin is warm and dry.      Findings: No erythema or rash.   Neurological:      Mental Status: He is alert and oriented to person, place, and time.      Cranial Nerves: No cranial nerve deficit.      Motor: No tremor.      Coordination: Coordination normal.      Gait: Gait normal.   Psychiatric:         Behavior: Behavior normal.         Thought Content: Thought content normal.         Judgment: Judgment normal.         Assessment:       1. Annual physical exam    2. Hypertension, essential    3. Hyperlipidemia, unspecified hyperlipidemia type    4. Prostate cancer screening    5. Tachycardia    6. Encounter for screening for malignant neoplasm of respiratory organs    7. Personal history of nicotine dependence    8. BPH with urinary obstruction        Plan:     Medication List with Changes/Refills   Current Medications    FLUTICASONE PROPIONATE (FLONASE) 50 MCG/ACTUATION NASAL SPRAY    1 spray (50 mcg total) by Each Nostril route once daily.   Changed and/or Refilled Medications    Modified Medication Previous Medication    LOSARTAN (COZAAR) 25 MG TABLET losartan (COZAAR) 25 MG tablet       Take 1 tablet (25 mg total) by mouth once daily.    Take 1 tablet (25 mg total) by mouth once daily.    PRAVASTATIN (PRAVACHOL) 40 MG TABLET  pravastatin (PRAVACHOL) 40 MG tablet       Take 1 tablet (40 mg total) by mouth once daily.    Take 1 tablet (40 mg total) by mouth once daily.    TAMSULOSIN (FLOMAX) 0.4 MG CAP tamsulosin (FLOMAX) 0.4 mg Cap       TAKE 1 CAPSULE(0.4 MG) BY MOUTH EVERY DAY    TAKE 1 CAPSULE(0.4 MG) BY MOUTH EVERY DAY     Nanda was seen today for follow-up.    Diagnoses and all orders for this visit:    Annual physical exam  -     Comprehensive metabolic panel; Future  -     Lipid Panel; Future  -     PSA, Screening; Future  -     CBC Without Differential; Future  -     TSH; Future  -     EKG 12-lead; Future    Hypertension, essential  -     Comprehensive metabolic panel; Future  -     losartan (COZAAR) 25 MG tablet; Take 1 tablet (25 mg total) by mouth once daily.    Hyperlipidemia, unspecified hyperlipidemia type  -     Lipid Panel; Future  -     pravastatin (PRAVACHOL) 40 MG tablet; Take 1 tablet (40 mg total) by mouth once daily.    Prostate cancer screening  -     PSA, Screening; Future    Tachycardia  -     CBC Without Differential; Future  -     TSH; Future  -     EKG 12-lead; Future    Encounter for screening for malignant neoplasm of respiratory organs  -     CT Chest Lung Screening Low Dose; Future    Personal history of nicotine dependence  -     CT Chest Lung Screening Low Dose; Future    BPH with urinary obstruction  -     tamsulosin (FLOMAX) 0.4 mg Cap; TAKE 1 CAPSULE(0.4 MG) BY MOUTH EVERY DAY      See meds, orders, follow up, routing and instructions sections of encounter and AVS. Discussed with patient and provided on AVS.      Asymptomatic mild tachycardia noted.  Patient to observe for any evolving symptoms.  Could of been nicotine, could have been he.  Nothing to suggest an arrhythmia at this time.

## 2020-07-13 NOTE — TELEPHONE ENCOUNTER
----- Message from Manuel Tavares sent at 7/13/2020 12:44 PM CDT -----  Regarding: High pulse  Appointment Request From: Nanda Colin III    With Provider: Horace Orr MD [[facility]]    Preferred Date Range: 7/13/2020 - 7/14/2020    Preferred Times: Any Time    Reason for visit: Office Visit    Comments:  Blood pressure elevated. Tried to donate blood pulse too high.

## 2020-07-14 ENCOUNTER — HOSPITAL ENCOUNTER (OUTPATIENT)
Dept: CARDIOLOGY | Facility: CLINIC | Age: 55
Discharge: HOME | End: 2020-07-14
Attending: FAMILY MEDICINE
Payer: COMMERCIAL

## 2020-07-14 DIAGNOSIS — R00.0 TACHYCARDIA: ICD-10-CM

## 2020-07-14 DIAGNOSIS — Z00.00 ANNUAL PHYSICAL EXAM: ICD-10-CM

## 2020-07-14 PROCEDURE — 93005 EKG 12-LEAD: ICD-10-PCS | Mod: S$GLB,,, | Performed by: FAMILY MEDICINE

## 2020-07-14 PROCEDURE — 93005 ELECTROCARDIOGRAM TRACING: CPT | Mod: S$GLB,,, | Performed by: FAMILY MEDICINE

## 2020-07-14 PROCEDURE — 93010 EKG 12-LEAD: ICD-10-PCS | Mod: S$GLB,,, | Performed by: INTERNAL MEDICINE

## 2020-07-14 PROCEDURE — 93010 ELECTROCARDIOGRAM REPORT: CPT | Mod: S$GLB,,, | Performed by: INTERNAL MEDICINE

## 2020-07-15 ENCOUNTER — TELEPHONE (OUTPATIENT)
Dept: INTERNAL MEDICINE | Facility: CLINIC | Age: 55
End: 2020-07-15

## 2020-07-15 ENCOUNTER — HOSPITAL ENCOUNTER (OUTPATIENT)
Dept: RADIOLOGY | Facility: HOSPITAL | Age: 55
Discharge: HOME | End: 2020-07-15
Attending: FAMILY MEDICINE
Payer: COMMERCIAL

## 2020-07-15 DIAGNOSIS — Z87.891 PERSONAL HISTORY OF NICOTINE DEPENDENCE: ICD-10-CM

## 2020-07-15 DIAGNOSIS — Z12.2 ENCOUNTER FOR SCREENING FOR MALIGNANT NEOPLASM OF RESPIRATORY ORGANS: ICD-10-CM

## 2020-07-15 DIAGNOSIS — Z87.891 PERSONAL HISTORY OF NICOTINE DEPENDENCE: Primary | ICD-10-CM

## 2020-07-15 PROCEDURE — G0297 LDCT FOR LUNG CA SCREEN: HCPCS | Mod: 26,,, | Performed by: RADIOLOGY

## 2020-07-15 PROCEDURE — G0297 LDCT FOR LUNG CA SCREEN: HCPCS | Mod: TC

## 2020-07-15 PROCEDURE — G0297 CT CHEST LUNG SCREENING LOW DOSE: ICD-10-PCS | Mod: 26,,, | Performed by: RADIOLOGY

## 2020-07-15 NOTE — TELEPHONE ENCOUNTER
There were no concerning areas on patient's chest CT scan. Previously reported abnormalities remain unchanged. I recommend a repeat scan in 1 year to continue surveillance. Please contact patient and report and also schedule follow up scan in 1 year - see orders. Thank you.

## 2020-07-16 ENCOUNTER — TELEPHONE (OUTPATIENT)
Dept: INTERNAL MEDICINE | Facility: CLINIC | Age: 55
End: 2020-07-16

## 2020-08-25 DIAGNOSIS — N40.1 BPH WITH URINARY OBSTRUCTION: ICD-10-CM

## 2020-08-25 DIAGNOSIS — I10 HYPERTENSION, ESSENTIAL: ICD-10-CM

## 2020-08-25 DIAGNOSIS — N13.8 BPH WITH URINARY OBSTRUCTION: ICD-10-CM

## 2020-08-25 DIAGNOSIS — E78.5 HYPERLIPIDEMIA, UNSPECIFIED HYPERLIPIDEMIA TYPE: ICD-10-CM

## 2020-08-25 RX ORDER — TAMSULOSIN HYDROCHLORIDE 0.4 MG/1
CAPSULE ORAL
Qty: 90 CAPSULE | Refills: 3 | Status: SHIPPED | OUTPATIENT
Start: 2020-08-25 | End: 2021-08-05 | Stop reason: SDUPTHER

## 2020-08-25 RX ORDER — LOSARTAN POTASSIUM 25 MG/1
TABLET ORAL
Qty: 90 TABLET | Refills: 3 | Status: SHIPPED | OUTPATIENT
Start: 2020-08-25 | End: 2021-08-05 | Stop reason: SDUPTHER

## 2020-08-25 RX ORDER — PRAVASTATIN SODIUM 40 MG/1
TABLET ORAL
Qty: 90 TABLET | Refills: 3 | Status: SHIPPED | OUTPATIENT
Start: 2020-08-25 | End: 2021-08-05 | Stop reason: SDUPTHER

## 2020-08-25 NOTE — PROGRESS NOTES
Refill Authorization Note    is requesting a refill authorization.    Brief assessment and rationale for refill: APPROVE: PRR          Medication Therapy Plan: CDMR; APPROVE ALL 3 MEDS PER PROTOCOL    Medication reconciliation completed: No                       Comments:          Requested Prescriptions   Pending Prescriptions Disp Refills    pravastatin (PRAVACHOL) 40 MG tablet [Pharmacy Med Name: PRAVASTATIN SODIUM 40 MG TAB] 90 tablet 3     Sig: TAKE 1 TABLET BY MOUTH EVERY DAY       Cardiovascular:  Antilipid - Statins Passed - 8/25/2020 12:30 AM        Passed - Patient is at least 18 years old        Passed - Office visit in past 12 months or future 90 days.     Recent Outpatient Visits            1 month ago Annual physical exam    [facility] juanita Weber MD    9 months ago Upper respiratory infection with cough and congestion    [facility] Karen Jimenes NP    11 months ago Gross hematuria    [facility] Angel Farris MD    11 months ago Urinary tract infection with hematuria, site unspecified    [facility] Titi Tavares MD    1 year ago Hypertension, essential    [facility] Horace Weber MD          Future Appointments              In 10 months [facility] OI-CT2 500 LB LIMIT [facility]                Passed - Lipid Panel completed in last 360 days     Lab Results   Component Value Date    CHOL 225 (H) 07/14/2020    HDL 37 (L) 07/14/2020    LDLCALC 130.8 07/14/2020    TRIG 286 (H) 07/14/2020             Passed - ALT is 94 or below and within 360 days     ALT   Date Value Ref Range Status   07/14/2020 42 10 - 44 U/L Final   02/16/2019 31 10 - 44 U/L Final   04/09/2018 44 10 - 44 U/L Final              Passed - AST is 54 or below and within 360 days     AST   Date Value Ref Range Status   07/14/2020 24 10 - 40 U/L Final    02/16/2019 21 10 - 40 U/L Final   04/09/2018 24 10 - 40 U/L Final                losartan (COZAAR) 25 MG tablet [Pharmacy Med Name: LOSARTAN POTASSIUM 25 MG TAB] 90 tablet 3     Sig: TAKE 1 TABLET BY MOUTH EVERY DAY       Cardiovascular:  Angiotensin Receptor Blockers Passed - 8/25/2020 12:30 AM        Passed - Patient is at least 18 years old        Passed - Last BP in normal range within 360 days.     BP Readings from Last 3 Encounters:   07/13/20 120/78   11/10/19 136/87   09/12/19 107/74              Passed - Office visit in past 12 months or future 90 days.     Recent Outpatient Visits            1 month ago Annual physical exam    [facility] Horace Weber MD    9 months ago Upper respiratory infection with cough and congestion    [facility] Karen Jimenes NP    11 months ago Gross hematuria    [facility] Angel Farris MD    11 months ago Urinary tract infection with hematuria, site unspecified    [facility] Titi Tavares MD    1 year ago Hypertension, essential    [facility] Horace Weber MD          Future Appointments              In 10 months [facility] OIC-CT2 500 LB LIMIT [facility]                Passed - Cr is 1.3 or below and within 360 days     Creatinine   Date Value Ref Range Status   07/14/2020 0.9 0.5 - 1.4 mg/dL Final   08/12/2019 1.0 0.5 - 1.4 mg/dL Final   02/16/2019 0.8 0.5 - 1.4 mg/dL Final     POC Creatinine   Date Value Ref Range Status   09/17/2019 1.0 0.5 - 1.4 mg/dL Final              Passed - K in normal range and within 360 days     Potassium   Date Value Ref Range Status   07/14/2020 4.3 3.5 - 5.1 mmol/L Final   08/12/2019 4.0 3.5 - 5.1 mmol/L Final   02/16/2019 4.0 3.5 - 5.1 mmol/L Final              Passed - eGFR within 360 days     eGFR if non    Date Value Ref Range Status   07/14/2020 >60.0  >60 mL/min/1.73 m^2 Final     Comment:     Calculation used to obtain the estimated glomerular filtration  rate (eGFR) is the CKD-EPI equation.      08/12/2019 >60 >60 mL/min/1.73 m^2 Final     Comment:     Calculation used to obtain the estimated glomerular filtration  rate (eGFR) is the CKD-EPI equation.      02/16/2019 >60 >60 mL/min/1.73 m^2 Final     Comment:     Calculation used to obtain the estimated glomerular filtration  rate (eGFR) is the CKD-EPI equation.        eGFR if    Date Value Ref Range Status   07/14/2020 >60.0 >60 mL/min/1.73 m^2 Final   08/12/2019 >60 >60 mL/min/1.73 m^2 Final   02/16/2019 >60 >60 mL/min/1.73 m^2 Final                tamsulosin (FLOMAX) 0.4 mg Cap [Pharmacy Med Name: TAMSULOSIN HCL 0.4 MG CAPSULE] 90 capsule 3     Sig: TAKE 1 CAPSULE BY MOUTH EVERY DAY       Urology: Alpha-Adrenergic Blocker Passed - 8/25/2020 12:30 AM        Passed - Patient is at least 18 years old        Passed - Last BP in normal range within 360 days.     BP Readings from Last 3 Encounters:   07/13/20 120/78   11/10/19 136/87   09/12/19 107/74              Passed - Office visit in past 12 months or future 90 days.     Recent Outpatient Visits            1 month ago Annual physical exam    [facility] Horace Weber MD    9 months ago Upper respiratory infection with cough and congestion    [facility] Karen Jimenes NP    11 months ago Gross hematuria    [facility] Angel Farris MD    11 months ago Urinary tract infection with hematuria, site unspecified    [facility] Titi Tavares MD    1 year ago Hypertension, essential    [facility] juanita Weber MD          Future Appointments              In 10 months [facility] OI-CT2 500 LB LIMIT [facility]                Passed - Prostate Cancer is not on problem list             Appointments  past 12m or future 3m with PCP    Date Provider   Last Visit   7/13/2020 Horace Weber MD   Next Visit   Visit date not found Horace Weber MD   ED visits in past 90 days: 0     Note composed:6:51 PM 08/25/2020

## 2020-08-25 NOTE — TELEPHONE ENCOUNTER
No new care gaps identified.  Powered by [employer]. Reference number: [id number]. 8/25/2020 12:31:03 AM   RAMONITAT

## 2021-06-13 ENCOUNTER — PATIENT MESSAGE (OUTPATIENT)
Dept: INTERNAL MEDICINE | Facility: CLINIC | Age: 56
End: 2021-06-13

## 2021-06-16 ENCOUNTER — PATIENT MESSAGE (OUTPATIENT)
Dept: INTERNAL MEDICINE | Facility: CLINIC | Age: 56
End: 2021-06-16

## 2021-06-16 DIAGNOSIS — Z00.00 ANNUAL PHYSICAL EXAM: Primary | ICD-10-CM

## 2021-06-16 DIAGNOSIS — Z12.5 PROSTATE CANCER SCREENING: ICD-10-CM

## 2021-06-16 DIAGNOSIS — I10 HYPERTENSION, ESSENTIAL: ICD-10-CM

## 2021-06-16 DIAGNOSIS — E78.5 HYPERLIPIDEMIA, UNSPECIFIED HYPERLIPIDEMIA TYPE: ICD-10-CM

## 2021-07-12 ENCOUNTER — HOSPITAL ENCOUNTER (OUTPATIENT)
Dept: RADIOLOGY | Facility: HOSPITAL | Age: 56
Discharge: HOME | End: 2021-07-12
Attending: FAMILY MEDICINE
Payer: COMMERCIAL

## 2021-07-12 DIAGNOSIS — Z87.891 PERSONAL HISTORY OF NICOTINE DEPENDENCE: ICD-10-CM

## 2021-07-12 PROCEDURE — 71250 CT THORAX DX C-: CPT | Mod: 26,,, | Performed by: RADIOLOGY

## 2021-07-12 PROCEDURE — 71250 CT CHEST WITHOUT CONTRAST: ICD-10-PCS | Mod: 26,,, | Performed by: RADIOLOGY

## 2021-07-12 PROCEDURE — 71250 CT THORAX DX C-: CPT | Mod: TC

## 2021-08-03 ENCOUNTER — LAB VISIT (OUTPATIENT)
Dept: LAB | Facility: HOSPITAL | Age: 56
End: 2021-08-03
Attending: FAMILY MEDICINE
Payer: COMMERCIAL

## 2021-08-03 DIAGNOSIS — Z00.00 ANNUAL PHYSICAL EXAM: ICD-10-CM

## 2021-08-03 DIAGNOSIS — E78.5 HYPERLIPIDEMIA, UNSPECIFIED HYPERLIPIDEMIA TYPE: ICD-10-CM

## 2021-08-03 DIAGNOSIS — I10 HYPERTENSION, ESSENTIAL: ICD-10-CM

## 2021-08-03 DIAGNOSIS — Z12.5 PROSTATE CANCER SCREENING: ICD-10-CM

## 2021-08-03 LAB
ALBUMIN SERPL BCP-MCNC: 3.8 G/DL (ref 3.5–5.2)
ALP SERPL-CCNC: 68 U/L (ref 55–135)
ALT SERPL W/O P-5'-P-CCNC: 22 U/L (ref 10–44)
ANION GAP SERPL CALC-SCNC: 10 MMOL/L (ref 8–16)
AST SERPL-CCNC: 24 U/L (ref 10–40)
BILIRUB SERPL-MCNC: 0.8 MG/DL (ref 0.1–1)
BUN SERPL-MCNC: 10 MG/DL (ref 6–20)
CALCIUM SERPL-MCNC: 9.7 MG/DL (ref 8.7–10.5)
CHLORIDE SERPL-SCNC: 108 MMOL/L (ref 95–110)
CHOLEST SERPL-MCNC: 180 MG/DL (ref 120–199)
CHOLEST/HDLC SERPL: 5.8 {RATIO} (ref 2–5)
CO2 SERPL-SCNC: 21 MMOL/L (ref 23–29)
COMPLEXED PSA SERPL-MCNC: 2.5 NG/ML (ref 0–4)
CREAT SERPL-MCNC: 0.8 MG/DL (ref 0.5–1.4)
EST. GFR  (AFRICAN AMERICAN): >60 ML/MIN/1.73 M^2
EST. GFR  (NON AFRICAN AMERICAN): >60 ML/MIN/1.73 M^2
GLUCOSE SERPL-MCNC: 95 MG/DL (ref 70–110)
HDLC SERPL-MCNC: 31 MG/DL (ref 40–75)
HDLC SERPL: 17.2 % (ref 20–50)
LDLC SERPL CALC-MCNC: 119.6 MG/DL (ref 63–159)
NONHDLC SERPL-MCNC: 149 MG/DL
POTASSIUM SERPL-SCNC: 4.1 MMOL/L (ref 3.5–5.1)
PROT SERPL-MCNC: 6.9 G/DL (ref 6–8.4)
SODIUM SERPL-SCNC: 139 MMOL/L (ref 136–145)
TRIGL SERPL-MCNC: 147 MG/DL (ref 30–150)

## 2021-08-03 PROCEDURE — 80053 COMPREHEN METABOLIC PANEL: CPT | Performed by: FAMILY MEDICINE

## 2021-08-03 PROCEDURE — 84153 ASSAY OF PSA TOTAL: CPT | Performed by: FAMILY MEDICINE

## 2021-08-03 PROCEDURE — 36415 COLL VENOUS BLD VENIPUNCTURE: CPT | Performed by: FAMILY MEDICINE

## 2021-08-03 PROCEDURE — 80061 LIPID PANEL: CPT | Performed by: FAMILY MEDICINE

## 2021-08-05 ENCOUNTER — OFFICE VISIT (OUTPATIENT)
Dept: INTERNAL MEDICINE | Facility: CLINIC | Age: 56
End: 2021-08-05
Attending: FAMILY MEDICINE
Payer: COMMERCIAL

## 2021-08-05 VITALS
HEART RATE: 80 BPM | WEIGHT: 196.31 LBS | OXYGEN SATURATION: 96 % | BODY MASS INDEX: 32.71 KG/M2 | DIASTOLIC BLOOD PRESSURE: 84 MMHG | HEIGHT: 65 IN | SYSTOLIC BLOOD PRESSURE: 132 MMHG

## 2021-08-05 DIAGNOSIS — M47.816 LUMBAR SPONDYLOSIS: ICD-10-CM

## 2021-08-05 DIAGNOSIS — N13.8 BPH WITH URINARY OBSTRUCTION: ICD-10-CM

## 2021-08-05 DIAGNOSIS — Z00.00 ANNUAL PHYSICAL EXAM: Primary | ICD-10-CM

## 2021-08-05 DIAGNOSIS — E78.5 HYPERLIPIDEMIA, UNSPECIFIED HYPERLIPIDEMIA TYPE: ICD-10-CM

## 2021-08-05 DIAGNOSIS — N40.1 BPH WITH URINARY OBSTRUCTION: ICD-10-CM

## 2021-08-05 DIAGNOSIS — Z80.42 FAMILY HISTORY OF PROSTATE CANCER: ICD-10-CM

## 2021-08-05 DIAGNOSIS — I10 HYPERTENSION, ESSENTIAL: ICD-10-CM

## 2021-08-05 PROCEDURE — 3079F PR MOST RECENT DIASTOLIC BLOOD PRESSURE 80-89 MM HG: ICD-10-PCS | Mod: CPTII,S$GLB,, | Performed by: FAMILY MEDICINE

## 2021-08-05 PROCEDURE — 99999 PR PBB SHADOW E&M-EST. PATIENT-LVL IV: ICD-10-PCS | Mod: PBBFAC,,, | Performed by: FAMILY MEDICINE

## 2021-08-05 PROCEDURE — 99396 PREV VISIT EST AGE 40-64: CPT | Mod: S$GLB,,, | Performed by: FAMILY MEDICINE

## 2021-08-05 PROCEDURE — 1159F MED LIST DOCD IN RCRD: CPT | Mod: CPTII,S$GLB,, | Performed by: FAMILY MEDICINE

## 2021-08-05 PROCEDURE — 1159F PR MEDICATION LIST DOCUMENTED IN MEDICAL RECORD: ICD-10-PCS | Mod: CPTII,S$GLB,, | Performed by: FAMILY MEDICINE

## 2021-08-05 PROCEDURE — 3008F PR BODY MASS INDEX (BMI) DOCUMENTED: ICD-10-PCS | Mod: CPTII,S$GLB,, | Performed by: FAMILY MEDICINE

## 2021-08-05 PROCEDURE — 1160F RVW MEDS BY RX/DR IN RCRD: CPT | Mod: CPTII,S$GLB,, | Performed by: FAMILY MEDICINE

## 2021-08-05 PROCEDURE — 3075F PR MOST RECENT SYSTOLIC BLOOD PRESS GE 130-139MM HG: ICD-10-PCS | Mod: CPTII,S$GLB,, | Performed by: FAMILY MEDICINE

## 2021-08-05 PROCEDURE — 3008F BODY MASS INDEX DOCD: CPT | Mod: CPTII,S$GLB,, | Performed by: FAMILY MEDICINE

## 2021-08-05 PROCEDURE — 3075F SYST BP GE 130 - 139MM HG: CPT | Mod: CPTII,S$GLB,, | Performed by: FAMILY MEDICINE

## 2021-08-05 PROCEDURE — 99999 PR PBB SHADOW E&M-EST. PATIENT-LVL IV: CPT | Mod: PBBFAC,,, | Performed by: FAMILY MEDICINE

## 2021-08-05 PROCEDURE — 1160F PR REVIEW ALL MEDS BY PRESCRIBER/CLIN PHARMACIST DOCUMENTED: ICD-10-PCS | Mod: CPTII,S$GLB,, | Performed by: FAMILY MEDICINE

## 2021-08-05 PROCEDURE — 3079F DIAST BP 80-89 MM HG: CPT | Mod: CPTII,S$GLB,, | Performed by: FAMILY MEDICINE

## 2021-08-05 PROCEDURE — 99396 PR PREVENTIVE VISIT,EST,40-64: ICD-10-PCS | Mod: S$GLB,,, | Performed by: FAMILY MEDICINE

## 2021-08-05 PROCEDURE — 1126F AMNT PAIN NOTED NONE PRSNT: CPT | Mod: CPTII,S$GLB,, | Performed by: FAMILY MEDICINE

## 2021-08-05 PROCEDURE — 1126F PR PAIN SEVERITY QUANTIFIED, NO PAIN PRESENT: ICD-10-PCS | Mod: CPTII,S$GLB,, | Performed by: FAMILY MEDICINE

## 2021-08-05 RX ORDER — LOSARTAN POTASSIUM 25 MG/1
25 TABLET ORAL DAILY
Qty: 90 TABLET | Refills: 3 | Status: SHIPPED | OUTPATIENT
Start: 2021-08-05 | End: 2022-09-14

## 2021-08-05 RX ORDER — SILDENAFIL 100 MG/1
100 TABLET, FILM COATED ORAL DAILY PRN
Qty: 30 TABLET | Refills: 2 | Status: SHIPPED | OUTPATIENT
Start: 2021-08-05 | End: 2023-03-27 | Stop reason: SDUPTHER

## 2021-08-05 RX ORDER — CLOTRIMAZOLE 1 %
CREAM (GRAM) TOPICAL 2 TIMES DAILY
Qty: 60 G | Refills: 0 | Status: SHIPPED | OUTPATIENT
Start: 2021-08-05 | End: 2022-10-17 | Stop reason: SDUPTHER

## 2021-08-05 RX ORDER — TAMSULOSIN HYDROCHLORIDE 0.4 MG/1
CAPSULE ORAL
Qty: 90 CAPSULE | Refills: 3 | Status: SHIPPED | OUTPATIENT
Start: 2021-08-05 | End: 2022-09-14

## 2021-08-05 RX ORDER — PRAVASTATIN SODIUM 80 MG/1
80 TABLET ORAL DAILY
Qty: 90 TABLET | Refills: 3 | Status: SHIPPED | OUTPATIENT
Start: 2021-08-05 | End: 2022-08-07

## 2021-08-07 ENCOUNTER — HOSPITAL ENCOUNTER (EMERGENCY)
Facility: HOSPITAL | Age: 56
Discharge: HOME | End: 2021-08-07
Attending: EMERGENCY MEDICINE
Payer: COMMERCIAL

## 2021-08-07 VITALS
SYSTOLIC BLOOD PRESSURE: 137 MMHG | HEIGHT: 64 IN | OXYGEN SATURATION: 100 % | HEART RATE: 108 BPM | DIASTOLIC BLOOD PRESSURE: 88 MMHG | TEMPERATURE: 99 F | WEIGHT: 195 LBS | BODY MASS INDEX: 33.29 KG/M2 | RESPIRATION RATE: 16 BRPM

## 2021-08-07 DIAGNOSIS — S61.412A LACERATION OF LEFT HAND, FOREIGN BODY PRESENCE UNSPECIFIED, INITIAL ENCOUNTER: Primary | ICD-10-CM

## 2021-08-07 PROCEDURE — 99284 PR EMERGENCY DEPT VISIT,LEVEL IV: ICD-10-PCS | Mod: 25,,, | Performed by: EMERGENCY MEDICINE

## 2021-08-07 PROCEDURE — 12002 PR RESUP NPTERF WND BODY 2.6-7.5 CM: ICD-10-PCS | Mod: 52,,, | Performed by: EMERGENCY MEDICINE

## 2021-08-07 PROCEDURE — 12002 RPR S/N/AX/GEN/TRNK2.6-7.5CM: CPT

## 2021-08-07 PROCEDURE — 90471 IMMUNIZATION ADMIN: CPT | Performed by: EMERGENCY MEDICINE

## 2021-08-07 PROCEDURE — 90715 TDAP VACCINE 7 YRS/> IM: CPT | Performed by: EMERGENCY MEDICINE

## 2021-08-07 PROCEDURE — 63600175 PHARM REV CODE 636 W HCPCS: Performed by: EMERGENCY MEDICINE

## 2021-08-07 PROCEDURE — 99284 EMERGENCY DEPT VISIT MOD MDM: CPT | Mod: 25

## 2021-08-07 PROCEDURE — 99284 EMERGENCY DEPT VISIT MOD MDM: CPT | Mod: 25,,, | Performed by: EMERGENCY MEDICINE

## 2021-08-07 PROCEDURE — 25000003 PHARM REV CODE 250: Performed by: EMERGENCY MEDICINE

## 2021-08-07 PROCEDURE — 96372 THER/PROPH/DIAG INJ SC/IM: CPT | Mod: 59

## 2021-08-07 PROCEDURE — 12002 RPR S/N/AX/GEN/TRNK2.6-7.5CM: CPT | Mod: 52,,, | Performed by: EMERGENCY MEDICINE

## 2021-08-07 RX ORDER — BACITRACIN ZINC 500 [USP'U]/G
1 OINTMENT TOPICAL
Status: COMPLETED | OUTPATIENT
Start: 2021-08-07 | End: 2021-08-07

## 2021-08-07 RX ORDER — CEFAZOLIN SODIUM 1 G/3ML
1 INJECTION, POWDER, FOR SOLUTION INTRAMUSCULAR; INTRAVENOUS
Status: COMPLETED | OUTPATIENT
Start: 2021-08-07 | End: 2021-08-07

## 2021-08-07 RX ORDER — SULFAMETHOXAZOLE AND TRIMETHOPRIM 800; 160 MG/1; MG/1
1 TABLET ORAL 2 TIMES DAILY
Qty: 20 TABLET | Refills: 0 | Status: SHIPPED | OUTPATIENT
Start: 2021-08-07 | End: 2021-08-17

## 2021-08-07 RX ORDER — LIDOCAINE HYDROCHLORIDE AND EPINEPHRINE 10; 10 MG/ML; UG/ML
10 INJECTION, SOLUTION INFILTRATION; PERINEURAL ONCE
Status: COMPLETED | OUTPATIENT
Start: 2021-08-07 | End: 2021-08-07

## 2021-08-07 RX ADMIN — BACITRACIN 1 EACH: 500 OINTMENT TOPICAL at 06:08

## 2021-08-07 RX ADMIN — LIDOCAINE HYDROCHLORIDE AND EPINEPHRINE 10 ML: 10; 10 INJECTION, SOLUTION INFILTRATION; PERINEURAL at 07:08

## 2021-08-07 RX ADMIN — CLOSTRIDIUM TETANI TOXOID ANTIGEN (FORMALDEHYDE INACTIVATED), CORYNEBACTERIUM DIPHTHERIAE TOXOID ANTIGEN (FORMALDEHYDE INACTIVATED), BORDETELLA PERTUSSIS TOXOID ANTIGEN (GLUTARALDEHYDE INACTIVATED), BORDETELLA PERTUSSIS FILAMENTOUS HEMAGGLUTININ ANTIGEN (FORMALDEHYDE INACTIVATED), BORDETELLA PERTUSSIS PERTACTIN ANTIGEN, AND BORDETELLA PERTUSSIS FIMBRIAE 2/3 ANTIGEN 0.5 ML: 5; 2; 2.5; 5; 3; 5 INJECTION, SUSPENSION INTRAMUSCULAR at 06:08

## 2021-08-07 RX ADMIN — CEFAZOLIN 1 G: 330 INJECTION, POWDER, FOR SOLUTION INTRAMUSCULAR; INTRAVENOUS at 08:08

## 2021-08-09 ENCOUNTER — TELEPHONE (OUTPATIENT)
Dept: ORTHOPEDICS | Facility: CLINIC | Age: 56
End: 2021-08-09

## 2021-08-13 ENCOUNTER — PATIENT OUTREACH (OUTPATIENT)
Dept: ADMINISTRATIVE | Facility: OTHER | Age: 56
End: 2021-08-13

## 2021-08-16 ENCOUNTER — TELEPHONE (OUTPATIENT)
Dept: INTERNAL MEDICINE | Facility: CLINIC | Age: 56
End: 2021-08-16

## 2021-08-16 DIAGNOSIS — I25.10 ASCVD (ARTERIOSCLEROTIC CARDIOVASCULAR DISEASE): Primary | ICD-10-CM

## 2021-08-16 DIAGNOSIS — Z87.891 PERSONAL HISTORY OF NICOTINE DEPENDENCE: ICD-10-CM

## 2021-08-16 DIAGNOSIS — E78.5 HYPERLIPIDEMIA, UNSPECIFIED HYPERLIPIDEMIA TYPE: ICD-10-CM

## 2021-09-08 DIAGNOSIS — E78.5 HYPERLIPIDEMIA, UNSPECIFIED HYPERLIPIDEMIA TYPE: ICD-10-CM

## 2021-09-09 RX ORDER — PRAVASTATIN SODIUM 40 MG/1
TABLET ORAL
Qty: 90 TABLET | Refills: 3 | OUTPATIENT
Start: 2021-09-09

## 2021-10-24 ENCOUNTER — PATIENT MESSAGE (OUTPATIENT)
Dept: INTERNAL MEDICINE | Facility: CLINIC | Age: 56
End: 2021-10-24
Payer: COMMERCIAL

## 2021-11-18 ENCOUNTER — LAB VISIT (OUTPATIENT)
Dept: LAB | Facility: HOSPITAL | Age: 56
End: 2021-11-18
Attending: FAMILY MEDICINE
Payer: COMMERCIAL

## 2021-11-18 ENCOUNTER — PATIENT MESSAGE (OUTPATIENT)
Dept: INTERNAL MEDICINE | Facility: CLINIC | Age: 56
End: 2021-11-18
Payer: COMMERCIAL

## 2021-11-18 DIAGNOSIS — E78.5 HYPERLIPIDEMIA, UNSPECIFIED HYPERLIPIDEMIA TYPE: ICD-10-CM

## 2021-11-18 DIAGNOSIS — I25.10 ASCVD (ARTERIOSCLEROTIC CARDIOVASCULAR DISEASE): ICD-10-CM

## 2021-11-18 LAB
ALT SERPL W/O P-5'-P-CCNC: 25 U/L (ref 10–44)
AST SERPL-CCNC: 23 U/L (ref 10–40)
CHOLEST SERPL-MCNC: 197 MG/DL (ref 120–199)
CHOLEST/HDLC SERPL: 5.8 {RATIO} (ref 2–5)
HDLC SERPL-MCNC: 34 MG/DL (ref 40–75)
HDLC SERPL: 17.3 % (ref 20–50)
LDLC SERPL CALC-MCNC: 109.2 MG/DL (ref 63–159)
NONHDLC SERPL-MCNC: 163 MG/DL
TRIGL SERPL-MCNC: 269 MG/DL (ref 30–150)

## 2021-11-18 PROCEDURE — 84460 ALANINE AMINO (ALT) (SGPT): CPT | Performed by: FAMILY MEDICINE

## 2021-11-18 PROCEDURE — 36415 COLL VENOUS BLD VENIPUNCTURE: CPT | Performed by: FAMILY MEDICINE

## 2021-11-18 PROCEDURE — 80061 LIPID PANEL: CPT | Performed by: FAMILY MEDICINE

## 2021-11-18 PROCEDURE — 84450 TRANSFERASE (AST) (SGOT): CPT | Performed by: FAMILY MEDICINE

## 2021-11-23 ENCOUNTER — TELEPHONE (OUTPATIENT)
Dept: PAIN MEDICINE | Facility: CLINIC | Age: 56
End: 2021-11-23

## 2021-11-23 ENCOUNTER — OFFICE VISIT (OUTPATIENT)
Dept: PAIN MEDICINE | Facility: CLINIC | Age: 56
End: 2021-11-23
Payer: COMMERCIAL

## 2021-11-23 VITALS — SYSTOLIC BLOOD PRESSURE: 137 MMHG | HEART RATE: 93 BPM | DIASTOLIC BLOOD PRESSURE: 94 MMHG

## 2021-11-23 DIAGNOSIS — M54.16 LUMBAR RADICULOPATHY: Primary | ICD-10-CM

## 2021-11-23 DIAGNOSIS — G89.29 CHRONIC BILATERAL LOW BACK PAIN WITH RIGHT-SIDED SCIATICA: ICD-10-CM

## 2021-11-23 DIAGNOSIS — M47.816 LUMBAR SPONDYLOSIS: ICD-10-CM

## 2021-11-23 DIAGNOSIS — M54.16 LUMBAR RADICULOPATHY, CHRONIC: Primary | ICD-10-CM

## 2021-11-23 DIAGNOSIS — M54.41 CHRONIC BILATERAL LOW BACK PAIN WITH RIGHT-SIDED SCIATICA: ICD-10-CM

## 2021-11-23 PROCEDURE — 99999 PR PBB SHADOW E&M-EST. PATIENT-LVL III: CPT | Mod: PBBFAC,,, | Performed by: PHYSICAL MEDICINE & REHABILITATION

## 2021-11-23 PROCEDURE — 4010F PR ACE/ARB THEARPY RXD/TAKEN: ICD-10-PCS | Mod: CPTII,S$GLB,, | Performed by: PHYSICAL MEDICINE & REHABILITATION

## 2021-11-23 PROCEDURE — 4010F ACE/ARB THERAPY RXD/TAKEN: CPT | Mod: CPTII,S$GLB,, | Performed by: PHYSICAL MEDICINE & REHABILITATION

## 2021-11-23 PROCEDURE — 99204 OFFICE O/P NEW MOD 45 MIN: CPT | Mod: S$GLB,,, | Performed by: PHYSICAL MEDICINE & REHABILITATION

## 2021-11-23 PROCEDURE — 99204 PR OFFICE/OUTPT VISIT, NEW, LEVL IV, 45-59 MIN: ICD-10-PCS | Mod: S$GLB,,, | Performed by: PHYSICAL MEDICINE & REHABILITATION

## 2021-11-23 PROCEDURE — 99999 PR PBB SHADOW E&M-EST. PATIENT-LVL III: ICD-10-PCS | Mod: PBBFAC,,, | Performed by: PHYSICAL MEDICINE & REHABILITATION

## 2021-11-23 RX ORDER — CELECOXIB 200 MG/1
200 CAPSULE ORAL 2 TIMES DAILY PRN
Qty: 60 CAPSULE | Refills: 0 | Status: SHIPPED | OUTPATIENT
Start: 2021-11-23 | End: 2021-12-20

## 2021-12-07 ENCOUNTER — HOSPITAL ENCOUNTER (OUTPATIENT)
Dept: RADIOLOGY | Facility: HOSPITAL | Age: 56
Discharge: HOME | End: 2021-12-07
Attending: PHYSICAL MEDICINE & REHABILITATION
Payer: COMMERCIAL

## 2021-12-07 DIAGNOSIS — M54.16 LUMBAR RADICULOPATHY, CHRONIC: ICD-10-CM

## 2021-12-07 PROCEDURE — 72148 MRI LUMBAR SPINE W/O DYE: CPT | Mod: TC

## 2021-12-07 PROCEDURE — 72148 MRI LUMBAR SPINE WITHOUT CONTRAST: ICD-10-PCS | Mod: 26,,, | Performed by: RADIOLOGY

## 2021-12-07 PROCEDURE — 72148 MRI LUMBAR SPINE W/O DYE: CPT | Mod: 26,,, | Performed by: RADIOLOGY

## 2021-12-08 ENCOUNTER — PATIENT OUTREACH (OUTPATIENT)
Dept: ADMINISTRATIVE | Facility: OTHER | Age: 56
End: 2021-12-08
Payer: COMMERCIAL

## 2021-12-10 ENCOUNTER — OFFICE VISIT (OUTPATIENT)
Dept: PAIN MEDICINE | Facility: CLINIC | Age: 56
End: 2021-12-10
Payer: COMMERCIAL

## 2021-12-10 VITALS
WEIGHT: 195.13 LBS | BODY MASS INDEX: 33.49 KG/M2 | DIASTOLIC BLOOD PRESSURE: 83 MMHG | SYSTOLIC BLOOD PRESSURE: 120 MMHG | HEART RATE: 94 BPM

## 2021-12-10 DIAGNOSIS — M54.16 LUMBAR RADICULOPATHY, CHRONIC: ICD-10-CM

## 2021-12-10 DIAGNOSIS — M51.36 ANNULAR TEAR OF LUMBAR DISC: ICD-10-CM

## 2021-12-10 DIAGNOSIS — M54.41 CHRONIC BILATERAL LOW BACK PAIN WITH RIGHT-SIDED SCIATICA: ICD-10-CM

## 2021-12-10 DIAGNOSIS — M54.16 LUMBAR RADICULOPATHY: Primary | ICD-10-CM

## 2021-12-10 DIAGNOSIS — G89.29 CHRONIC BILATERAL LOW BACK PAIN WITH RIGHT-SIDED SCIATICA: ICD-10-CM

## 2021-12-10 PROCEDURE — 99999 PR PBB SHADOW E&M-EST. PATIENT-LVL III: ICD-10-PCS | Mod: PBBFAC,,, | Performed by: NURSE PRACTITIONER

## 2021-12-10 PROCEDURE — 99214 OFFICE O/P EST MOD 30 MIN: CPT | Mod: S$GLB,,, | Performed by: NURSE PRACTITIONER

## 2021-12-10 PROCEDURE — 99999 PR PBB SHADOW E&M-EST. PATIENT-LVL III: CPT | Mod: PBBFAC,,, | Performed by: NURSE PRACTITIONER

## 2021-12-10 PROCEDURE — 4010F ACE/ARB THERAPY RXD/TAKEN: CPT | Mod: CPTII,S$GLB,, | Performed by: NURSE PRACTITIONER

## 2021-12-10 PROCEDURE — 4010F PR ACE/ARB THEARPY RXD/TAKEN: ICD-10-PCS | Mod: CPTII,S$GLB,, | Performed by: NURSE PRACTITIONER

## 2021-12-10 PROCEDURE — 99214 PR OFFICE/OUTPT VISIT, EST, LEVL IV, 30-39 MIN: ICD-10-PCS | Mod: S$GLB,,, | Performed by: NURSE PRACTITIONER

## 2021-12-14 ENCOUNTER — TELEPHONE (OUTPATIENT)
Dept: PAIN MEDICINE | Facility: CLINIC | Age: 56
End: 2021-12-14
Payer: COMMERCIAL

## 2021-12-15 ENCOUNTER — HOSPITAL ENCOUNTER (OUTPATIENT)
Facility: HOSPITAL | Age: 56
Discharge: HOME | End: 2021-12-15
Attending: PHYSICAL MEDICINE & REHABILITATION | Admitting: PHYSICAL MEDICINE & REHABILITATION
Payer: COMMERCIAL

## 2021-12-15 VITALS
RESPIRATION RATE: 18 BRPM | BODY MASS INDEX: 30.82 KG/M2 | TEMPERATURE: 98 F | HEART RATE: 68 BPM | OXYGEN SATURATION: 98 % | HEIGHT: 65 IN | SYSTOLIC BLOOD PRESSURE: 151 MMHG | DIASTOLIC BLOOD PRESSURE: 87 MMHG | WEIGHT: 185 LBS

## 2021-12-15 DIAGNOSIS — R52 PAIN: ICD-10-CM

## 2021-12-15 DIAGNOSIS — M54.16 LUMBAR RADICULOPATHY: Primary | ICD-10-CM

## 2021-12-15 PROCEDURE — 63600175 PHARM REV CODE 636 W HCPCS: Performed by: PHYSICAL MEDICINE & REHABILITATION

## 2021-12-15 PROCEDURE — 64484 NJX AA&/STRD TFRM EPI L/S EA: CPT | Mod: RT,,, | Performed by: PHYSICAL MEDICINE & REHABILITATION

## 2021-12-15 PROCEDURE — 64484 PRA INJECT ANES/STEROID FORAMEN LUMBAR/SACRAL W IMG GUIDE ,EA ADD LEVEL: ICD-10-PCS | Mod: RT,,, | Performed by: PHYSICAL MEDICINE & REHABILITATION

## 2021-12-15 PROCEDURE — 64484 NJX AA&/STRD TFRM EPI L/S EA: CPT | Performed by: PHYSICAL MEDICINE & REHABILITATION

## 2021-12-15 PROCEDURE — 64483 PR EPIDURAL INJ, ANES/STEROID, TRANSFORAMINAL, LUMB/SACR, SNGL LEVL: ICD-10-PCS | Mod: RT,,, | Performed by: PHYSICAL MEDICINE & REHABILITATION

## 2021-12-15 PROCEDURE — 64483 NJX AA&/STRD TFRM EPI L/S 1: CPT | Mod: RT,,, | Performed by: PHYSICAL MEDICINE & REHABILITATION

## 2021-12-15 PROCEDURE — 64483 NJX AA&/STRD TFRM EPI L/S 1: CPT | Performed by: PHYSICAL MEDICINE & REHABILITATION

## 2021-12-15 PROCEDURE — 25000003 PHARM REV CODE 250: Performed by: PHYSICAL MEDICINE & REHABILITATION

## 2021-12-15 PROCEDURE — 25500020 PHARM REV CODE 255: Performed by: PHYSICAL MEDICINE & REHABILITATION

## 2021-12-15 RX ORDER — DEXAMETHASONE SODIUM PHOSPHATE 10 MG/ML
INJECTION INTRAMUSCULAR; INTRAVENOUS
Status: DISCONTINUED | OUTPATIENT
Start: 2021-12-15 | End: 2021-12-15 | Stop reason: HOSPADM

## 2021-12-15 RX ORDER — INDOMETHACIN 25 MG/1
CAPSULE ORAL
Status: DISCONTINUED | OUTPATIENT
Start: 2021-12-15 | End: 2021-12-15 | Stop reason: HOSPADM

## 2021-12-15 RX ORDER — LIDOCAINE HYDROCHLORIDE 10 MG/ML
INJECTION, SOLUTION EPIDURAL; INFILTRATION; INTRACAUDAL; PERINEURAL
Status: DISCONTINUED | OUTPATIENT
Start: 2021-12-15 | End: 2021-12-15 | Stop reason: HOSPADM

## 2021-12-15 RX ORDER — LIDOCAINE HYDROCHLORIDE 10 MG/ML
INJECTION INFILTRATION; PERINEURAL
Status: DISCONTINUED | OUTPATIENT
Start: 2021-12-15 | End: 2021-12-15 | Stop reason: HOSPADM

## 2022-01-10 ENCOUNTER — PATIENT MESSAGE (OUTPATIENT)
Dept: PAIN MEDICINE | Facility: CLINIC | Age: 57
End: 2022-01-10
Payer: COMMERCIAL

## 2022-01-14 ENCOUNTER — PATIENT OUTREACH (OUTPATIENT)
Dept: ADMINISTRATIVE | Facility: OTHER | Age: 57
End: 2022-01-14
Payer: COMMERCIAL

## 2022-01-14 NOTE — PROGRESS NOTES
Health Maintenance Due   Topic Date Due    Hepatitis C Screening  Never done    HIV Screening  Never done    Shingles Vaccine (1 of 2) Never done    Influenza Vaccine (1) Never done    COVID-19 Vaccine (3 - Booster for Moderna series) 09/11/2021    Colorectal Cancer Screening  01/14/2022     Updates were requested from care everywhere.  Chart was reviewed for overdue [facility]ters ([facility]) topics (CRS, Breast Cancer Screening, Eye exam)  Health Maintenance has been updated.  LINKS immunization registry triggered.  Immunizations were reconciled.

## 2022-02-01 ENCOUNTER — OFFICE VISIT (OUTPATIENT)
Dept: PAIN MEDICINE | Facility: CLINIC | Age: 57
End: 2022-02-01
Payer: COMMERCIAL

## 2022-02-01 VITALS — HEART RATE: 97 BPM | DIASTOLIC BLOOD PRESSURE: 87 MMHG | SYSTOLIC BLOOD PRESSURE: 124 MMHG

## 2022-02-01 DIAGNOSIS — G89.29 CHRONIC BILATERAL LOW BACK PAIN WITH RIGHT-SIDED SCIATICA: ICD-10-CM

## 2022-02-01 DIAGNOSIS — M54.41 CHRONIC BILATERAL LOW BACK PAIN WITH RIGHT-SIDED SCIATICA: ICD-10-CM

## 2022-02-01 DIAGNOSIS — R52 PAIN: ICD-10-CM

## 2022-02-01 DIAGNOSIS — M47.816 LUMBAR SPONDYLOSIS: ICD-10-CM

## 2022-02-01 DIAGNOSIS — M54.16 LUMBAR RADICULOPATHY, CHRONIC: ICD-10-CM

## 2022-02-01 DIAGNOSIS — M54.16 LUMBAR RADICULOPATHY: Primary | ICD-10-CM

## 2022-02-01 DIAGNOSIS — M51.36 ANNULAR TEAR OF LUMBAR DISC: ICD-10-CM

## 2022-02-01 PROCEDURE — 3079F PR MOST RECENT DIASTOLIC BLOOD PRESSURE 80-89 MM HG: ICD-10-PCS | Mod: CPTII,S$GLB,, | Performed by: NURSE PRACTITIONER

## 2022-02-01 PROCEDURE — 1159F PR MEDICATION LIST DOCUMENTED IN MEDICAL RECORD: ICD-10-PCS | Mod: CPTII,S$GLB,, | Performed by: NURSE PRACTITIONER

## 2022-02-01 PROCEDURE — 99213 OFFICE O/P EST LOW 20 MIN: CPT | Mod: S$GLB,,, | Performed by: NURSE PRACTITIONER

## 2022-02-01 PROCEDURE — 3074F PR MOST RECENT SYSTOLIC BLOOD PRESSURE < 130 MM HG: ICD-10-PCS | Mod: CPTII,S$GLB,, | Performed by: NURSE PRACTITIONER

## 2022-02-01 PROCEDURE — 99999 PR PBB SHADOW E&M-EST. PATIENT-LVL III: ICD-10-PCS | Mod: PBBFAC,,, | Performed by: NURSE PRACTITIONER

## 2022-02-01 PROCEDURE — 1159F MED LIST DOCD IN RCRD: CPT | Mod: CPTII,S$GLB,, | Performed by: NURSE PRACTITIONER

## 2022-02-01 PROCEDURE — 1160F PR REVIEW ALL MEDS BY PRESCRIBER/CLIN PHARMACIST DOCUMENTED: ICD-10-PCS | Mod: CPTII,S$GLB,, | Performed by: NURSE PRACTITIONER

## 2022-02-01 PROCEDURE — 99213 PR OFFICE/OUTPT VISIT, EST, LEVL III, 20-29 MIN: ICD-10-PCS | Mod: S$GLB,,, | Performed by: NURSE PRACTITIONER

## 2022-02-01 PROCEDURE — 3074F SYST BP LT 130 MM HG: CPT | Mod: CPTII,S$GLB,, | Performed by: NURSE PRACTITIONER

## 2022-02-01 PROCEDURE — 3079F DIAST BP 80-89 MM HG: CPT | Mod: CPTII,S$GLB,, | Performed by: NURSE PRACTITIONER

## 2022-02-01 PROCEDURE — 99999 PR PBB SHADOW E&M-EST. PATIENT-LVL III: CPT | Mod: PBBFAC,,, | Performed by: NURSE PRACTITIONER

## 2022-02-01 PROCEDURE — 1160F RVW MEDS BY RX/DR IN RCRD: CPT | Mod: CPTII,S$GLB,, | Performed by: NURSE PRACTITIONER

## 2022-02-07 ENCOUNTER — OFFICE VISIT (OUTPATIENT)
Dept: NEUROSURGERY | Facility: CLINIC | Age: 57
End: 2022-02-07
Payer: COMMERCIAL

## 2022-02-07 VITALS
SYSTOLIC BLOOD PRESSURE: 152 MMHG | HEART RATE: 91 BPM | HEIGHT: 65 IN | TEMPERATURE: 98 F | BODY MASS INDEX: 30.82 KG/M2 | WEIGHT: 185 LBS | DIASTOLIC BLOOD PRESSURE: 60 MMHG

## 2022-02-07 DIAGNOSIS — I73.9 CLAUDICATION: ICD-10-CM

## 2022-02-07 DIAGNOSIS — M54.16 LUMBAR RADICULOPATHY: ICD-10-CM

## 2022-02-07 DIAGNOSIS — M54.41 CHRONIC BILATERAL LOW BACK PAIN WITH RIGHT-SIDED SCIATICA: ICD-10-CM

## 2022-02-07 DIAGNOSIS — F17.200 SMOKER: Primary | ICD-10-CM

## 2022-02-07 DIAGNOSIS — M51.36 ANNULAR TEAR OF LUMBAR DISC: ICD-10-CM

## 2022-02-07 DIAGNOSIS — G89.29 CHRONIC BILATERAL LOW BACK PAIN WITH RIGHT-SIDED SCIATICA: ICD-10-CM

## 2022-02-07 PROCEDURE — 3008F PR BODY MASS INDEX (BMI) DOCUMENTED: ICD-10-PCS | Mod: CPTII,S$GLB,, | Performed by: NEUROLOGICAL SURGERY

## 2022-02-07 PROCEDURE — 99204 OFFICE O/P NEW MOD 45 MIN: CPT | Mod: S$GLB,,, | Performed by: NEUROLOGICAL SURGERY

## 2022-02-07 PROCEDURE — 3008F BODY MASS INDEX DOCD: CPT | Mod: CPTII,S$GLB,, | Performed by: NEUROLOGICAL SURGERY

## 2022-02-07 PROCEDURE — 1159F PR MEDICATION LIST DOCUMENTED IN MEDICAL RECORD: ICD-10-PCS | Mod: CPTII,S$GLB,, | Performed by: NEUROLOGICAL SURGERY

## 2022-02-07 PROCEDURE — 3077F SYST BP >= 140 MM HG: CPT | Mod: CPTII,S$GLB,, | Performed by: NEUROLOGICAL SURGERY

## 2022-02-07 PROCEDURE — 99999 PR PBB SHADOW E&M-EST. PATIENT-LVL IV: ICD-10-PCS | Mod: PBBFAC,,, | Performed by: NEUROLOGICAL SURGERY

## 2022-02-07 PROCEDURE — 1160F RVW MEDS BY RX/DR IN RCRD: CPT | Mod: CPTII,S$GLB,, | Performed by: NEUROLOGICAL SURGERY

## 2022-02-07 PROCEDURE — 99999 PR PBB SHADOW E&M-EST. PATIENT-LVL IV: CPT | Mod: PBBFAC,,, | Performed by: NEUROLOGICAL SURGERY

## 2022-02-07 PROCEDURE — 3078F PR MOST RECENT DIASTOLIC BLOOD PRESSURE < 80 MM HG: ICD-10-PCS | Mod: CPTII,S$GLB,, | Performed by: NEUROLOGICAL SURGERY

## 2022-02-07 PROCEDURE — 3077F PR MOST RECENT SYSTOLIC BLOOD PRESSURE >= 140 MM HG: ICD-10-PCS | Mod: CPTII,S$GLB,, | Performed by: NEUROLOGICAL SURGERY

## 2022-02-07 PROCEDURE — 1160F PR REVIEW ALL MEDS BY PRESCRIBER/CLIN PHARMACIST DOCUMENTED: ICD-10-PCS | Mod: CPTII,S$GLB,, | Performed by: NEUROLOGICAL SURGERY

## 2022-02-07 PROCEDURE — 99204 PR OFFICE/OUTPT VISIT, NEW, LEVL IV, 45-59 MIN: ICD-10-PCS | Mod: S$GLB,,, | Performed by: NEUROLOGICAL SURGERY

## 2022-02-07 PROCEDURE — 3078F DIAST BP <80 MM HG: CPT | Mod: CPTII,S$GLB,, | Performed by: NEUROLOGICAL SURGERY

## 2022-02-07 PROCEDURE — 1159F MED LIST DOCD IN RCRD: CPT | Mod: CPTII,S$GLB,, | Performed by: NEUROLOGICAL SURGERY

## 2022-02-08 NOTE — PROGRESS NOTES
Neurosurgery  History & Physical    SUBJECTIVE:     Chief Complaint:  Right hip pain and right leg pain.    History of Present Illness:  Mr. Colin is a 56-year-old male who is referred to me by KEYONA Littlejohn.  His past medical history is significant for BPH, hernia, hyperlipidemia, hypertension, incarcerated hernia, and smoking.  He admits that he is still currently smoking and has been smoking for 25-30 years.  He complains of a 4-5 month history of right-sided low back pain and right leg pain.  He also complains of right foot paresthesias.  He has no symptoms on the left side.  His leg pain is worse than his low back pain and right hip pain.  The pain is non-existent when he is lying down or sitting down.  However, if he is walking for more than 100 ft, the right leg gets significantly painful down the posterior aspect of the leg all the way to the foot.  The entirety of his right foot becomes numb when he is walking more than 100 ft.  If he stops and rests, the pain improved to the point that he can start walking again.  He does not have to sit down for the pain to improve.  The pain has been getting progressively worse to the point that is affecting his activities of daily living.  He has to walk several blocks to work and he is unable to do so without stopping to rest several times.  He denies any saddle anesthesia.  He denies any bowel or bladder incontinence.  He denies any weakness in his legs.  He did undergo a right L4-5 and L5-S1 epidural steroid injection which brought him 0% relief of his pain.    Review of patient's allergies indicates:  No Known Allergies    Current Outpatient Medications   Medication Sig Dispense Refill    celecoxib (CELEBREX) 200 MG capsule TAKE 1 CAPSULE BY MOUTH 2 TIMES DAILY AS NEEDED FOR PAIN. 60 capsule 0    clotrimazole (LOTRIMIN) 1 % cream Apply topically 2 (two) times daily. 60 g 0    fluticasone propionate (FLONASE) 50 mcg/actuation nasal spray 1 spray (50 mcg total)  by Each Nostril route once daily. 1 Bottle 0    losartan (COZAAR) 25 MG tablet Take 1 tablet (25 mg total) by mouth once daily. 90 tablet 3    pravastatin (PRAVACHOL) 80 MG tablet Take 1 tablet (80 mg total) by mouth once daily. 90 tablet 3    sildenafiL (VIAGRA) 100 MG tablet Take 1 tablet (100 mg total) by mouth daily as needed for Erectile Dysfunction. 30 tablet 2    tamsulosin (FLOMAX) 0.4 mg Cap TAKE 1 CAPSULE BY MOUTH EVERY DAY 90 capsule 3     No current facility-administered medications for this visit.       Past Medical History:   Diagnosis Date    BPH (benign prostatic hypertrophy)     Hernia     Hyperlipidemia     Hypertension, essential 2015    Incarcerated umbilical hernia 2016    Umbilical hernia      Past Surgical History:   Procedure Laterality Date    COLONOSCOPY N/A 2019    Procedure: COLONOSCOPY;  Surgeon: Fritz Franco MD;  [facility]  Service: Endoscopy;  Laterality: N/A;  Do not cancel this order    HERNIA REPAIR      TRANSFORAMINAL EPIDURAL INJECTION OF STEROID Right 12/15/2021    Procedure: Injection,steroid,epidural,transforaminal approach Right L4 & L5 (May change depending MRI);  Surgeon: Stacey Mota MD;  Location: [facility] PAIN MGT;  Service: Pain Management;  Laterality: Right;    VASECTOMY      WISDOM TOOTH EXTRACTION       Family History     Problem Relation (Age of Onset)    COPD Maternal Grandfather, Paternal Aunt    Cancer Maternal Grandfather, Paternal Grandmother    Depression Paternal Uncle    Heart disease Paternal Uncle    Kidney disease Paternal Uncle    Stroke Father        Social History     Socioeconomic History    Marital status:      Spouse name: Lakia    Number of children: 2   Occupational History    Occupation: superintendent     Comment:  [employer]     Employer: [employer]   Tobacco Use    Smoking status: Former Smoker     Types: Cigarettes     Quit date: 10/15/2017     Years since quittin.3  "   Smokeless tobacco: Never Used   Substance and Sexual Activity    Alcohol use: Yes     Alcohol/week: 0.0 standard drinks     Comment: socially    Drug use: No    Sexual activity: Yes     Partners: Female   Social History Narrative    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       Review of Systems   Constitutional: Positive for unexpected weight change. Negative for activity change, diaphoresis, fatigue and fever.   HENT: Positive for hearing loss and tinnitus. Negative for nosebleeds and trouble swallowing.    Eyes: Positive for visual disturbance.   Respiratory: Negative for cough, shortness of breath and wheezing.    Cardiovascular: Negative for chest pain and palpitations.   Gastrointestinal: Negative for abdominal distention, abdominal pain, blood in stool, constipation, diarrhea, nausea and vomiting.   Endocrine: Negative for cold intolerance and heat intolerance.   Genitourinary: Positive for difficulty urinating. Negative for enuresis, frequency and urgency.   Musculoskeletal: Positive for back pain. Negative for gait problem, joint swelling, myalgias, neck pain and neck stiffness.   Skin: Negative for color change, rash and wound.   Allergic/Immunologic: Negative for environmental allergies and food allergies.   Neurological: Positive for numbness. Negative for dizziness, seizures, facial asymmetry, speech difficulty, weakness, light-headedness and headaches.   Hematological: Does not bruise/bleed easily.   Psychiatric/Behavioral: Negative for agitation, behavioral problems, dysphoric mood and hallucinations. The patient is not nervous/anxious.        OBJECTIVE:     Vital Signs  Temp: 98 °F (36.7 °C)  Pulse: 91  BP: (!) 152/60  Pain Score:   9  Height: 5' 5" (165.1 cm)  Weight: 83.9 kg (185 lb)  Body mass index is 30.79 kg/m².      Physical Exam:  Vitals reviewed.    Constitutional: He appears well-developed and well-nourished. No distress.     Eyes: Pupils are equal, round, and reactive to light. " Conjunctivae and EOM are normal.     Cardiovascular: Normal rate, regular rhythm, normal pulses and no edema.     Abdominal: Soft. Bowel sounds are normal.     Skin: Skin displays no rash on trunk and no rash on extremities. Skin displays no lesions on trunk and no lesions on extremities.     Psych/Behavior: He is alert. He is oriented to person, place, and time. He has a normal mood and affect.     Musculoskeletal: Gait is normal.        Neck: Range of motion is full. There is no tenderness. Muscle strength is 5/5. Tone is normal.        Back: Range of motion is full. There is no tenderness. Muscle strength is 5/5. Tone is normal.        Right Upper Extremities: Range of motion is full. There is no tenderness. Muscle strength is 5/5. Tone is normal.        Left Upper Extremities: Range of motion is full. There is no tenderness. Muscle strength is 5/5. Tone is normal.       Right Lower Extremities: Range of motion is full. There is no tenderness. Muscle strength is 5/5. Tone is normal.        Left Lower Extremities: Range of motion is full. There is no tenderness. Muscle strength is 5/5. Tone is normal.     Neurological:        Coordination: He has a normal Romberg Test, normal finger to nose coordination and normal tandem walking coordination.        Sensory: There is no sensory deficit in the trunk. There is no sensory deficit in the extremities.        DTRs: DTRs are DTRS NORMAL AND SYMMETRICnormal and symmetric. He displays no Babinski's sign on the right side. He displays no Babinski's sign on the left side.        Cranial nerves: Cranial nerve(s) II, III, IV, V, VI, VII, VIII, IX, X, XI and XII are intact.         Diagnostic Results:  He has an MRI of the lumbar spine available for review which I personally reviewed.  This shows normal alignment of the lumbar spine.  There is mild lumbar spondylosis at the L4-5 and L5-S1 levels.  At L4-5, there is severe facet arthropathy.  There is a mild disc bulge more  eccentric to the right.  There is no significant central canal stenosis but there is mild right neural foraminal narrowing at the L4-5 level.  At L5-S1, there is broad-based disc bulge without significant central canal narrowing or neural foraminal narrowing.    ASSESSMENT/PLAN:     Mr. Colin is a 56-year-old male with a 4-5 month history of mainly right leg pain in right foot paresthesias as described above.  His imaging studies are as described above.  While he does have some lumbar spondylosis worse at the L4-5 level, there is not significant right L4-5 neural foraminal narrowing.  Furthermore, he did not respond at all to right L4-5 and L5-S1 transforaminal epidural steroid injections.  Given his radiographic findings as well as lack of response to epidural steroid injections, I am not convinced that the patient's right leg pain is due to a lumbar radiculopathy.  The quality of his pain is actually very much consistent with a vascular claudication as the pain significantly improves with rest.  Given his smoking history I believe that the patient is at risk for vascular claudication.  I would like him to see vascular surgery to rule out vascular claudication as a reason for his right leg pain.  At this point, I will see the patient on an as-needed basis.  He knows he can call with any further questions or concerns in the meantime.  If the vascular workup is unrevealing, the next step would be to get an EMG of the lower extremities.        Note dictated with voice recognition software, please excuse any grammatical errors.

## 2022-02-09 DIAGNOSIS — Z12.11 COLON CANCER SCREENING: Primary | ICD-10-CM

## 2022-02-14 ENCOUNTER — LAB VISIT (OUTPATIENT)
Dept: LAB | Facility: HOSPITAL | Age: 57
End: 2022-02-14
Attending: SURGERY
Payer: COMMERCIAL

## 2022-02-14 ENCOUNTER — INITIAL CONSULT (OUTPATIENT)
Dept: VASCULAR SURGERY | Facility: CLINIC | Age: 57
End: 2022-02-14
Payer: COMMERCIAL

## 2022-02-14 ENCOUNTER — TELEPHONE (OUTPATIENT)
Dept: VASCULAR SURGERY | Facility: CLINIC | Age: 57
End: 2022-02-14
Payer: COMMERCIAL

## 2022-02-14 ENCOUNTER — HOSPITAL ENCOUNTER (OUTPATIENT)
Dept: VASCULAR SURGERY | Facility: CLINIC | Age: 57
Discharge: HOME | End: 2022-02-14
Attending: SURGERY
Payer: COMMERCIAL

## 2022-02-14 VITALS
SYSTOLIC BLOOD PRESSURE: 140 MMHG | TEMPERATURE: 98 F | WEIGHT: 200.63 LBS | HEIGHT: 65 IN | DIASTOLIC BLOOD PRESSURE: 87 MMHG | BODY MASS INDEX: 33.43 KG/M2 | HEART RATE: 90 BPM

## 2022-02-14 DIAGNOSIS — I73.9 PERIPHERAL VASCULAR DISEASE, UNSPECIFIED: ICD-10-CM

## 2022-02-14 DIAGNOSIS — I73.9 PAD (PERIPHERAL ARTERY DISEASE): ICD-10-CM

## 2022-02-14 DIAGNOSIS — M54.16 LUMBAR RADICULOPATHY: ICD-10-CM

## 2022-02-14 DIAGNOSIS — I73.9 PAD (PERIPHERAL ARTERY DISEASE): Primary | ICD-10-CM

## 2022-02-14 DIAGNOSIS — I73.9 CLAUDICATION: Primary | ICD-10-CM

## 2022-02-14 DIAGNOSIS — I73.9 CLAUDICATION: ICD-10-CM

## 2022-02-14 DIAGNOSIS — Z01.818 PRE-OP EVALUATION: ICD-10-CM

## 2022-02-14 DIAGNOSIS — Z01.818 PRE-OP EVALUATION: Primary | ICD-10-CM

## 2022-02-14 DIAGNOSIS — M79.604 RIGHT LEG PAIN: ICD-10-CM

## 2022-02-14 DIAGNOSIS — M51.36 ANNULAR TEAR OF LUMBAR DISC: ICD-10-CM

## 2022-02-14 LAB
CREAT SERPL-MCNC: 0.8 MG/DL (ref 0.5–1.4)
EST. GFR  (AFRICAN AMERICAN): >60 ML/MIN/1.73 M^2
EST. GFR  (NON AFRICAN AMERICAN): >60 ML/MIN/1.73 M^2

## 2022-02-14 PROCEDURE — 82565 ASSAY OF CREATININE: CPT | Performed by: SURGERY

## 2022-02-14 PROCEDURE — 99203 OFFICE O/P NEW LOW 30 MIN: CPT | Mod: S$GLB,,, | Performed by: SURGERY

## 2022-02-14 PROCEDURE — 93923 PR NON-INVASIVE PHYSIOLOGIC STUDY EXTREMITY 3 LEVELS: ICD-10-PCS | Mod: S$GLB,,, | Performed by: SURGERY

## 2022-02-14 PROCEDURE — 99203 PR OFFICE/OUTPT VISIT, NEW, LEVL III, 30-44 MIN: ICD-10-PCS | Mod: S$GLB,,, | Performed by: SURGERY

## 2022-02-14 PROCEDURE — 99999 PR PBB SHADOW E&M-EST. PATIENT-LVL IV: ICD-10-PCS | Mod: PBBFAC,,, | Performed by: SURGERY

## 2022-02-14 PROCEDURE — 99999 PR PBB SHADOW E&M-EST. PATIENT-LVL IV: CPT | Mod: PBBFAC,,, | Performed by: SURGERY

## 2022-02-14 PROCEDURE — 36415 COLL VENOUS BLD VENIPUNCTURE: CPT | Performed by: SURGERY

## 2022-02-14 PROCEDURE — 93923 UPR/LXTR ART STDY 3+ LVLS: CPT | Mod: S$GLB,,, | Performed by: SURGERY

## 2022-02-14 RX ORDER — CLOPIDOGREL BISULFATE 75 MG/1
75 TABLET ORAL DAILY
Qty: 30 TABLET | Refills: 11 | Status: SHIPPED | OUTPATIENT
Start: 2022-02-14 | End: 2023-02-28

## 2022-02-14 NOTE — TELEPHONE ENCOUNTER
Attempted to contact patient in reference to appt. Voice message left for patient requesting return call.  ----- Message from Ozzy Desai sent at 2/14/2022 10:18 AM CST -----  Patient felt he needed to be seen today, he's coming in @ 3pm

## 2022-02-14 NOTE — PROGRESS NOTES
VASCULAR SURGERY NOTE    Patient ID: Nanda Colin III is a 56 y.o. male.    I. HISTORY     Chief Complaint: Intermittent claudication     HPI: Nanda Colin III is a 56 y.o. male who is here today for new patient initial appointment. Patient's primary complaint is right leg pain, characterized as an aching in the posterior calf and numbness to the foot, when he walks for than 1/2 a block, requiring 3-5 minutes of rest for pain to regress. Patient states also has long standing history of back pain, bulging discs, and OA of his lumbar and sacral spine with no significant resolution of symptoms with recent L4-S1 epidural steroid injections. He is a  and this pain is inhibiting his ability to preform daily activities. Laying and rest seem to be the only alleviating factors.  Patient denies pain at rest. He indicates inability to have and maintain an erection, and also endorses pain in the buttock and lower back. He relays current tobacco use, 1 pack a day, for the last 30 years. Patient denies worsening of numbness in the evening in the evening while at rest. Patient also notes an extensive history of heart attack, stroke, and cancer in his family history.     Review of patient's allergies indicates:  No Known Allergies    Past Medical History:   Diagnosis Date    BPH (benign prostatic hypertrophy)     Hernia     Hyperlipidemia     Hypertension, essential 12/30/2015    Incarcerated umbilical hernia 6/25/2016    Umbilical hernia      Family History   Problem Relation Age of Onset    Stroke Father     Heart disease Paternal Uncle     Kidney disease Paternal Uncle     Depression Paternal Uncle     Cancer Maternal Grandfather     COPD Maternal Grandfather     Cancer Paternal Grandmother     COPD Paternal Aunt      Social History     Occupational History    Occupation: superintendent     Comment:  [employer]     Employer: [employer]   Tobacco Use    Smoking status: Former  Smoker     Types: Cigarettes     Quit date: 10/15/2017     Years since quittin.3    Smokeless tobacco: Never Used   Substance and Sexual Activity    Alcohol use: Yes     Alcohol/week: 0.0 standard drinks     Comment: socially    Drug use: No    Sexual activity: Yes     Partners: Female       Current Outpatient Medications on File Prior to Visit   Medication Sig Dispense Refill    losartan (COZAAR) 25 MG tablet Take 1 tablet (25 mg total) by mouth once daily. 90 tablet 3    pravastatin (PRAVACHOL) 80 MG tablet Take 1 tablet (80 mg total) by mouth once daily. 90 tablet 3    sildenafiL (VIAGRA) 100 MG tablet Take 1 tablet (100 mg total) by mouth daily as needed for Erectile Dysfunction. 30 tablet 2    tamsulosin (FLOMAX) 0.4 mg Cap TAKE 1 CAPSULE BY MOUTH EVERY DAY 90 capsule 3    clotrimazole (LOTRIMIN) 1 % cream Apply topically 2 (two) times daily. (Patient not taking: Reported on 2022) 60 g 0    [DISCONTINUED] celecoxib (CELEBREX) 200 MG capsule TAKE 1 CAPSULE BY MOUTH 2 TIMES DAILY AS NEEDED FOR PAIN. 60 capsule 0    [DISCONTINUED] fluticasone propionate (FLONASE) 50 mcg/actuation nasal spray 1 spray (50 mcg total) by Each Nostril route once daily. 1 Bottle 0     No current facility-administered medications on file prior to visit.        Past Surgical History:   Procedure Laterality Date    COLONOSCOPY N/A 2019    Procedure: COLONOSCOPY;  Surgeon: Fritz Franco MD;  Location: [address]);  Service: Endoscopy;  Laterality: N/A;  Do not cancel this order    HERNIA REPAIR      TRANSFORAMINAL EPIDURAL INJECTION OF STEROID Right 12/15/2021    Procedure: Injection,steroid,epidural,transforaminal approach Right L4 & L5 (May change depending MRI);  Surgeon: Stacey Mota MD;  Location: [facility];  Service: Pain Management;  Laterality: Right;    VASECTOMY      WISDOM TOOTH EXTRACTION         Review of Systems   Constitutional: Negative for chills, decreased appetite,  diaphoresis, fever, weight gain and weight loss.   HENT: Negative for ear pain and nosebleeds.    Eyes: Negative for discharge and pain.   Cardiovascular: Negative for chest pain, dyspnea on exertion, leg swelling and orthopnea.   Respiratory: Negative for cough, shortness of breath and wheezing.    Skin: Negative for rash and suspicious lesions.   Musculoskeletal: Positive for arthritis, back pain, joint pain and muscle cramps.   Gastrointestinal: Negative for abdominal pain, diarrhea, nausea and vomiting.   Genitourinary: Negative for dysuria.   Neurological: Positive for numbness. Negative for paresthesias.   Psychiatric/Behavioral: Negative for altered mental status.         II. PHYSICAL EXAM     Physical Exam  Constitutional:       General: He is not in acute distress.     Appearance: Normal appearance. He is obese. He is not ill-appearing.   HENT:      Head: Normocephalic and atraumatic.   Eyes:      Extraocular Movements: Extraocular movements intact.      Pupils: Pupils are equal, round, and reactive to light.   Cardiovascular:      Rate and Rhythm: Normal rate.      Pulses:           Femoral pulses are 0 on the right side and 2+ on the left side.       Dorsalis pedis pulses are detected w/ Doppler on the right side and 2+ on the left side.        Posterior tibial pulses are detected w/ Doppler on the right side and 2+ on the left side.   Pulmonary:      Effort: Pulmonary effort is normal. No respiratory distress.      Breath sounds: No wheezing.   Abdominal:      General: Abdomen is flat. There is no distension.      Palpations: Abdomen is soft.      Tenderness: There is no abdominal tenderness.   Musculoskeletal:         General: Tenderness present. No swelling or deformity.      Cervical back: Normal range of motion.      Right lower leg: No edema.      Left lower leg: No edema.   Feet:      Right foot:      Skin integrity: No ulcer or callus.      Toenail Condition: Right toenails are long.      Left  foot:      Skin integrity: No ulcer or callus.      Toenail Condition: Left toenails are long.   Skin:     Findings: No erythema, lesion or rash.   Neurological:      General: No focal deficit present.      Mental Status: He is alert and oriented to person, place, and time.      Motor: No weakness.      Gait: Gait normal.   Psychiatric:         Mood and Affect: Mood normal.         Behavior: Behavior normal.       III. ASSESSMENT & PLAN (MEDICAL DECISION MAKING)       Imaging Results: (I have personally reviewed all images and provided interpretation below)    ABIs 02/14/22:   Right - 1.19; w/ normal waveforms   Left - 0.68; w/ blunted waveforms       Assessment/Diagnosis and Plan:    1. Lumbar radiculopathy    2. Annular tear of lumbar disc    3. Claudication    4. Peripheral vascular disease, unspecified      Diff Dx: Leriche's syndrome    56 y.o. male with symptoms consistent with RLE claudication complicated by radiculopathy. Doppler signal to PT was lost shortly after stress test, shortly after re-emerged.     - Counseled patient on the importance of smoking cessation, especially now in the setting of RLE PAD.   - Aspirin 81 mg PO daily  - Plavix 75 mg PO daily   - Continue statin   - CTA abdominal pelvis with b/l LE runoff scheduled  - Patient to follow up in clinic after completing CTA for definitive treatment plan   - Instructed patient to take precaution of RLE, wound healing would likely be stunted.   - Instructed patient not to clip his own toe nails, wear white socks, do not ambulate bear foot, and examine his RLE regularly for lesions.     Chadwick Sanders DPM, PGY-1

## 2022-02-14 NOTE — TELEPHONE ENCOUNTER
Pt returning nurse's call. Explained that patient will need REGLA piror to visit with Dr. Bay today. Appointment scheduled, pt verified.

## 2022-03-03 ENCOUNTER — HOSPITAL ENCOUNTER (OUTPATIENT)
Dept: RADIOLOGY | Facility: HOSPITAL | Age: 57
Discharge: HOME | End: 2022-03-03
Attending: SURGERY
Payer: COMMERCIAL

## 2022-03-03 DIAGNOSIS — I73.9 PERIPHERAL VASCULAR DISEASE, UNSPECIFIED: ICD-10-CM

## 2022-03-03 DIAGNOSIS — I73.9 PAD (PERIPHERAL ARTERY DISEASE): ICD-10-CM

## 2022-03-03 PROCEDURE — 25500020 PHARM REV CODE 255: Performed by: SURGERY

## 2022-03-03 PROCEDURE — 75635 CTA RUNOFF ABD PEL BILAT LOWER EXT: ICD-10-PCS | Mod: 26,,, | Performed by: RADIOLOGY

## 2022-03-03 PROCEDURE — 75635 CT ANGIO ABDOMINAL ARTERIES: CPT | Mod: TC

## 2022-03-03 PROCEDURE — 75635 CT ANGIO ABDOMINAL ARTERIES: CPT | Mod: 26,,, | Performed by: RADIOLOGY

## 2022-03-03 RX ADMIN — IOHEXOL 150 ML: 350 INJECTION, SOLUTION INTRAVENOUS at 08:03

## 2022-03-07 ENCOUNTER — OFFICE VISIT (OUTPATIENT)
Dept: VASCULAR SURGERY | Facility: CLINIC | Age: 57
End: 2022-03-07
Payer: COMMERCIAL

## 2022-03-07 VITALS
WEIGHT: 198.44 LBS | DIASTOLIC BLOOD PRESSURE: 75 MMHG | HEIGHT: 65 IN | TEMPERATURE: 99 F | HEART RATE: 95 BPM | BODY MASS INDEX: 33.06 KG/M2 | SYSTOLIC BLOOD PRESSURE: 128 MMHG

## 2022-03-07 DIAGNOSIS — Z01.818 PRE-OP EVALUATION: ICD-10-CM

## 2022-03-07 DIAGNOSIS — I73.9 PERIPHERAL VASCULAR DISEASE, UNSPECIFIED: Primary | ICD-10-CM

## 2022-03-07 DIAGNOSIS — I74.5 OCCLUSION OF RIGHT ILIAC ARTERY: Primary | ICD-10-CM

## 2022-03-07 PROCEDURE — 99999 PR PBB SHADOW E&M-EST. PATIENT-LVL III: CPT | Mod: PBBFAC,,, | Performed by: SURGERY

## 2022-03-07 PROCEDURE — 99214 PR OFFICE/OUTPT VISIT, EST, LEVL IV, 30-39 MIN: ICD-10-PCS | Mod: S$GLB,,, | Performed by: SURGERY

## 2022-03-07 PROCEDURE — 3078F PR MOST RECENT DIASTOLIC BLOOD PRESSURE < 80 MM HG: ICD-10-PCS | Mod: CPTII,S$GLB,, | Performed by: SURGERY

## 2022-03-07 PROCEDURE — 3078F DIAST BP <80 MM HG: CPT | Mod: CPTII,S$GLB,, | Performed by: SURGERY

## 2022-03-07 PROCEDURE — 3008F PR BODY MASS INDEX (BMI) DOCUMENTED: ICD-10-PCS | Mod: CPTII,S$GLB,, | Performed by: SURGERY

## 2022-03-07 PROCEDURE — 99999 PR PBB SHADOW E&M-EST. PATIENT-LVL III: ICD-10-PCS | Mod: PBBFAC,,, | Performed by: SURGERY

## 2022-03-07 PROCEDURE — 3008F BODY MASS INDEX DOCD: CPT | Mod: CPTII,S$GLB,, | Performed by: SURGERY

## 2022-03-07 PROCEDURE — 3074F PR MOST RECENT SYSTOLIC BLOOD PRESSURE < 130 MM HG: ICD-10-PCS | Mod: CPTII,S$GLB,, | Performed by: SURGERY

## 2022-03-07 PROCEDURE — 3074F SYST BP LT 130 MM HG: CPT | Mod: CPTII,S$GLB,, | Performed by: SURGERY

## 2022-03-07 PROCEDURE — 99214 OFFICE O/P EST MOD 30 MIN: CPT | Mod: S$GLB,,, | Performed by: SURGERY

## 2022-03-07 NOTE — PROGRESS NOTES
VASCULAR SURGERY NOTE    Patient ID: Nanda Colin III is a 56 y.o. male.    I. HISTORY     Chief Complaint: Intermittent claudication     HPI: Nanda Colin III is a 56 y.o. male who is here today for follow up appointment. Patient's primary complaint is right leg pain, characterized as an aching in the posterior calf and numbness to the foot, when he walks for than 1/2 a block, requiring 3-5 minutes of rest for pain to regress. Patient states also has long standing history of back pain, bulging discs, and OA of his lumbar and sacral spine with no significant resolution of symptoms with recent L4-S1 epidural steroid injections. He is a  and this pain is inhibiting his ability to preform daily activities. Laying and rest seem to be the only alleviating factors.  Patient denies pain at rest. He indicates inability to have and maintain an erection, and also endorses pain in the buttock and lower back. He relays current tobacco use, 1 pack a day, for the last 30 years. Patient denies worsening of numbness in the evening in the evening while at rest. Patient also notes an extensive history of heart attack, stroke, and cancer in his family history. He presents in clinic today with new imaging to discuss surgical options.     Review of patient's allergies indicates:  Not on File    Past Medical History:   Diagnosis Date    BPH (benign prostatic hypertrophy)     Hernia     Hyperlipidemia     Hypertension, essential 12/30/2015    Incarcerated umbilical hernia 6/25/2016    Umbilical hernia      Family History   Problem Relation Age of Onset    Stroke Father     Heart disease Paternal Uncle     Kidney disease Paternal Uncle     Depression Paternal Uncle     Cancer Maternal Grandfather     COPD Maternal Grandfather     Cancer Paternal Grandmother     COPD Paternal Aunt      Social History     Occupational History    Occupation: superintendent     Comment:  [employer]     Employer: [employer]   Tobacco Use    Smoking status: Former Smoker     Types: Cigarettes     Quit date: 10/15/2017     Years since quittin.3    Smokeless tobacco: Never Used   Substance and Sexual Activity    Alcohol use: Yes     Alcohol/week: 0.0 standard drinks     Comment: socially    Drug use: No    Sexual activity: Yes     Partners: Female       Current Outpatient Medications on File Prior to Visit   Medication Sig Dispense Refill    clopidogreL (PLAVIX) 75 mg tablet Take 1 tablet (75 mg total) by mouth once daily. 30 tablet 11    clotrimazole (LOTRIMIN) 1 % cream Apply topically 2 (two) times daily. (Patient not taking: Reported on 2022) 60 g 0    losartan (COZAAR) 25 MG tablet Take 1 tablet (25 mg total) by mouth once daily. 90 tablet 3    pravastatin (PRAVACHOL) 80 MG tablet Take 1 tablet (80 mg total) by mouth once daily. 90 tablet 3    sildenafiL (VIAGRA) 100 MG tablet Take 1 tablet (100 mg total) by mouth daily as needed for Erectile Dysfunction. 30 tablet 2    tamsulosin (FLOMAX) 0.4 mg Cap TAKE 1 CAPSULE BY MOUTH EVERY DAY 90 capsule 3     No current facility-administered medications on file prior to visit.        Past Surgical History:   Procedure Laterality Date    COLONOSCOPY N/A 2019    Procedure: COLONOSCOPY;  Surgeon: Fritz Franco MD;  Location: [address]);  Service: Endoscopy;  Laterality: N/A;  Do not cancel this order    HERNIA REPAIR      TRANSFORAMINAL EPIDURAL INJECTION OF STEROID Right 12/15/2021    Procedure: Injection,steroid,epidural,transforaminal approach Right L4 & L5 (May change depending MRI);  Surgeon: Stacey Mota MD;  Location: [facility];  Service: Pain Management;  Laterality: Right;    VASECTOMY      WISDOM TOOTH EXTRACTION         Review of Systems   Constitutional: Negative for chills, decreased appetite, diaphoresis, fever, weight gain and weight loss.   HENT: Negative for ear pain and nosebleeds.    Eyes: Negative for discharge  and pain.   Cardiovascular: Negative for chest pain, dyspnea on exertion, leg swelling and orthopnea.   Respiratory: Negative for cough, shortness of breath and wheezing.    Skin: Negative for rash and suspicious lesions.   Musculoskeletal: Positive for arthritis, back pain, joint pain and muscle cramps.   Gastrointestinal: Negative for abdominal pain, diarrhea, nausea and vomiting.   Genitourinary: Negative for dysuria.   Neurological: Positive for numbness. Negative for paresthesias.   Psychiatric/Behavioral: Negative for altered mental status.         II. PHYSICAL EXAM     Physical Exam  Constitutional:       General: He is not in acute distress.     Appearance: Normal appearance. He is obese. He is not ill-appearing.   HENT:      Head: Normocephalic and atraumatic.   Eyes:      Extraocular Movements: Extraocular movements intact.      Pupils: Pupils are equal, round, and reactive to light.   Cardiovascular:      Rate and Rhythm: Normal rate.      Pulses:           Femoral pulses are 0 on the right side and 2+ on the left side.       Dorsalis pedis pulses are detected w/ Doppler on the right side and 2+ on the left side.        Posterior tibial pulses are detected w/ Doppler on the right side and 2+ on the left side.   Pulmonary:      Effort: Pulmonary effort is normal. No respiratory distress.      Breath sounds: No wheezing.   Abdominal:      General: Abdomen is flat. There is no distension.      Palpations: Abdomen is soft.      Tenderness: There is no abdominal tenderness.   Musculoskeletal:         General: Tenderness present. No swelling or deformity.      Cervical back: Normal range of motion.      Right lower leg: No edema.      Left lower leg: No edema.   Feet:      Right foot:      Skin integrity: No ulcer or callus.      Toenail Condition: Right toenails are long.      Left foot:      Skin integrity: No ulcer or callus.      Toenail Condition: Left toenails are long.   Skin:     Findings: No erythema,  lesion or rash.   Neurological:      General: No focal deficit present.      Mental Status: He is alert and oriented to person, place, and time.      Motor: No weakness.      Gait: Gait normal.   Psychiatric:         Mood and Affect: Mood normal.         Behavior: Behavior normal.       III. ASSESSMENT & PLAN (MEDICAL DECISION MAKING)       Imaging Results: (I have personally reviewed all images and provided interpretation below)    ABIs 02/14/22:   Right - 1.19; w/ normal waveforms   Left - 0.68; w/ blunted waveforms     CTA 3/3/22:  Impression:     1. 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  Otherwise no significant atherosclerotic disease in the lower extremities as detailed above.  2. Enlarged prostate.    Assessment/Diagnosis and Plan:      56 y.o. male with symptoms consistent with RLE claudication complicated by radiculopathy:  CTA reviewed.  I discussed the findings with the patient.  At this point he would probably benefit from an aortogram runoff possible PTA possible stent.  Right iliac common iliac is what is involved.  I have explained to him how your pelvis got a 90% chance of success.  I have told him we would do a PTA and stent possible bilateral stents.  All of his questions were answered the risk of bleeding and failure were discussed with the patient plan the procedure for next week    - Plan for endovascular revascularization  - Counseled patient on the importance of smoking cessation, especially now in the setting of RLE PAD.   - Aspirin 81 mg PO daily  - Plavix 75 mg PO daily   - Continue statin

## 2022-03-07 NOTE — H&P (VIEW-ONLY)
VASCULAR SURGERY NOTE    Patient ID: Nanda Colin III is a 56 y.o. male.    I. HISTORY     Chief Complaint: Intermittent claudication     HPI: Nanda Colin III is a 56 y.o. male who is here today for follow up appointment. Patient's primary complaint is right leg pain, characterized as an aching in the posterior calf and numbness to the foot, when he walks for than 1/2 a block, requiring 3-5 minutes of rest for pain to regress. Patient states also has long standing history of back pain, bulging discs, and OA of his lumbar and sacral spine with no significant resolution of symptoms with recent L4-S1 epidural steroid injections. He is a  and this pain is inhibiting his ability to preform daily activities. Laying and rest seem to be the only alleviating factors.  Patient denies pain at rest. He indicates inability to have and maintain an erection, and also endorses pain in the buttock and lower back. He relays current tobacco use, 1 pack a day, for the last 30 years. Patient denies worsening of numbness in the evening in the evening while at rest. Patient also notes an extensive history of heart attack, stroke, and cancer in his family history. He presents in clinic today with new imaging to discuss surgical options.     Review of patient's allergies indicates:  Not on File    Past Medical History:   Diagnosis Date    BPH (benign prostatic hypertrophy)     Hernia     Hyperlipidemia     Hypertension, essential 12/30/2015    Incarcerated umbilical hernia 6/25/2016    Umbilical hernia      Family History   Problem Relation Age of Onset    Stroke Father     Heart disease Paternal Uncle     Kidney disease Paternal Uncle     Depression Paternal Uncle     Cancer Maternal Grandfather     COPD Maternal Grandfather     Cancer Paternal Grandmother     COPD Paternal Aunt      Social History     Occupational History    Occupation: superintendent     Comment:  [employer]     Employer: [employer]   Tobacco Use    Smoking status: Former Smoker     Types: Cigarettes     Quit date: 10/15/2017     Years since quittin.3    Smokeless tobacco: Never Used   Substance and Sexual Activity    Alcohol use: Yes     Alcohol/week: 0.0 standard drinks     Comment: socially    Drug use: No    Sexual activity: Yes     Partners: Female       Current Outpatient Medications on File Prior to Visit   Medication Sig Dispense Refill    clopidogreL (PLAVIX) 75 mg tablet Take 1 tablet (75 mg total) by mouth once daily. 30 tablet 11    clotrimazole (LOTRIMIN) 1 % cream Apply topically 2 (two) times daily. (Patient not taking: Reported on 2022) 60 g 0    losartan (COZAAR) 25 MG tablet Take 1 tablet (25 mg total) by mouth once daily. 90 tablet 3    pravastatin (PRAVACHOL) 80 MG tablet Take 1 tablet (80 mg total) by mouth once daily. 90 tablet 3    sildenafiL (VIAGRA) 100 MG tablet Take 1 tablet (100 mg total) by mouth daily as needed for Erectile Dysfunction. 30 tablet 2    tamsulosin (FLOMAX) 0.4 mg Cap TAKE 1 CAPSULE BY MOUTH EVERY DAY 90 capsule 3     No current facility-administered medications on file prior to visit.        Past Surgical History:   Procedure Laterality Date    COLONOSCOPY N/A 2019    Procedure: COLONOSCOPY;  Surgeon: Fritz Franco MD;  Location: [address]);  Service: Endoscopy;  Laterality: N/A;  Do not cancel this order    HERNIA REPAIR      TRANSFORAMINAL EPIDURAL INJECTION OF STEROID Right 12/15/2021    Procedure: Injection,steroid,epidural,transforaminal approach Right L4 & L5 (May change depending MRI);  Surgeon: Stacey Mota MD;  Location: [facility];  Service: Pain Management;  Laterality: Right;    VASECTOMY      WISDOM TOOTH EXTRACTION         Review of Systems   Constitutional: Negative for chills, decreased appetite, diaphoresis, fever, weight gain and weight loss.   HENT: Negative for ear pain and nosebleeds.    Eyes: Negative for discharge  and pain.   Cardiovascular: Negative for chest pain, dyspnea on exertion, leg swelling and orthopnea.   Respiratory: Negative for cough, shortness of breath and wheezing.    Skin: Negative for rash and suspicious lesions.   Musculoskeletal: Positive for arthritis, back pain, joint pain and muscle cramps.   Gastrointestinal: Negative for abdominal pain, diarrhea, nausea and vomiting.   Genitourinary: Negative for dysuria.   Neurological: Positive for numbness. Negative for paresthesias.   Psychiatric/Behavioral: Negative for altered mental status.         II. PHYSICAL EXAM     Physical Exam  Constitutional:       General: He is not in acute distress.     Appearance: Normal appearance. He is obese. He is not ill-appearing.   HENT:      Head: Normocephalic and atraumatic.   Eyes:      Extraocular Movements: Extraocular movements intact.      Pupils: Pupils are equal, round, and reactive to light.   Cardiovascular:      Rate and Rhythm: Normal rate.      Pulses:           Femoral pulses are 0 on the right side and 2+ on the left side.       Dorsalis pedis pulses are detected w/ Doppler on the right side and 2+ on the left side.        Posterior tibial pulses are detected w/ Doppler on the right side and 2+ on the left side.   Pulmonary:      Effort: Pulmonary effort is normal. No respiratory distress.      Breath sounds: No wheezing.   Abdominal:      General: Abdomen is flat. There is no distension.      Palpations: Abdomen is soft.      Tenderness: There is no abdominal tenderness.   Musculoskeletal:         General: Tenderness present. No swelling or deformity.      Cervical back: Normal range of motion.      Right lower leg: No edema.      Left lower leg: No edema.   Feet:      Right foot:      Skin integrity: No ulcer or callus.      Toenail Condition: Right toenails are long.      Left foot:      Skin integrity: No ulcer or callus.      Toenail Condition: Left toenails are long.   Skin:     Findings: No erythema,  lesion or rash.   Neurological:      General: No focal deficit present.      Mental Status: He is alert and oriented to person, place, and time.      Motor: No weakness.      Gait: Gait normal.   Psychiatric:         Mood and Affect: Mood normal.         Behavior: Behavior normal.       III. ASSESSMENT & PLAN (MEDICAL DECISION MAKING)       Imaging Results: (I have personally reviewed all images and provided interpretation below)    ABIs 02/14/22:   Right - 1.19; w/ normal waveforms   Left - 0.68; w/ blunted waveforms     CTA 3/3/22:  Impression:     1. 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  Otherwise no significant atherosclerotic disease in the lower extremities as detailed above.  2. Enlarged prostate.    Assessment/Diagnosis and Plan:      56 y.o. male with symptoms consistent with RLE claudication complicated by radiculopathy:  CTA reviewed.  I discussed the findings with the patient.  At this point he would probably benefit from an aortogram runoff possible PTA possible stent.  Right iliac common iliac is what is involved.  I have explained to him how your pelvis got a 90% chance of success.  I have told him we would do a PTA and stent possible bilateral stents.  All of his questions were answered the risk of bleeding and failure were discussed with the patient plan the procedure for next week    - Plan for endovascular revascularization  - Counseled patient on the importance of smoking cessation, especially now in the setting of RLE PAD.   - Aspirin 81 mg PO daily  - Plavix 75 mg PO daily   - Continue statin

## 2022-03-12 ENCOUNTER — LAB VISIT (OUTPATIENT)
Dept: PRIMARY CARE CLINIC | Facility: CLINIC | Age: 57
End: 2022-03-12
Payer: COMMERCIAL

## 2022-03-12 DIAGNOSIS — Z01.818 PRE-OP EVALUATION: ICD-10-CM

## 2022-03-12 LAB
SARS-COV-2 RNA RESP QL NAA+PROBE: NOT DETECTED
SARS-COV-2- CYCLE NUMBER: NORMAL

## 2022-03-12 PROCEDURE — U0005 INFEC AGEN DETEC AMPLI PROBE: HCPCS | Performed by: SURGERY

## 2022-03-12 PROCEDURE — U0003 INFECTIOUS AGENT DETECTION BY NUCLEIC ACID (DNA OR RNA); SEVERE ACUTE RESPIRATORY SYNDROME CORONAVIRUS 2 (SARS-COV-2) (CORONAVIRUS DISEASE [COVID-19]), AMPLIFIED PROBE TECHNIQUE, MAKING USE OF HIGH THROUGHPUT TECHNOLOGIES AS DESCRIBED BY CMS-2020-01-R: HCPCS | Performed by: SURGERY

## 2022-03-14 ENCOUNTER — ANESTHESIA EVENT (OUTPATIENT)
Dept: SURGERY | Facility: HOSPITAL | Age: 57
End: 2022-03-14
Payer: COMMERCIAL

## 2022-03-14 ENCOUNTER — TELEPHONE (OUTPATIENT)
Dept: VASCULAR SURGERY | Facility: CLINIC | Age: 57
End: 2022-03-14
Payer: COMMERCIAL

## 2022-03-14 RX ORDER — ASPIRIN 81 MG/1
81 TABLET ORAL
COMMUNITY

## 2022-03-14 NOTE — ANESTHESIA PREPROCEDURE EVALUATION
03/14/2022  [facility]  Anesthesia Pre-Operative Evaluation         Patient Name: Nanda Colin III  YOB: 1965  MRN: [id number]    SUBJECTIVE:     Pre-operative evaluation for Procedure(s) (LRB):  AORTOGRAM, WITH SERIALOGRAPHY (N/A)     03/14/2022    Nanda Colin III is a 56 y.o. male w/ a significant PMHx of PAD, HTN, GERD, and PSA.   Patient was recently evaluated by Dr Bay for RLE claudication complicated by radiculopathy.  Patient now presents for the above procedure(s).      Prev airway:   06/26/16; Placement Time: 0849; Method of Intubation: Direct laryngoscopy; Inserted by: CRNA; Airway Device: Endotracheal Tube; Mask Ventilation: Easy; Intubated: Postinduction; Blade: Argelia #3; Airway Device Size: 8.0; Style: Cuffed; Cuff Inflation: Minimal occlusive pressure; Inflation Amount: 8; Placement Verified By: Auscultation, Capnometry; Grade: Grade I; Complicating Factors: None    Patient Active Problem List   Diagnosis    Erectile dysfunction    Smoker    Hypertension, essential    Hyperlipidemia    Colon polyps    BPH with urinary obstruction    Gastroesophageal reflux disease    GINNA (obstructive sleep apnea)    Family history of prostate cancer    Laceration of left hand without foreign body    Lumbar radiculopathy    PAD (peripheral artery disease)    Right leg pain       Review of patient's allergies indicates:  No Known Allergies    Current Inpatient Medications:      No current facility-administered medications on file prior to encounter.     Current Outpatient Medications on File Prior to Encounter   Medication Sig Dispense Refill    aspirin (ECOTRIN) 81 MG EC tablet Take 81 mg by mouth.      clopidogreL (PLAVIX) 75 mg tablet Take 1 tablet (75 mg total) by mouth once daily. 30 tablet 11    losartan (COZAAR) 25 MG tablet Take 1 tablet (25 mg  total) by mouth once daily. 90 tablet 3    pravastatin (PRAVACHOL) 80 MG tablet Take 1 tablet (80 mg total) by mouth once daily. 90 tablet 3    tamsulosin (FLOMAX) 0.4 mg Cap TAKE 1 CAPSULE BY MOUTH EVERY DAY 90 capsule 3    clotrimazole (LOTRIMIN) 1 % cream Apply topically 2 (two) times daily. 60 g 0    sildenafiL (VIAGRA) 100 MG tablet Take 1 tablet (100 mg total) by mouth daily as needed for Erectile Dysfunction. 30 tablet 2       Past Surgical History:   Procedure Laterality Date    COLONOSCOPY N/A 2019    Procedure: COLONOSCOPY;  Surgeon: Fritz Franco MD;  Location: [facility] ENDO ([address] [place] [address]);  Service: Endoscopy;  Laterality: N/A;  Do not cancel this order    HERNIA REPAIR      TRANSFORAMINAL EPIDURAL INJECTION OF STEROID Right 12/15/2021    Procedure: Injection,steroid,epidural,transforaminal approach Right L4 & L5 (May change depending MRI);  Surgeon: Stacey Mota MD;  Location: [facility];  Service: Pain Management;  Laterality: Right;    VASECTOMY      WISDOM TOOTH EXTRACTION         Social History     Socioeconomic History    Marital status:      Spouse name: Lakia    Number of children: 2   Occupational History    Occupation: superintendent     Comment:  [employer]     Employer: [employer]   Tobacco Use    Smoking status: Former Smoker     Types: Cigarettes     Quit date: 10/15/2017     Years since quittin.4    Smokeless tobacco: Never Used   Substance and Sexual Activity    Alcohol use: Yes     Alcohol/week: 0.0 standard drinks     Comment: socially    Drug use: No    Sexual activity: Yes     Partners: Female   Social History Narrative    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       OBJECTIVE:     Vital Signs Range (Last 24H):         Significant Labs:  Lab Results   Component Value Date    WBC 8.47 2020    HGB 16.3 2020    HCT 49.5 2020     2020    CHOL 197 2021    TRIG 269 (H) 2021    HDL 34 (L) 2021    ALT 25  11/18/2021    AST 23 11/18/2021     08/03/2021    K 4.1 08/03/2021     08/03/2021    CREATININE 0.8 02/14/2022    BUN 10 08/03/2021    CO2 21 (L) 08/03/2021    TSH 2.258 07/14/2020    PSA 2.5 08/03/2021    GLUF 98 03/29/2007    HGBA1C 5.6 07/25/2014       Diagnostic Studies: No relevant studies.    EKG:   Results for orders placed or performed during the hospital encounter of 07/14/20   EKG 12-lead    Collection Time: 07/14/20  8:32 AM    Narrative    Test Reason : Z00.00,R00.0,    Vent. Rate : 070 BPM     Atrial Rate : 070 BPM     P-R Int : 164 ms          QRS Dur : 094 ms      QT Int : 382 ms       P-R-T Axes : 051 027 028 degrees     QTc Int : 412 ms    Normal sinus rhythm  Normal ECG  When compared with ECG of 25-JUL-2014 08:52,  No significant change was found  Confirmed by ALICIA EDMONDS MD ([id number]) on 7/14/2020 9:08:48 AM    Referred By: SHERRIE CHOI           Confirmed By:ALICIA EDMONDS MD       ECHOCARDIOGRAM:  TTE:  No results found for this or any previous visit.        ASSESSMENT/PLAN:           Pre-op Assessment    I have reviewed the Patient Summary Reports.       I have reviewed the Medications.     Review of Systems  Anesthesia Hx:  No problems with previous Anesthesia Denies Hx of Anesthetic complications  History of prior surgery of interest to airway management or planning:  Denies Personal Hx of Anesthesia complications.   Hematology/Oncology:  Hematology Normal   Oncology Normal     EENT/Dental:EENT/Dental Normal   Cardiovascular:   Exercise tolerance: good Hypertension PVD hyperlipidemia    Pulmonary:   Sleep Apnea    Renal/:  Renal/ Normal     Hepatic/GI:   GERD    Musculoskeletal:  Musculoskeletal Normal    Neurological:  Neurology Normal    Endocrine:  Endocrine Normal    Dermatological:  Skin Normal    Psych:  Psychiatric Normal           Physical Exam  General: Well nourished    Airway:  Mallampati: III / II  Mouth Opening: Normal  Tongue: Normal  Neck ROM: Normal  ROM    Chest/Lungs:  Normal Respiratory Rate    Heart:  Rate: Normal        Anesthesia Plan  Type of Anesthesia, risks & benefits discussed:    Anesthesia Type: MAC  Intra-op Monitoring Plan: Standard ASA Monitors  Post Op Pain Control Plan: multimodal analgesia and IV/PO Opioids PRN  Induction:  IV  Informed Consent: Informed consent signed with the Patient and all parties understand the risks and agree with anesthesia plan.  All questions answered.   ASA Score: 3  Day of Surgery Review of History & Physical: H&P Update referred to the surgeon/provider.  Anesthesia Plan Notes: NPO confirmed.   No history of anesthesia problems.  Discussed case in detail with Dr. Bay -irena minimal sedation    Ready For Surgery From Anesthesia Perspective.     .

## 2022-03-14 NOTE — PRE-PROCEDURE INSTRUCTIONS
NO SOLID FOOD, MILK OR MILK PRODUCTS 8 HOURS PRIOR TO SX START TIME.  YOU MAY ONLY HAVE SIPS OF WATER WITH MORNING MEDICATIONS (1) HOUR PRIOR TO ARRIVAL TO HOSPITAL.     PREOP INSTRUCTIONS:    Shower instructions as well as directions to the Surgery Center were given.Encouraged to wear loose fitting,comfortable clothing.Medication instructions for pm prior to and am of procedure reviewed.Instructed to avoid taking vitamins,supplements,aspirin and ibuprofen the morning of surgery.     Patient advised of the updated visitor policy allowing one adult support person,pre and post procedure.     Patient denies any side effects or issues with anesthesia or sedation.      Patient does not know arrival time.Explained that this information comes from the surgeon's office and if they haven't heard from them by 4 pm to call the office.Patient stated an understanding.

## 2022-03-15 ENCOUNTER — ANESTHESIA (OUTPATIENT)
Dept: SURGERY | Facility: HOSPITAL | Age: 57
End: 2022-03-15
Payer: COMMERCIAL

## 2022-03-15 ENCOUNTER — HOSPITAL ENCOUNTER (OUTPATIENT)
Facility: HOSPITAL | Age: 57
Discharge: HOME | End: 2022-03-15
Attending: SURGERY | Admitting: SURGERY
Payer: COMMERCIAL

## 2022-03-15 VITALS
BODY MASS INDEX: 32.99 KG/M2 | OXYGEN SATURATION: 97 % | TEMPERATURE: 97 F | HEIGHT: 65 IN | WEIGHT: 198 LBS | HEART RATE: 65 BPM | RESPIRATION RATE: 18 BRPM | DIASTOLIC BLOOD PRESSURE: 79 MMHG | SYSTOLIC BLOOD PRESSURE: 132 MMHG

## 2022-03-15 DIAGNOSIS — I73.9 PVD (PERIPHERAL VASCULAR DISEASE): Primary | ICD-10-CM

## 2022-03-15 PROCEDURE — C1760 CLOSURE DEV, VASC: HCPCS | Performed by: SURGERY

## 2022-03-15 PROCEDURE — C1769 GUIDE WIRE: HCPCS | Performed by: SURGERY

## 2022-03-15 PROCEDURE — D9220A PRA ANESTHESIA: Mod: ,,, | Performed by: ANESTHESIOLOGY

## 2022-03-15 PROCEDURE — C1874 STENT, COATED/COV W/DEL SYS: HCPCS | Performed by: SURGERY

## 2022-03-15 PROCEDURE — C1876 STENT, NON-COA/NON-COV W/DEL: HCPCS | Performed by: SURGERY

## 2022-03-15 PROCEDURE — C1725 CATH, TRANSLUMIN NON-LASER: HCPCS | Performed by: SURGERY

## 2022-03-15 PROCEDURE — C1887 CATHETER, GUIDING: HCPCS | Performed by: SURGERY

## 2022-03-15 PROCEDURE — 37000009 HC ANESTHESIA EA ADD 15 MINS: Performed by: SURGERY

## 2022-03-15 PROCEDURE — 37000008 HC ANESTHESIA 1ST 15 MINUTES: Performed by: SURGERY

## 2022-03-15 PROCEDURE — 63600175 PHARM REV CODE 636 W HCPCS: Performed by: STUDENT IN AN ORGANIZED HEALTH CARE EDUCATION/TRAINING PROGRAM

## 2022-03-15 PROCEDURE — 25000003 PHARM REV CODE 250: Performed by: STUDENT IN AN ORGANIZED HEALTH CARE EDUCATION/TRAINING PROGRAM

## 2022-03-15 PROCEDURE — 25500020 PHARM REV CODE 255: Performed by: SURGERY

## 2022-03-15 PROCEDURE — 71000015 HC POSTOP RECOV 1ST HR: Performed by: SURGERY

## 2022-03-15 PROCEDURE — 37221 PR REVASCULARIZE ILIAC ARTERY,ANGIOPLASTY/STENT, INITIAL VESSEL: ICD-10-PCS | Mod: 50,,, | Performed by: SURGERY

## 2022-03-15 PROCEDURE — 25000003 PHARM REV CODE 250: Performed by: SURGERY

## 2022-03-15 PROCEDURE — 27201423 OPTIME MED/SURG SUP & DEVICES STERILE SUPPLY: Performed by: SURGERY

## 2022-03-15 PROCEDURE — 71000016 HC POSTOP RECOV ADDL HR: Performed by: SURGERY

## 2022-03-15 PROCEDURE — 36000707: Performed by: SURGERY

## 2022-03-15 PROCEDURE — C1894 INTRO/SHEATH, NON-LASER: HCPCS | Performed by: SURGERY

## 2022-03-15 PROCEDURE — 71000044 HC DOSC ROUTINE RECOVERY FIRST HOUR: Performed by: SURGERY

## 2022-03-15 PROCEDURE — D9220A PRA ANESTHESIA: ICD-10-PCS | Mod: ,,, | Performed by: ANESTHESIOLOGY

## 2022-03-15 PROCEDURE — 36000706: Performed by: SURGERY

## 2022-03-15 PROCEDURE — 63600175 PHARM REV CODE 636 W HCPCS: Performed by: SURGERY

## 2022-03-15 PROCEDURE — 37221 PR REVASCULARIZE ILIAC ARTERY,ANGIOPLASTY/STENT, INITIAL VESSEL: CPT | Mod: 50,,, | Performed by: SURGERY

## 2022-03-15 DEVICE — VIABAHN BX BALLOON EXP ENDO 7MMX59MM 7FR 80CMCATH HEPARIN
Type: IMPLANTABLE DEVICE | Site: AORTA | Status: FUNCTIONAL
Brand: GORE VIABAHN VBX BALLOON EXPANDABLE ENDO

## 2022-03-15 DEVICE — IMPLANTABLE DEVICE: Type: IMPLANTABLE DEVICE | Site: AORTA | Status: FUNCTIONAL

## 2022-03-15 RX ORDER — CEFAZOLIN SODIUM/WATER 2 G/20 ML
2 SYRINGE (ML) INTRAVENOUS
Status: COMPLETED | OUTPATIENT
Start: 2022-03-15 | End: 2022-03-15

## 2022-03-15 RX ORDER — ONDANSETRON 2 MG/ML
4 INJECTION INTRAMUSCULAR; INTRAVENOUS EVERY 12 HOURS PRN
Status: DISCONTINUED | OUTPATIENT
Start: 2022-03-15 | End: 2022-03-15 | Stop reason: HOSPADM

## 2022-03-15 RX ORDER — MIDAZOLAM HYDROCHLORIDE 1 MG/ML
INJECTION, SOLUTION INTRAMUSCULAR; INTRAVENOUS
Status: DISCONTINUED | OUTPATIENT
Start: 2022-03-15 | End: 2022-03-15

## 2022-03-15 RX ORDER — HEPARIN SODIUM 1000 [USP'U]/ML
INJECTION, SOLUTION INTRAVENOUS; SUBCUTANEOUS
Status: DISCONTINUED | OUTPATIENT
Start: 2022-03-15 | End: 2022-03-15

## 2022-03-15 RX ORDER — SODIUM CHLORIDE 0.9 % (FLUSH) 0.9 %
10 SYRINGE (ML) INJECTION
Status: DISCONTINUED | OUTPATIENT
Start: 2022-03-15 | End: 2022-03-15 | Stop reason: HOSPADM

## 2022-03-15 RX ORDER — PROTAMINE SULFATE 10 MG/ML
INJECTION, SOLUTION INTRAVENOUS
Status: DISCONTINUED | OUTPATIENT
Start: 2022-03-15 | End: 2022-03-15

## 2022-03-15 RX ORDER — HEPARIN SODIUM 1000 [USP'U]/ML
INJECTION, SOLUTION INTRAVENOUS; SUBCUTANEOUS
Status: DISCONTINUED | OUTPATIENT
Start: 2022-03-15 | End: 2022-03-15 | Stop reason: HOSPADM

## 2022-03-15 RX ORDER — SODIUM CHLORIDE 9 MG/ML
INJECTION, SOLUTION INTRAVENOUS CONTINUOUS
Status: DISCONTINUED | OUTPATIENT
Start: 2022-03-15 | End: 2022-03-15 | Stop reason: HOSPADM

## 2022-03-15 RX ORDER — DEXMEDETOMIDINE HYDROCHLORIDE 100 UG/ML
INJECTION, SOLUTION INTRAVENOUS
Status: DISCONTINUED | OUTPATIENT
Start: 2022-03-15 | End: 2022-03-15

## 2022-03-15 RX ORDER — IODIXANOL 320 MG/ML
INJECTION, SOLUTION INTRAVASCULAR
Status: DISCONTINUED | OUTPATIENT
Start: 2022-03-15 | End: 2022-03-15 | Stop reason: HOSPADM

## 2022-03-15 RX ORDER — FENTANYL CITRATE 50 UG/ML
INJECTION, SOLUTION INTRAMUSCULAR; INTRAVENOUS
Status: DISCONTINUED | OUTPATIENT
Start: 2022-03-15 | End: 2022-03-15

## 2022-03-15 RX ORDER — LIDOCAINE HYDROCHLORIDE 10 MG/ML
INJECTION, SOLUTION EPIDURAL; INFILTRATION; INTRACAUDAL; PERINEURAL
Status: DISCONTINUED | OUTPATIENT
Start: 2022-03-15 | End: 2022-03-15 | Stop reason: HOSPADM

## 2022-03-15 RX ADMIN — PROTAMINE SULFATE 5 MG: 10 INJECTION, SOLUTION INTRAVENOUS at 02:03

## 2022-03-15 RX ADMIN — MIDAZOLAM 2 MG: 1 INJECTION INTRAMUSCULAR; INTRAVENOUS at 01:03

## 2022-03-15 RX ADMIN — HEPARIN SODIUM 7000 UNITS: 1000 INJECTION, SOLUTION INTRAVENOUS; SUBCUTANEOUS at 01:03

## 2022-03-15 RX ADMIN — Medication 2 G: at 01:03

## 2022-03-15 RX ADMIN — FENTANYL CITRATE 100 MCG: 50 INJECTION INTRAMUSCULAR; INTRAVENOUS at 01:03

## 2022-03-15 RX ADMIN — PROTAMINE SULFATE 10 MG: 10 INJECTION, SOLUTION INTRAVENOUS at 02:03

## 2022-03-15 RX ADMIN — DEXMEDETOMIDINE HYDROCHLORIDE 4 MCG: 100 INJECTION, SOLUTION INTRAVENOUS at 02:03

## 2022-03-15 RX ADMIN — DEXMEDETOMIDINE HYDROCHLORIDE 12 MCG: 100 INJECTION, SOLUTION INTRAVENOUS at 01:03

## 2022-03-15 RX ADMIN — DEXMEDETOMIDINE HYDROCHLORIDE 4 MCG: 100 INJECTION, SOLUTION INTRAVENOUS at 01:03

## 2022-03-15 RX ADMIN — SODIUM CHLORIDE: 0.9 INJECTION, SOLUTION INTRAVENOUS at 01:03

## 2022-03-15 NOTE — PLAN OF CARE
Adult Patient Discharge. EUSEBIO/PADSS Scoring met. PO Liquids tolerated prior to discharge. Patient laid flat/supine position x4hrs per MD an groin/sx sites assessed q/30 minutes. Education provided.

## 2022-03-15 NOTE — PLAN OF CARE
Patient observed x4hrs per MD. Surgical sites assessed q/30 minutes with no issues noted. Vital signs to patients baseline and within defined limits. Patient reported that he had blistering and hives when Tegaderm was applied to last surgical site; added as allergy to chart.

## 2022-03-15 NOTE — BRIEF OP NOTE
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  Crossed retrograde.  Predilation with 5x60mm ultraverse.  Stents deployed as above.  Completion angiogram with excellent flow restored, no residual SEAN stenosis.  Bilateral groin minx, R with some hematoma controlled with protamine administration and pressure for 10 min.    Estimated Blood Loss: 15ml         Specimens:   Specimen (24h ago, onward)            None            Discharge Note    OUTCOME: Patient tolerated treatment/procedure well without complication and is now ready for discharge.    DISPOSITION: Home or Self Care    FINAL DIAGNOSIS: Occluded R SEAN    FOLLOWUP: In clinic    DISCHARGE INSTRUCTIONS:    Discharge Procedure Orders   Diet Cardiac     Remove dressing in 48 hours     Notify your health care provider if you experience any of the following:  temperature >100.4     Notify your health care provider if you experience any of the following:  persistent nausea and vomiting or diarrhea     Notify your health care provider if you experience any of the following:  severe uncontrolled pain     Notify your health care provider if you experience any of the following:  redness, tenderness, or signs of infection (pain, swelling, redness, odor or green/yellow discharge around incision site)     Notify your health care  provider if you experience any of the following:  difficulty breathing or increased cough     Notify your health care provider if you experience any of the following:  worsening rash     Activity as tolerated

## 2022-03-15 NOTE — TRANSFER OF CARE
"Anesthesia Transfer of Care Note    Patient: Nanda Davidsonler III    Procedure(s) Performed: Procedure(s) (LRB):  AORTOGRAM, WITH SERIALOGRAPHY (N/A)  STENTS, ILIAC, BILATERAL (Bilateral)    Patient location: PACU    Anesthesia Type: MAC    Transport from OR: Transported from OR on room air with adequate spontaneous ventilation    Post pain: adequate analgesia    Post assessment: no apparent anesthetic complications    Post vital signs: stable    Level of consciousness: alert and awake    Nausea/Vomiting: no nausea/vomiting    Complications: none    Transfer of care protocol was followed      Last vitals:   Visit Vitals  /83   Pulse 70   Temp 36.6 °C (97.8 °F) (Oral)   Resp 18   Ht 5' 5" (1.651 m)   Wt 89.8 kg (198 lb)   SpO2 96%   BMI 32.95 kg/m²     "

## 2022-03-17 NOTE — OP NOTE
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  Crossed retrograde.  Predilation with 5x60mm ultraverse.  Stents deployed as above.  Completion angiogram with excellent flow restored, no residual SEAN stenosis.  Bilateral groin minx, R with some hematoma controlled with protamine administration and pressure for 10 min.     Estimated Blood Loss: 15ml    Description of Technical Procedures:   After informed consent was obtained patient was brought to the operating room laid supine on the operating table.  Moderate sedation was provided by Anesthesia.  Bilateral groins were prepped and draped in standard fashion.  Time-out performed prior to beginning.  The left groin was investigated with ultrasound.  Ultrasound-guided access was performed of the left common femoral artery.  Wire advanced easily, position confirmed on fluoroscopy.  Needle was exchanged for a 4 [place] micro sheath.  Groin angiogram was performed showing good access with no dissection.  The stiff angled Glidewire was inserted into the distal aorta.  A 5 [place] sheath was exchanged and placed the 4 [place].  Omni Flush catheter was inserted over the wire.  Power injection angiography revealed occlusion of the proximal right common iliac artery.  There was reconstitution proximal to  the takeoff of the external iliac artery.  The we then used ultrasound to gain access to the right common femoral artery.  Micro wire advanced easily was confirmed on fluoroscopy.  Four Filipino sheath was exchanged in.  Groin angiogram was performed showing good access with no dissection.  Stiff angled glidewire was inserted and this was directed through the lesion and into the aorta and.  Angled Glidecath was inserted a the and this traversed the lesion easily into the distal aorta.  Contrast was used to confirm that we were intraluminal.  Wire was reinserted the.  The pre dilation of the lesion was performed about by 60 mm Ultraverse balloon.  We then inserted a 7 X 59 vbx stent was inserted and positioned across the lesion.  Likewise, a 8x29mm omnilink bare metal stent was inserted to kiss at the iliac origins.  These were deployed simultaneously.  Completion angiogram was performed showing good position of the stents and resolution of R SEAN occlusion with no residual stenosis.  Bilateral 7F minx devices were used in the groins with good hemostasis.  Patient was taken to PACU in good condition.    Implants:   Implant Name Type Inv. Item Serial No.  Lot No. LRB No. Used Action   SYS STNT BLLN EXPND [id number] - [id number] Balloon Expandable Drug Eluting Stent SYS STNT BLLN EXPND [id number]  [employer] [id number] Left 1 Implanted   STENT VIABAHN VBX 7X59MM 80CM - [id number] Self Expanding Covered Stent STENT VIABAHN VBX 7X59MM 80CM [id number] [employer]  Right 1 Implanted       Specimens:   Specimen (24h ago, onward)            None                  Condition: Good    Disposition: PACU - hemodynamically stable.

## 2022-03-22 ENCOUNTER — TELEPHONE (OUTPATIENT)
Dept: VASCULAR SURGERY | Facility: CLINIC | Age: 57
End: 2022-03-22
Payer: COMMERCIAL

## 2022-03-22 ENCOUNTER — HOSPITAL ENCOUNTER (OUTPATIENT)
Dept: VASCULAR SURGERY | Facility: CLINIC | Age: 57
Discharge: HOME | End: 2022-03-22
Attending: SURGERY
Payer: COMMERCIAL

## 2022-03-22 DIAGNOSIS — I73.9 PAD (PERIPHERAL ARTERY DISEASE): ICD-10-CM

## 2022-03-22 DIAGNOSIS — I73.9 PAD (PERIPHERAL ARTERY DISEASE): Primary | ICD-10-CM

## 2022-03-22 PROCEDURE — 93926 LOWER EXTREMITY STUDY: CPT | Mod: S$GLB,,, | Performed by: SURGERY

## 2022-03-22 PROCEDURE — 93926 PR DUPLEX LO EXTREM ART UNILAT/LTD: ICD-10-PCS | Mod: S$GLB,,, | Performed by: SURGERY

## 2022-03-22 NOTE — TELEPHONE ENCOUNTER
Contacted patient in response to message. States he has an area of painful tender swelling to his left groin approx the size of a golf ball. Denies redness or drainage. Denies swelling, coldness, or numbness to his left leg. Appt scheduled for today for CFAL to R/O PSA. ----- Message from Domingo Urena sent at 3/22/2022 10:42 AM CDT -----  Regarding: POST OP ISSUES  Contact: Self  Pt stated he is having some post op issues, ask for a call      Contact info  [phone] Cell

## 2022-04-20 ENCOUNTER — PATIENT MESSAGE (OUTPATIENT)
Dept: ADMINISTRATIVE | Facility: OTHER | Age: 57
End: 2022-04-20
Payer: COMMERCIAL

## 2022-05-23 ENCOUNTER — OFFICE VISIT (OUTPATIENT)
Dept: VASCULAR SURGERY | Facility: CLINIC | Age: 57
End: 2022-05-23
Payer: COMMERCIAL

## 2022-05-23 VITALS
BODY MASS INDEX: 31.96 KG/M2 | HEIGHT: 65 IN | DIASTOLIC BLOOD PRESSURE: 82 MMHG | WEIGHT: 191.81 LBS | TEMPERATURE: 98 F | SYSTOLIC BLOOD PRESSURE: 131 MMHG | HEART RATE: 98 BPM

## 2022-05-23 DIAGNOSIS — I74.5 OCCLUSION OF RIGHT ILIAC ARTERY: Primary | ICD-10-CM

## 2022-05-23 PROCEDURE — 3079F DIAST BP 80-89 MM HG: CPT | Mod: CPTII,S$GLB,, | Performed by: SURGERY

## 2022-05-23 PROCEDURE — 99999 PR PBB SHADOW E&M-EST. PATIENT-LVL III: ICD-10-PCS | Mod: PBBFAC,,, | Performed by: SURGERY

## 2022-05-23 PROCEDURE — 3079F PR MOST RECENT DIASTOLIC BLOOD PRESSURE 80-89 MM HG: ICD-10-PCS | Mod: CPTII,S$GLB,, | Performed by: SURGERY

## 2022-05-23 PROCEDURE — 99212 OFFICE O/P EST SF 10 MIN: CPT | Mod: S$GLB,,, | Performed by: SURGERY

## 2022-05-23 PROCEDURE — 3008F BODY MASS INDEX DOCD: CPT | Mod: CPTII,S$GLB,, | Performed by: SURGERY

## 2022-05-23 PROCEDURE — 4010F ACE/ARB THERAPY RXD/TAKEN: CPT | Mod: CPTII,S$GLB,, | Performed by: SURGERY

## 2022-05-23 PROCEDURE — 3075F SYST BP GE 130 - 139MM HG: CPT | Mod: CPTII,S$GLB,, | Performed by: SURGERY

## 2022-05-23 PROCEDURE — 3008F PR BODY MASS INDEX (BMI) DOCUMENTED: ICD-10-PCS | Mod: CPTII,S$GLB,, | Performed by: SURGERY

## 2022-05-23 PROCEDURE — 99999 PR PBB SHADOW E&M-EST. PATIENT-LVL III: CPT | Mod: PBBFAC,,, | Performed by: SURGERY

## 2022-05-23 PROCEDURE — 4010F PR ACE/ARB THEARPY RXD/TAKEN: ICD-10-PCS | Mod: CPTII,S$GLB,, | Performed by: SURGERY

## 2022-05-23 PROCEDURE — 3075F PR MOST RECENT SYSTOLIC BLOOD PRESS GE 130-139MM HG: ICD-10-PCS | Mod: CPTII,S$GLB,, | Performed by: SURGERY

## 2022-05-23 PROCEDURE — 1159F MED LIST DOCD IN RCRD: CPT | Mod: CPTII,S$GLB,, | Performed by: SURGERY

## 2022-05-23 PROCEDURE — 1159F PR MEDICATION LIST DOCUMENTED IN MEDICAL RECORD: ICD-10-PCS | Mod: CPTII,S$GLB,, | Performed by: SURGERY

## 2022-05-23 PROCEDURE — 99212 PR OFFICE/OUTPT VISIT, EST, LEVL II, 10-19 MIN: ICD-10-PCS | Mod: S$GLB,,, | Performed by: SURGERY

## 2022-05-23 NOTE — PROGRESS NOTES
VASCULAR SURGERY NOTE    Patient ID: Nanda Colin III is a 56 y.o. male.    I. HISTORY     Chief Complaint: Intermittent claudication     HPI: Nanda Colin III is a 56 y.o. male who is here today for follow up appointment. Patient's primary complaint is right leg pain, characterized as an aching in the posterior calf and numbness to the foot, when he walks for than 1/2 a block, requiring 3-5 minutes of rest for pain to regress. Patient states also has long standing history of back pain, bulging discs, and OA of his lumbar and sacral spine with no significant resolution of symptoms with recent L4-S1 epidural steroid injections. He is a  and this pain is inhibiting his ability to preform daily activities. Laying and rest seem to be the only alleviating factors. Patient denies pain at rest. He indicates inability to have and maintain an erection, and also endorses pain in the buttock and lower back. He relays current tobacco use, 1 pack a day, for the last 30 years. Patient also notes an extensive history of heart attack, stroke, and cancer in his family history.    Interval History 5/23/22: patient states that his claudication has been stable.  Patient denies worsening of numbness in the evening in the evening while at rest. Patient's imaging is stable and he is okay with following up as needed if his symptoms worsen.        Review of patient's allergies indicates:   Allergen Reactions    Adhesive Hives and Blisters     TEGADERM - Transparent Film       Past Medical History:   Diagnosis Date    BPH (benign prostatic hypertrophy)     Hernia     Hyperlipidemia     Hypertension, essential 12/30/2015    Incarcerated umbilical hernia 6/25/2016    Umbilical hernia      Family History   Problem Relation Age of Onset    Stroke Father     Heart disease Paternal Uncle     Kidney disease Paternal Uncle     Depression Paternal Uncle     Cancer Maternal Grandfather     COPD Maternal  Grandfather     Cancer Paternal Grandmother     COPD Paternal Aunt      Social History     Occupational History    Occupation: superintendent     Comment:  [employer]     Employer: [employer]   Tobacco Use    Smoking status: Former Smoker     Types: Cigarettes     Quit date: 10/15/2017     Years since quittin.3    Smokeless tobacco: Never Used   Substance and Sexual Activity    Alcohol use: Yes     Alcohol/week: 0.0 standard drinks     Comment: socially    Drug use: No    Sexual activity: Yes     Partners: Female       Current Outpatient Medications on File Prior to Visit   Medication Sig Dispense Refill    aspirin (ECOTRIN) 81 MG EC tablet Take 81 mg by mouth.      clopidogreL (PLAVIX) 75 mg tablet Take 1 tablet (75 mg total) by mouth once daily. 30 tablet 11    clotrimazole (LOTRIMIN) 1 % cream Apply topically 2 (two) times daily. 60 g 0    losartan (COZAAR) 25 MG tablet Take 1 tablet (25 mg total) by mouth once daily. 90 tablet 3    pravastatin (PRAVACHOL) 80 MG tablet Take 1 tablet (80 mg total) by mouth once daily. 90 tablet 3    sildenafiL (VIAGRA) 100 MG tablet Take 1 tablet (100 mg total) by mouth daily as needed for Erectile Dysfunction. 30 tablet 2    tamsulosin (FLOMAX) 0.4 mg Cap TAKE 1 CAPSULE BY MOUTH EVERY DAY 90 capsule 3     No current facility-administered medications on file prior to visit.        Past Surgical History:   Procedure Laterality Date    AORTOGRAPHY WITH SERIALOGRAPHY N/A 3/15/2022    Procedure: AORTOGRAM, WITH SERIALOGRAPHY;  Surgeon: Ruperto Bay MD;  [facility];  Service: Vascular;  Laterality: N/A;  aortogram w/ runoff & PTA,    COLONOSCOPY N/A 2019    Procedure: COLONOSCOPY;  Surgeon: Fritz Franco MD;  Location: [facility] ENDO (4TH FLR);  Service: Endoscopy;  Laterality: N/A;  Do not cancel this order    HERNIA REPAIR      TRANSFORAMINAL EPIDURAL INJECTION OF STEROID Right 12/15/2021    Procedure:  Injection,steroid,epidural,transforaminal approach Right L4 & L5 (May change depending MRI);  Surgeon: Stacey Mota MD;  Location: [facility];  Service: Pain Management;  Laterality: Right;    VASECTOMY      WISDOM TOOTH EXTRACTION         Review of Systems   Constitutional: Negative for chills, decreased appetite, diaphoresis, fever, weight gain and weight loss.   HENT: Negative for ear pain and nosebleeds.    Eyes: Negative for discharge and pain.   Cardiovascular: Negative for chest pain, dyspnea on exertion, leg swelling and orthopnea.   Respiratory: Negative for cough, shortness of breath and wheezing.    Skin: Negative for rash and suspicious lesions.   Musculoskeletal: Positive for arthritis.   Gastrointestinal: Negative for abdominal pain, diarrhea, nausea and vomiting.   Genitourinary: Negative for dysuria.   Neurological: Positive for numbness. Negative for paresthesias.   Psychiatric/Behavioral: Negative for altered mental status.         II. PHYSICAL EXAM     Physical Exam  Constitutional:       General: He is not in acute distress.     Appearance: Normal appearance. He is obese. He is not ill-appearing.   HENT:      Head: Normocephalic and atraumatic.   Eyes:      Extraocular Movements: Extraocular movements intact.      Pupils: Pupils are equal, round, and reactive to light.   Cardiovascular:      Rate and Rhythm: Normal rate.      Pulses:           Femoral pulses are 0 on the right side and 2+ on the left side.       Dorsalis pedis pulses are detected w/ Doppler on the right side and 2+ on the left side.        Posterior tibial pulses are detected w/ Doppler on the right side and 2+ on the left side.   Pulmonary:      Effort: Pulmonary effort is normal. No respiratory distress.      Breath sounds: No wheezing.   Abdominal:      General: Abdomen is flat. There is no distension.      Palpations: Abdomen is soft.      Tenderness: There is no abdominal tenderness.   Musculoskeletal:          General: Tenderness present. No swelling or deformity.      Cervical back: Normal range of motion.      Right lower leg: No edema.      Left lower leg: No edema.   Feet:      Right foot:      Skin integrity: No ulcer or callus.      Toenail Condition: Right toenails are long.      Left foot:      Skin integrity: No ulcer or callus.      Toenail Condition: Left toenails are long.   Skin:     Findings: No erythema, lesion or rash.   Neurological:      General: No focal deficit present.      Mental Status: He is alert and oriented to person, place, and time.      Motor: No weakness.      Gait: Gait normal.   Psychiatric:         Mood and Affect: Mood normal.         Behavior: Behavior normal.       III. ASSESSMENT & PLAN (MEDICAL DECISION MAKING)       Imaging Results: (I have personally reviewed all images and provided interpretation below)    ABIs 02/14/22:   Right - 1.19; w/ normal waveforms   Left - 0.68; w/ blunted waveforms     CTA 3/3/22:  Impression:     1. 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  Otherwise no significant atherosclerotic disease in the lower extremities as detailed above.  2. Enlarged prostate.    Assessment/Diagnosis and Plan:      56 y.o. male with symptoms consistent with RLE claudication complicated by radiculopathy:  CTA reviewed.  I discussed the findings with the patient.  At this point he would probably benefit from an aortogram runoff possible PTA possible stent.  Right iliac common iliac is what is involved.  I have explained to him how your pelvis got a 90% chance of success.  I have told him we would do a PTA and stent possible bilateral stents.  All of his questions were answered the risk of bleeding and failure were discussed with the patient plan the procedure for next week    - Plan for endovascular revascularization  - Counseled patient on the importance of smoking cessation, especially now in the setting of RLE PAD.   - Aspirin 81 mg PO daily  -  Plavix 75 mg PO daily   - Continue statin   - Follow up prn

## 2022-06-12 ENCOUNTER — PATIENT MESSAGE (OUTPATIENT)
Dept: INTERNAL MEDICINE | Facility: CLINIC | Age: 57
End: 2022-06-12
Payer: COMMERCIAL

## 2022-06-13 RX ORDER — SCOLOPAMINE TRANSDERMAL SYSTEM 1 MG/1
1 PATCH, EXTENDED RELEASE TRANSDERMAL
Qty: 2 PATCH | Refills: 0 | Status: SHIPPED | OUTPATIENT
Start: 2022-06-13 | End: 2022-10-17 | Stop reason: ALTCHOICE

## 2022-09-04 ENCOUNTER — PATIENT MESSAGE (OUTPATIENT)
Dept: INTERNAL MEDICINE | Facility: CLINIC | Age: 57
End: 2022-09-04
Payer: COMMERCIAL

## 2022-09-04 DIAGNOSIS — Z12.5 PROSTATE CANCER SCREENING: ICD-10-CM

## 2022-09-04 DIAGNOSIS — Z00.00 ANNUAL PHYSICAL EXAM: Primary | ICD-10-CM

## 2022-09-04 DIAGNOSIS — I10 HYPERTENSION, ESSENTIAL: ICD-10-CM

## 2022-09-04 DIAGNOSIS — E78.5 HYPERLIPIDEMIA, UNSPECIFIED HYPERLIPIDEMIA TYPE: ICD-10-CM

## 2022-09-05 NOTE — TELEPHONE ENCOUNTER
Patient has an existing appointment - See lab orders and please call patient to schedule labs before appointment. Thank you

## 2022-10-10 ENCOUNTER — LAB VISIT (OUTPATIENT)
Dept: LAB | Facility: HOSPITAL | Age: 57
End: 2022-10-10
Attending: FAMILY MEDICINE
Payer: COMMERCIAL

## 2022-10-10 DIAGNOSIS — Z00.00 ANNUAL PHYSICAL EXAM: ICD-10-CM

## 2022-10-10 DIAGNOSIS — I10 HYPERTENSION, ESSENTIAL: ICD-10-CM

## 2022-10-10 DIAGNOSIS — Z12.5 PROSTATE CANCER SCREENING: ICD-10-CM

## 2022-10-10 DIAGNOSIS — E78.5 HYPERLIPIDEMIA, UNSPECIFIED HYPERLIPIDEMIA TYPE: ICD-10-CM

## 2022-10-10 LAB
ALBUMIN SERPL BCP-MCNC: 4.1 G/DL (ref 3.5–5.2)
ALP SERPL-CCNC: 77 U/L (ref 55–135)
ALT SERPL W/O P-5'-P-CCNC: 29 U/L (ref 10–44)
ANION GAP SERPL CALC-SCNC: 8 MMOL/L (ref 8–16)
AST SERPL-CCNC: 22 U/L (ref 10–40)
BILIRUB SERPL-MCNC: 1.1 MG/DL (ref 0.1–1)
BUN SERPL-MCNC: 9 MG/DL (ref 6–20)
CALCIUM SERPL-MCNC: 9.9 MG/DL (ref 8.7–10.5)
CHLORIDE SERPL-SCNC: 105 MMOL/L (ref 95–110)
CHOLEST SERPL-MCNC: 200 MG/DL (ref 120–199)
CHOLEST/HDLC SERPL: 5.4 {RATIO} (ref 2–5)
CO2 SERPL-SCNC: 26 MMOL/L (ref 23–29)
COMPLEXED PSA SERPL-MCNC: 2.3 NG/ML (ref 0–4)
CREAT SERPL-MCNC: 0.8 MG/DL (ref 0.5–1.4)
EST. GFR  (NO RACE VARIABLE): >60 ML/MIN/1.73 M^2
GLUCOSE SERPL-MCNC: 110 MG/DL (ref 70–110)
HDLC SERPL-MCNC: 37 MG/DL (ref 40–75)
HDLC SERPL: 18.5 % (ref 20–50)
LDLC SERPL CALC-MCNC: 106.8 MG/DL (ref 63–159)
NONHDLC SERPL-MCNC: 163 MG/DL
POTASSIUM SERPL-SCNC: 4.2 MMOL/L (ref 3.5–5.1)
PROT SERPL-MCNC: 7.3 G/DL (ref 6–8.4)
SODIUM SERPL-SCNC: 139 MMOL/L (ref 136–145)
TRIGL SERPL-MCNC: 281 MG/DL (ref 30–150)

## 2022-10-10 PROCEDURE — 80053 COMPREHEN METABOLIC PANEL: CPT | Performed by: FAMILY MEDICINE

## 2022-10-10 PROCEDURE — 80061 LIPID PANEL: CPT | Performed by: FAMILY MEDICINE

## 2022-10-10 PROCEDURE — 84153 ASSAY OF PSA TOTAL: CPT | Performed by: FAMILY MEDICINE

## 2022-10-10 PROCEDURE — 36415 COLL VENOUS BLD VENIPUNCTURE: CPT | Performed by: FAMILY MEDICINE

## 2022-10-17 ENCOUNTER — OFFICE VISIT (OUTPATIENT)
Dept: INTERNAL MEDICINE | Facility: CLINIC | Age: 57
End: 2022-10-17
Attending: FAMILY MEDICINE
Payer: COMMERCIAL

## 2022-10-17 VITALS
DIASTOLIC BLOOD PRESSURE: 76 MMHG | SYSTOLIC BLOOD PRESSURE: 119 MMHG | BODY MASS INDEX: 32.42 KG/M2 | OXYGEN SATURATION: 95 % | HEART RATE: 81 BPM | HEIGHT: 65 IN | WEIGHT: 194.56 LBS

## 2022-10-17 DIAGNOSIS — N40.1 BPH WITH URINARY OBSTRUCTION: ICD-10-CM

## 2022-10-17 DIAGNOSIS — Z00.00 ANNUAL PHYSICAL EXAM: Primary | ICD-10-CM

## 2022-10-17 DIAGNOSIS — J84.10 LUNG GRANULOMA: ICD-10-CM

## 2022-10-17 DIAGNOSIS — E78.5 HYPERLIPIDEMIA, UNSPECIFIED HYPERLIPIDEMIA TYPE: ICD-10-CM

## 2022-10-17 DIAGNOSIS — K63.5 POLYP OF COLON, UNSPECIFIED PART OF COLON, UNSPECIFIED TYPE: ICD-10-CM

## 2022-10-17 DIAGNOSIS — I73.9 PAD (PERIPHERAL ARTERY DISEASE): ICD-10-CM

## 2022-10-17 DIAGNOSIS — Z12.5 PROSTATE CANCER SCREENING: ICD-10-CM

## 2022-10-17 DIAGNOSIS — M54.16 LUMBAR RADICULOPATHY: ICD-10-CM

## 2022-10-17 DIAGNOSIS — Z12.11 COLON CANCER SCREENING: ICD-10-CM

## 2022-10-17 DIAGNOSIS — N13.8 BPH WITH URINARY OBSTRUCTION: ICD-10-CM

## 2022-10-17 DIAGNOSIS — Z87.891 PERSONAL HISTORY OF NICOTINE DEPENDENCE: ICD-10-CM

## 2022-10-17 DIAGNOSIS — I25.10 CORONARY ARTERY CALCIFICATION SEEN ON CT SCAN: ICD-10-CM

## 2022-10-17 DIAGNOSIS — I10 HYPERTENSION, ESSENTIAL: ICD-10-CM

## 2022-10-17 PROCEDURE — 3078F PR MOST RECENT DIASTOLIC BLOOD PRESSURE < 80 MM HG: ICD-10-PCS | Mod: CPTII,S$GLB,, | Performed by: FAMILY MEDICINE

## 2022-10-17 PROCEDURE — 3074F SYST BP LT 130 MM HG: CPT | Mod: CPTII,S$GLB,, | Performed by: FAMILY MEDICINE

## 2022-10-17 PROCEDURE — 1160F PR REVIEW ALL MEDS BY PRESCRIBER/CLIN PHARMACIST DOCUMENTED: ICD-10-PCS | Mod: CPTII,S$GLB,, | Performed by: FAMILY MEDICINE

## 2022-10-17 PROCEDURE — 1160F RVW MEDS BY RX/DR IN RCRD: CPT | Mod: CPTII,S$GLB,, | Performed by: FAMILY MEDICINE

## 2022-10-17 PROCEDURE — 99396 PR PREVENTIVE VISIT,EST,40-64: ICD-10-PCS | Mod: S$GLB,,, | Performed by: FAMILY MEDICINE

## 2022-10-17 PROCEDURE — 99999 PR PBB SHADOW E&M-EST. PATIENT-LVL V: ICD-10-PCS | Mod: PBBFAC,,, | Performed by: FAMILY MEDICINE

## 2022-10-17 PROCEDURE — 4010F PR ACE/ARB THEARPY RXD/TAKEN: ICD-10-PCS | Mod: CPTII,S$GLB,, | Performed by: FAMILY MEDICINE

## 2022-10-17 PROCEDURE — 99999 PR PBB SHADOW E&M-EST. PATIENT-LVL V: CPT | Mod: PBBFAC,,, | Performed by: FAMILY MEDICINE

## 2022-10-17 PROCEDURE — 4010F ACE/ARB THERAPY RXD/TAKEN: CPT | Mod: CPTII,S$GLB,, | Performed by: FAMILY MEDICINE

## 2022-10-17 PROCEDURE — 1159F MED LIST DOCD IN RCRD: CPT | Mod: CPTII,S$GLB,, | Performed by: FAMILY MEDICINE

## 2022-10-17 PROCEDURE — 99396 PREV VISIT EST AGE 40-64: CPT | Mod: S$GLB,,, | Performed by: FAMILY MEDICINE

## 2022-10-17 PROCEDURE — 3074F PR MOST RECENT SYSTOLIC BLOOD PRESSURE < 130 MM HG: ICD-10-PCS | Mod: CPTII,S$GLB,, | Performed by: FAMILY MEDICINE

## 2022-10-17 PROCEDURE — 3078F DIAST BP <80 MM HG: CPT | Mod: CPTII,S$GLB,, | Performed by: FAMILY MEDICINE

## 2022-10-17 PROCEDURE — 1159F PR MEDICATION LIST DOCUMENTED IN MEDICAL RECORD: ICD-10-PCS | Mod: CPTII,S$GLB,, | Performed by: FAMILY MEDICINE

## 2022-10-17 RX ORDER — TAMSULOSIN HYDROCHLORIDE 0.4 MG/1
1 CAPSULE ORAL DAILY
Qty: 90 CAPSULE | Refills: 3 | Status: SHIPPED | OUTPATIENT
Start: 2022-10-17 | End: 2023-03-27 | Stop reason: SDUPTHER

## 2022-10-17 RX ORDER — ROSUVASTATIN CALCIUM 20 MG/1
20 TABLET, COATED ORAL DAILY
Qty: 90 TABLET | Refills: 1 | Status: SHIPPED | OUTPATIENT
Start: 2022-10-17 | End: 2023-01-31

## 2022-10-17 RX ORDER — CLOTRIMAZOLE 1 %
CREAM (GRAM) TOPICAL 2 TIMES DAILY
Qty: 60 G | Refills: 0 | Status: SHIPPED | OUTPATIENT
Start: 2022-10-17 | End: 2023-11-16 | Stop reason: SDUPTHER

## 2022-10-17 RX ORDER — LOSARTAN POTASSIUM 25 MG/1
25 TABLET ORAL DAILY
Qty: 90 TABLET | Refills: 3 | Status: SHIPPED | OUTPATIENT
Start: 2022-10-17 | End: 2023-11-16 | Stop reason: SDUPTHER

## 2022-10-17 NOTE — PROGRESS NOTES
Subjective:       Patient ID: Nanda Colin III is a 57 y.o. male.    Chief Complaint: Annual Exam    Established patient for an annual wellness check/physical exam and also chronic disease management. Specific complaints - see dictation, M*model entries and please see ROS.  Past, Surgical, Family, Social Histories; Medications, Allergies reviewed and reconciled.  Health maintenance file reviewed and addressed items due. Recent applicable lab, imaging and cardiovascular results reviewed.  Problem list items reviewed and modified or added entries (in the overview section) may not be transcribed into this encounter note due to note writer format.      Concerns about nocturia and difficulty urinating.  States he cannot sleep well because of this.  Concern about brother with prostate cancer.    Difficulties with back last year referred to pain management.  Injections not helpful.  Subsequent referral back to Neurosurgery.  Dr. Vera felt vascular etiology and refer to Dr. Bay, who did CTA is identified.  Had stents.  Symptoms got better.  Thinks they are coming back a little bit.  Atypical for claudication but due to somewhat occur with walking.  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        Review of Systems   Constitutional:  Negative for appetite change, chills, diaphoresis, fatigue and fever.   HENT:  Negative for congestion, postnasal drip, rhinorrhea, sore throat and trouble swallowing.    Eyes:  Negative for visual disturbance.   Respiratory:  Negative for cough, choking, chest tightness, shortness of breath and wheezing.    Cardiovascular:  Negative for chest pain and leg swelling.   Gastrointestinal:  Negative for abdominal distention, abdominal pain, diarrhea, nausea and vomiting.   Genitourinary:  Positive for difficulty urinating. Negative for hematuria.   Musculoskeletal:  Positive for arthralgias, back pain and myalgias.   Skin:  Negative for rash.   Neurological:  Negative  for weakness, light-headedness and headaches.   Psychiatric/Behavioral:  Negative for confusion and dysphoric mood.      Objective:      Physical Exam  Vitals and nursing note reviewed.   Constitutional:       Appearance: He is well-developed. He is not diaphoretic.   Eyes:      General: No scleral icterus.  Neck:      Thyroid: No thyromegaly.      Vascular: No carotid bruit or JVD.      Trachea: No tracheal deviation.   Cardiovascular:      Rate and Rhythm: Normal rate and regular rhythm.      Heart sounds: Normal heart sounds. No murmur heard.    No friction rub. No gallop.   Pulmonary:      Effort: Pulmonary effort is normal. No respiratory distress.      Breath sounds: Normal breath sounds. No wheezing or rales.   Abdominal:      General: There is no distension.      Palpations: Abdomen is soft. There is no mass.      Tenderness: There is no abdominal tenderness. There is no guarding or rebound.   Musculoskeletal:      Cervical back: Normal range of motion and neck supple.   Lymphadenopathy:      Cervical: No cervical adenopathy.   Skin:     General: Skin is warm and dry.      Findings: No erythema or rash.   Neurological:      Mental Status: He is alert and oriented to person, place, and time.      Cranial Nerves: No cranial nerve deficit.      Motor: No tremor.      Coordination: Coordination normal.      Gait: Gait normal.   Psychiatric:         Behavior: Behavior normal.         Thought Content: Thought content normal.         Judgment: Judgment normal.       Assessment:       1. Annual physical exam    2. Personal history of nicotine dependence    3. Hypertension, essential    4. Hyperlipidemia, unspecified hyperlipidemia type    5. Prostate cancer screening    6. Colon cancer screening    7. Lumbar radiculopathy    8. BPH with urinary obstruction    9. Polyp of colon, unspecified part of colon, unspecified type    10. PAD (peripheral artery disease)    11. 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   Current Medications    ASPIRIN (ECOTRIN) 81 MG EC TABLET    Take 81 mg by mouth.    CLOPIDOGREL (PLAVIX) 75 MG TABLET    Take 1 tablet (75 mg total) by mouth once daily.    SILDENAFIL (VIAGRA) 100 MG TABLET    Take 1 tablet (100 mg total) by mouth daily as needed for Erectile Dysfunction.   Changed and/or Refilled Medications    Modified Medication Previous Medication    CLOTRIMAZOLE (LOTRIMIN) 1 % CREAM clotrimazole (LOTRIMIN) 1 % cream       Apply topically 2 (two) times daily.    Apply topically 2 (two) times daily.    LOSARTAN (COZAAR) 25 MG TABLET losartan (COZAAR) 25 MG tablet       Take 1 tablet (25 mg total) by mouth once daily.    TAKE 1 TABLET BY MOUTH EVERY DAY    TAMSULOSIN (FLOMAX) 0.4 MG CAP tamsulosin (FLOMAX) 0.4 mg Cap       Take 1 capsule (0.4 mg total) by mouth once daily.    TAKE 1 CAPSULE BY MOUTH EVERY DAY   Discontinued Medications    PRAVASTATIN (PRAVACHOL) 80 MG TABLET    TAKE 1 TABLET BY MOUTH EVERY DAY    SCOPOLAMINE (TRANSDERM-SCOP) 1.3-1.5 MG (1 MG OVER 3 DAYS)    Place 1 patch onto the skin every 72 hours as needed (sea or motion sickness). Apply to bony area behind ear, 12 hours before anticipated travel or exposure     1. Annual physical exam    2. Personal history of nicotine dependence  Comments:  25 or more yrs - 1 PPD  Overview:  25 or more yrs - 1 PPD    Orders:  -     CT Chest Lung Screening Low Dose; Future; Expected date: 10/17/2022    3. Hypertension, essential  -     losartan (COZAAR) 25 MG tablet; Take 1 tablet (25 mg total) by mouth once daily.  Dispense: 90 tablet; Refill: 3    4. Hyperlipidemia, unspecified hyperlipidemia type  Assessment & Plan:  Current LDL not at goal for findings on CT scan of ASCVD and also interval history of PA D with stents.  Change to Crestor 20 mg and titrate  to LDL 70 or less.  Advised patient to watch for new side effects.    Orders:  -     rosuvastatin (CRESTOR) 20 MG tablet; Take 1 tablet (20 mg total) by mouth once daily.  Dispense: 90 tablet; Refill: 1  -     Lipid Panel; Future; Expected date: 01/17/2023  -     AST (SGOT); Future; Expected date: 01/17/2023  -     ALT (SGPT); Future; Expected date: 01/17/2023    5. Prostate cancer screening    6. Colon cancer screening  -     Ambulatory referral/consult to Endo Procedure ; Future; Expected date: 10/18/2022    7. Lumbar radiculopathy    8. BPH with urinary obstruction  -     Ambulatory referral/consult to Urology; Future; Expected date: 10/24/2022  -     Urinalysis; Future; Expected date: 10/17/2022  -     Urinalysis Microscopic; Future; Expected date: 10/17/2022  -     Urine culture; Future; Expected date: 10/17/2022  -     tamsulosin (FLOMAX) 0.4 mg Cap; Take 1 capsule (0.4 mg total) by mouth once daily.  Dispense: 90 capsule; Refill: 3    9. Polyp of colon, unspecified part of colon, unspecified type  -     Ambulatory referral/consult to Endo Procedure ; Future; Expected date: 10/18/2022    10. PAD (peripheral artery disease)  Overview:  -3/3/2022 - CTA - Right common iliac artery occlusion with reconstitution distally.  -stent Dr. Bay - 3/15/2022 - ILIAC, BILATERAL (Right VBX 7x59, Left omnilink 8x29)    Assessment & Plan:  Contact Dr. Bay for follow up    Orders:  -     rosuvastatin (CRESTOR) 20 MG tablet; Take 1 tablet (20 mg total) by mouth once daily.  Dispense: 90 tablet; Refill: 1    11. 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  Dispense: 60 g; Refill: 0    See meds, orders, follow up, routing and instructions sections of encounter and AVS. Discussed with patient and provided on AVS.    Discussed diet and  exercise and links provided on AVS for detailed information.    Lab Results   Component Value Date     10/10/2022    K 4.2 10/10/2022     10/10/2022    BUN 9 10/10/2022    CREATININE 0.8 10/10/2022     10/10/2022    HGBA1C 5.6 07/25/2014    MG 2.2 06/25/2016    AST 22 10/10/2022    ALT 29 10/10/2022    ALBUMIN 4.1 10/10/2022    PROT 7.3 10/10/2022    BILITOT 1.1 (H) 10/10/2022    CHOL 200 (H) 10/10/2022    HDL 37 (L) 10/10/2022    LDLCALC 106.8 10/10/2022    TRIG 281 (H) 10/10/2022    WBC 8.47 07/14/2020    HGB 16.3 07/14/2020    HCT 49.5 07/14/2020     07/14/2020    PSA 2.3 10/10/2022    TSH 2.258 07/14/2020

## 2022-10-17 NOTE — PATIENT INSTRUCTIONS
If not contacted in a couple weeks by colonoscopy scheduling department - call Colonoscopy Scheduling Number - [phone].     See standing or future lab orders and please draw Lipids, ALT, and AST - in 3 months, thank you.     Information about cholesterol, high blood pressure and healthy diet and activity recommendations can be found at the following links on the Internet:    http://www.nhlbi.nih.gov/health/health-topics/topics/hbc  http://www.nhlbi.nih.gov/health/educational/lose_wt/index.htm  Http://www.nhlbi.nih.gov/files/docs/public/heart/hbp_low.pdf  http://www.heart.org/HEARTORG/  http://diabetes.org/  https://www.cdc.gov/  Https://healthfinder.gov/  https://health.gov/dietaryguidelines/2015/guidelines/  https://health.gov/paguidelines/second-edition/pdf/Physical_Activity_Guidelines_2nd_edition.pdf

## 2022-10-17 NOTE — ASSESSMENT & PLAN NOTE
Current LDL not at goal for findings on CT scan of ASCVD and also interval history of PA D with stents.  Change to Crestor 20 mg and titrate to LDL 70 or less.  Advised patient to watch for new side effects.

## 2022-10-18 ENCOUNTER — LAB VISIT (OUTPATIENT)
Dept: LAB | Facility: HOSPITAL | Age: 57
End: 2022-10-18
Attending: FAMILY MEDICINE
Payer: COMMERCIAL

## 2022-10-18 DIAGNOSIS — N40.1 BPH WITH URINARY OBSTRUCTION: ICD-10-CM

## 2022-10-18 DIAGNOSIS — N13.8 BPH WITH URINARY OBSTRUCTION: ICD-10-CM

## 2022-10-18 LAB
BILIRUB UR QL STRIP: NEGATIVE
CLARITY UR REFRACT.AUTO: CLEAR
COLOR UR AUTO: YELLOW
GLUCOSE UR QL STRIP: NEGATIVE
HGB UR QL STRIP: NEGATIVE
KETONES UR QL STRIP: NEGATIVE
LEUKOCYTE ESTERASE UR QL STRIP: NEGATIVE
MICROSCOPIC COMMENT: NORMAL
NITRITE UR QL STRIP: NEGATIVE
PH UR STRIP: 6 [PH] (ref 5–8)
PROT UR QL STRIP: ABNORMAL
SP GR UR STRIP: 1.03 (ref 1–1.03)
URN SPEC COLLECT METH UR: ABNORMAL

## 2022-10-18 PROCEDURE — 81001 URINALYSIS AUTO W/SCOPE: CPT | Performed by: FAMILY MEDICINE

## 2022-10-18 PROCEDURE — 87086 URINE CULTURE/COLONY COUNT: CPT | Performed by: FAMILY MEDICINE

## 2022-10-19 LAB — BACTERIA UR CULT: NO GROWTH

## 2022-10-24 ENCOUNTER — HOSPITAL ENCOUNTER (OUTPATIENT)
Dept: RADIOLOGY | Facility: HOSPITAL | Age: 57
Discharge: HOME | End: 2022-10-24
Attending: FAMILY MEDICINE
Payer: COMMERCIAL

## 2022-10-24 ENCOUNTER — OFFICE VISIT (OUTPATIENT)
Dept: UROLOGY | Facility: CLINIC | Age: 57
End: 2022-10-24
Attending: FAMILY MEDICINE
Payer: COMMERCIAL

## 2022-10-24 VITALS
WEIGHT: 194.69 LBS | BODY MASS INDEX: 32.44 KG/M2 | DIASTOLIC BLOOD PRESSURE: 79 MMHG | SYSTOLIC BLOOD PRESSURE: 118 MMHG | HEART RATE: 84 BPM | HEIGHT: 65 IN

## 2022-10-24 DIAGNOSIS — N13.8 BPH WITH URINARY OBSTRUCTION: Primary | ICD-10-CM

## 2022-10-24 DIAGNOSIS — I10 HYPERTENSION, ESSENTIAL: ICD-10-CM

## 2022-10-24 DIAGNOSIS — Z87.891 PERSONAL HISTORY OF NICOTINE DEPENDENCE: ICD-10-CM

## 2022-10-24 DIAGNOSIS — N52.01 ERECTILE DYSFUNCTION DUE TO ARTERIAL INSUFFICIENCY: ICD-10-CM

## 2022-10-24 DIAGNOSIS — E78.49 OTHER HYPERLIPIDEMIA: ICD-10-CM

## 2022-10-24 DIAGNOSIS — N40.1 BPH WITH URINARY OBSTRUCTION: Primary | ICD-10-CM

## 2022-10-24 PROBLEM — Z80.42 FAMILY HISTORY OF PROSTATE CANCER: Status: RESOLVED | Noted: 2021-08-05 | Resolved: 2022-10-24

## 2022-10-24 PROCEDURE — 1159F PR MEDICATION LIST DOCUMENTED IN MEDICAL RECORD: ICD-10-PCS | Mod: CPTII,S$GLB,, | Performed by: UROLOGY

## 2022-10-24 PROCEDURE — 3074F SYST BP LT 130 MM HG: CPT | Mod: CPTII,S$GLB,, | Performed by: UROLOGY

## 2022-10-24 PROCEDURE — 99999 PR PBB SHADOW E&M-EST. PATIENT-LVL IV: ICD-10-PCS | Mod: PBBFAC,,, | Performed by: UROLOGY

## 2022-10-24 PROCEDURE — 71271 CT THORAX LUNG CANCER SCR C-: CPT | Mod: 26,,, | Performed by: RADIOLOGY

## 2022-10-24 PROCEDURE — 1160F RVW MEDS BY RX/DR IN RCRD: CPT | Mod: CPTII,S$GLB,, | Performed by: UROLOGY

## 2022-10-24 PROCEDURE — 4010F ACE/ARB THERAPY RXD/TAKEN: CPT | Mod: CPTII,S$GLB,, | Performed by: UROLOGY

## 2022-10-24 PROCEDURE — 1160F PR REVIEW ALL MEDS BY PRESCRIBER/CLIN PHARMACIST DOCUMENTED: ICD-10-PCS | Mod: CPTII,S$GLB,, | Performed by: UROLOGY

## 2022-10-24 PROCEDURE — 99999 PR PBB SHADOW E&M-EST. PATIENT-LVL IV: CPT | Mod: PBBFAC,,, | Performed by: UROLOGY

## 2022-10-24 PROCEDURE — 99204 OFFICE O/P NEW MOD 45 MIN: CPT | Mod: S$GLB,,, | Performed by: UROLOGY

## 2022-10-24 PROCEDURE — 3074F PR MOST RECENT SYSTOLIC BLOOD PRESSURE < 130 MM HG: ICD-10-PCS | Mod: CPTII,S$GLB,, | Performed by: UROLOGY

## 2022-10-24 PROCEDURE — 4010F PR ACE/ARB THEARPY RXD/TAKEN: ICD-10-PCS | Mod: CPTII,S$GLB,, | Performed by: UROLOGY

## 2022-10-24 PROCEDURE — 3078F DIAST BP <80 MM HG: CPT | Mod: CPTII,S$GLB,, | Performed by: UROLOGY

## 2022-10-24 PROCEDURE — 3078F PR MOST RECENT DIASTOLIC BLOOD PRESSURE < 80 MM HG: ICD-10-PCS | Mod: CPTII,S$GLB,, | Performed by: UROLOGY

## 2022-10-24 PROCEDURE — 71271 CT CHEST LUNG SCREENING LOW DOSE: ICD-10-PCS | Mod: 26,,, | Performed by: RADIOLOGY

## 2022-10-24 PROCEDURE — 99204 PR OFFICE/OUTPT VISIT, NEW, LEVL IV, 45-59 MIN: ICD-10-PCS | Mod: S$GLB,,, | Performed by: UROLOGY

## 2022-10-24 PROCEDURE — 1159F MED LIST DOCD IN RCRD: CPT | Mod: CPTII,S$GLB,, | Performed by: UROLOGY

## 2022-10-24 PROCEDURE — 71271 CT THORAX LUNG CANCER SCR C-: CPT | Mod: TC

## 2022-10-24 RX ORDER — FINASTERIDE 5 MG/1
5 TABLET, FILM COATED ORAL DAILY
Qty: 30 TABLET | Refills: 11 | Status: SHIPPED | OUTPATIENT
Start: 2022-10-24 | End: 2023-03-27

## 2022-10-24 NOTE — LETTER
October 24, 2022        Horace Weber MD  [address]  [place] [address]             [facility] Atrium 4th Fl  [address]  [place] [address]  Phone: [phone]   Patient: Nanda Colin III   MR Number: [id number]   YOB: 1965   Date of Visit: 10/24/2022       Dear Dr. Weber:    Thank you for referring Nanda Colin to me for evaluation. Attached you will find relevant portions of my assessment and plan of care.    If you have questions, please do not hesitate to call me. I look forward to following Nanda Colin along with you.    Sincerely,      Jonathan Bledsoe MD            CC  No Recipients    Enclosure

## 2022-10-24 NOTE — PROGRESS NOTES
CHIEF COMPLAINT:    Mr. Colin is a 57 y.o. male presenting for a consultation at the request of Dr. Weber. Patient presents with LUTS.    PRESENTING ILLNESS:    Nanda Colin III is a 57 y.o. male who c/o LUTS.  He has nocturia x 2.  + decreased FOS, + straining, + hesitancy. He's on flomax which initially worked well, but his LUTS have worsened.     He has ED, but it's not bothersome.    REVIEW OF SYSTEMS:    Nanda Colin III denies headache, blurred vision, fever, nausea, vomiting, chills, abdominal pain, chest pain, sore throat, bleeding per rectum, cough, SOB, recent loss of consciousness, recent mental status changes, seizures, dizziness, or upper or lower extremity weakness.    ERIK  1. 1  2. 2  3. 1  4. 1  5. 1      PATIENT HISTORY:    Past Medical History:   Diagnosis Date    BPH (benign prostatic hypertrophy)     Family history of prostate cancer 8/5/2021    brother    Hernia     Hyperlipidemia     Hypertension, essential 12/30/2015    Incarcerated umbilical hernia 6/25/2016    Umbilical hernia        Past Surgical History:   Procedure Laterality Date    AORTOGRAPHY WITH SERIALOGRAPHY N/A 3/15/2022    Procedure: AORTOGRAM, WITH SERIALOGRAPHY;  Surgeon: Ruperto Bay MD;  Location: [facility] OR [address] [place] [address];  Service: Vascular;  Laterality: N/A;  aortogram w/ runoff & PTA,    COLONOSCOPY N/A 1/14/2019    Procedure: COLONOSCOPY;  Surgeon: Fritz Franco MD;  Location: [facility] ENDO ([facility]TH [facility]);  Service: Endoscopy;  Laterality: N/A;  Do not cancel this order    HERNIA REPAIR      TRANSFORAMINAL EPIDURAL INJECTION OF STEROID Right 12/15/2021    Procedure: Injection,steroid,epidural,transforaminal approach Right L4 & L5 (May change depending MRI);  Surgeon: Stacey Mota MD;  Location: [facility] PAIN MGT;  Service: Pain Management;  Laterality: Right;    VASECTOMY      WISDOM TOOTH EXTRACTION         Family History   Problem Relation Age of Onset    Stroke Father     Heart disease Paternal Uncle     Kidney disease  Paternal Uncle     Depression Paternal Uncle     Cancer Maternal Grandfather     COPD Maternal Grandfather     Cancer Paternal Grandmother     COPD Paternal Aunt        Social History     Socioeconomic History    Marital status:      Spouse name: Lakia    Number of children: 2   Occupational History    Occupation: superintendent     Comment:  [employer]     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       Allergies:  Adhesive    Medications:    Current Outpatient Medications:     aspirin (ECOTRIN) 81 MG EC tablet, Take 81 mg by mouth., Disp: , Rfl:     clopidogreL (PLAVIX) 75 mg tablet, Take 1 tablet (75 mg total) by mouth once daily., Disp: 30 tablet, Rfl: 11    clotrimazole (LOTRIMIN) 1 % cream, Apply topically 2 (two) times daily., Disp: 60 g, Rfl: 0    losartan (COZAAR) 25 MG tablet, Take 1 tablet (25 mg total) by mouth once daily., Disp: 90 tablet, Rfl: 3    rosuvastatin (CRESTOR) 20 MG tablet, Take 1 tablet (20 mg total) by mouth once daily., Disp: 90 tablet, Rfl: 1    sildenafiL (VIAGRA) 100 MG tablet, Take 1 tablet (100 mg total) by mouth daily as needed for Erectile Dysfunction., Disp: 30 tablet, Rfl: 2    tamsulosin (FLOMAX) 0.4 mg Cap, Take 1 capsule (0.4 mg total) by mouth once daily., Disp: 90 capsule, Rfl: 3    PHYSICAL EXAMINATION:    The patient generally appears in good health, is appropriately interactive, and is in no apparent distress.     Eyes: anicteric sclerae, moist conjunctivae; no lid-lag; PERRLA     HENT: Atraumatic; oropharynx clear with moist mucous membranes and no mucosal ulcerations;normal hard and soft palate.  No  evidence of lymphadenopathy.    Neck: Trachea midline.  No thyromegaly.    Skin: No lesions.    Mental: Cooperative with normal affect.  Is oriented to time, place, and person.    Neuro: Grossly intact.    Chest: Normal inspiratory effort.   No accessory muscles.  No audible wheezes.  Respirations symmetric on inspiration and expiration.    Heart: Regular rhythm.      Abdomen:  Soft, non-tender. No masses or organomegaly. Bladder is not palpable. No evidence of flank discomfort. No evidence of inguinal hernia.    Genitourinary: The penis is circumcised with no evidence of plaques or induration. The urethral meatus is normal. The testes, epididymides, and cord structures are normal in size and contour bilaterally. The scrotum is normal in size and contour.    Normal anal sphincter tone. No rectal mass.    The prostate is 40 g. Normal landmarks. Lateral sulci. Median furrow intact.  No nodularity or induration. Seminal vesicles are normal.    Extremities: No clubbing, cyanosis, or edema      LABS:      Lab Results   Component Value Date    PSA 2.3 10/10/2022    PSA 2.5 08/03/2021    PSA 2.4 07/14/2020       IMPRESSION:    Encounter Diagnoses   Name Primary?    BPH with urinary obstruction Yes    Erectile dysfunction due to arterial insufficiency     Hypertension, essential     Other hyperlipidemia    HTN, stable  Hyperlipidemia, stable      PLAN:    1. Discussed options for his LUTS.  He's not interested in rezum at this time.  Will add finasteride. Side effects discussed.  A new Rx was given.  Continue flomax.  2. Will observe the ED as it's not bothersome.  3. RTC 6 months.    Copy to: Tia

## 2022-10-28 DIAGNOSIS — I73.9 PAD (PERIPHERAL ARTERY DISEASE): Primary | ICD-10-CM

## 2022-10-31 ENCOUNTER — HOSPITAL ENCOUNTER (OUTPATIENT)
Dept: VASCULAR SURGERY | Facility: CLINIC | Age: 57
Discharge: HOME | End: 2022-10-31
Attending: SURGERY
Payer: COMMERCIAL

## 2022-10-31 ENCOUNTER — LAB VISIT (OUTPATIENT)
Dept: LAB | Facility: HOSPITAL | Age: 57
End: 2022-10-31
Attending: SURGERY
Payer: COMMERCIAL

## 2022-10-31 ENCOUNTER — OFFICE VISIT (OUTPATIENT)
Dept: VASCULAR SURGERY | Facility: CLINIC | Age: 57
End: 2022-10-31
Payer: COMMERCIAL

## 2022-10-31 VITALS
BODY MASS INDEX: 32.32 KG/M2 | WEIGHT: 194 LBS | HEART RATE: 79 BPM | HEIGHT: 65 IN | TEMPERATURE: 99 F | DIASTOLIC BLOOD PRESSURE: 82 MMHG | SYSTOLIC BLOOD PRESSURE: 138 MMHG

## 2022-10-31 DIAGNOSIS — I73.9 PAD (PERIPHERAL ARTERY DISEASE): ICD-10-CM

## 2022-10-31 DIAGNOSIS — I73.9 PAD (PERIPHERAL ARTERY DISEASE): Primary | ICD-10-CM

## 2022-10-31 DIAGNOSIS — Z01.818 PRE-OP EVALUATION: ICD-10-CM

## 2022-10-31 DIAGNOSIS — I77.1 ILIAC ARTERY STENOSIS, BILATERAL: Primary | ICD-10-CM

## 2022-10-31 LAB
BASOPHILS # BLD AUTO: 0.06 K/UL (ref 0–0.2)
BASOPHILS NFR BLD: 0.7 % (ref 0–1.9)
DIFFERENTIAL METHOD: NORMAL
EOSINOPHIL # BLD AUTO: 0.2 K/UL (ref 0–0.5)
EOSINOPHIL NFR BLD: 2.5 % (ref 0–8)
ERYTHROCYTE [DISTWIDTH] IN BLOOD BY AUTOMATED COUNT: 13.9 % (ref 11.5–14.5)
HCT VFR BLD AUTO: 49.7 % (ref 40–54)
HGB BLD-MCNC: 16.6 G/DL (ref 14–18)
IMM GRANULOCYTES # BLD AUTO: 0.03 K/UL (ref 0–0.04)
IMM GRANULOCYTES NFR BLD AUTO: 0.4 % (ref 0–0.5)
LYMPHOCYTES # BLD AUTO: 1.9 K/UL (ref 1–4.8)
LYMPHOCYTES NFR BLD: 22.6 % (ref 18–48)
MCH RBC QN AUTO: 29.1 PG (ref 27–31)
MCHC RBC AUTO-ENTMCNC: 33.4 G/DL (ref 32–36)
MCV RBC AUTO: 87 FL (ref 82–98)
MONOCYTES # BLD AUTO: 0.7 K/UL (ref 0.3–1)
MONOCYTES NFR BLD: 7.9 % (ref 4–15)
NEUTROPHILS # BLD AUTO: 5.6 K/UL (ref 1.8–7.7)
NEUTROPHILS NFR BLD: 65.9 % (ref 38–73)
NRBC BLD-RTO: 0 /100 WBC
PLATELET # BLD AUTO: 286 K/UL (ref 150–450)
PMV BLD AUTO: 10.8 FL (ref 9.2–12.9)
RBC # BLD AUTO: 5.71 M/UL (ref 4.6–6.2)
WBC # BLD AUTO: 8.45 K/UL (ref 3.9–12.7)

## 2022-10-31 PROCEDURE — 99999 PR PBB SHADOW E&M-EST. PATIENT-LVL III: CPT | Mod: PBBFAC,,, | Performed by: SURGERY

## 2022-10-31 PROCEDURE — 93923 PR NON-INVASIVE PHYSIOLOGIC STUDY EXTREMITY 3 LEVELS: ICD-10-PCS | Mod: S$GLB,,, | Performed by: SURGERY

## 2022-10-31 PROCEDURE — 93925 PR DUPLEX LO EXTREM ART BILAT: ICD-10-PCS | Mod: S$GLB,,, | Performed by: SURGERY

## 2022-10-31 PROCEDURE — 99213 PR OFFICE/OUTPT VISIT, EST, LEVL III, 20-29 MIN: ICD-10-PCS | Mod: S$GLB,,, | Performed by: SURGERY

## 2022-10-31 PROCEDURE — 3075F SYST BP GE 130 - 139MM HG: CPT | Mod: CPTII,S$GLB,, | Performed by: SURGERY

## 2022-10-31 PROCEDURE — 3079F DIAST BP 80-89 MM HG: CPT | Mod: CPTII,S$GLB,, | Performed by: SURGERY

## 2022-10-31 PROCEDURE — 3075F PR MOST RECENT SYSTOLIC BLOOD PRESS GE 130-139MM HG: ICD-10-PCS | Mod: CPTII,S$GLB,, | Performed by: SURGERY

## 2022-10-31 PROCEDURE — 99213 OFFICE O/P EST LOW 20 MIN: CPT | Mod: S$GLB,,, | Performed by: SURGERY

## 2022-10-31 PROCEDURE — 1159F PR MEDICATION LIST DOCUMENTED IN MEDICAL RECORD: ICD-10-PCS | Mod: CPTII,S$GLB,, | Performed by: SURGERY

## 2022-10-31 PROCEDURE — 3079F PR MOST RECENT DIASTOLIC BLOOD PRESSURE 80-89 MM HG: ICD-10-PCS | Mod: CPTII,S$GLB,, | Performed by: SURGERY

## 2022-10-31 PROCEDURE — 93923 UPR/LXTR ART STDY 3+ LVLS: CPT | Mod: S$GLB,,, | Performed by: SURGERY

## 2022-10-31 PROCEDURE — 4010F ACE/ARB THERAPY RXD/TAKEN: CPT | Mod: CPTII,S$GLB,, | Performed by: SURGERY

## 2022-10-31 PROCEDURE — 4010F PR ACE/ARB THEARPY RXD/TAKEN: ICD-10-PCS | Mod: CPTII,S$GLB,, | Performed by: SURGERY

## 2022-10-31 PROCEDURE — 85025 COMPLETE CBC W/AUTO DIFF WBC: CPT | Performed by: SURGERY

## 2022-10-31 PROCEDURE — 99999 PR PBB SHADOW E&M-EST. PATIENT-LVL III: ICD-10-PCS | Mod: PBBFAC,,, | Performed by: SURGERY

## 2022-10-31 PROCEDURE — 36415 COLL VENOUS BLD VENIPUNCTURE: CPT | Performed by: SURGERY

## 2022-10-31 PROCEDURE — 1159F MED LIST DOCD IN RCRD: CPT | Mod: CPTII,S$GLB,, | Performed by: SURGERY

## 2022-10-31 PROCEDURE — 93925 LOWER EXTREMITY STUDY: CPT | Mod: S$GLB,,, | Performed by: SURGERY

## 2022-10-31 NOTE — PROGRESS NOTES
VASCULAR SURGERY NOTE    Patient ID: Nanda Colin III is a 57 y.o. male.    I. HISTORY     Chief Complaint: Intermittent claudication     HPI: Nanda Colin III is a 57 y.o. male who is here today for follow up appointment. Patient's primary complaint is right leg pain, characterized as an aching in the posterior calf and numbness to the foot, when he walks for than 1/2 a block, requiring 3-5 minutes of rest for pain to regress. Patient states also has long standing history of back pain, bulging discs, and OA of his lumbar and sacral spine with no significant resolution of symptoms with recent L4-S1 epidural steroid injections. He is a  and this pain is inhibiting his ability to preform daily activities. Laying and rest seem to be the only alleviating factors.  Patient denies pain at rest. He indicates inability to have and maintain an erection, and also endorses pain in the buttock and lower back. He relays current tobacco use, 1 pack a day, for the last 30 years. Patient denies worsening of numbness in the evening in the evening while at rest. Patient also notes an extensive history of heart attack, stroke, and cancer in his family history. He presents in clinic today with new imaging to discuss surgical options.     Interval Hx 10/31/22: Pt comes for follow up evaluation. Patient reports that after the procedure he was able to walk 2 miles without any pain or issues. As of recent claudication symptoms have returned and pain starts after walking 1 block. He continues to take ASA, plavix, statin.     Review of patient's allergies indicates:   Allergen Reactions    Adhesive Hives and Blisters     TEGADERM - Transparent Film       Past Medical History:   Diagnosis Date    BPH (benign prostatic hypertrophy)     Hernia     Hyperlipidemia     Hypertension, essential 12/30/2015    Incarcerated umbilical hernia 6/25/2016    Umbilical hernia      Family History   Problem Relation Age of Onset     Stroke Father     Heart disease Paternal Uncle     Kidney disease Paternal Uncle     Depression Paternal Uncle     Cancer Maternal Grandfather     COPD Maternal Grandfather     Cancer Paternal Grandmother     COPD Paternal Aunt      Social History     Occupational History    Occupation: superintendent     Comment:  [employer]     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      clopidogreL (PLAVIX) 75 mg tablet Take 1 tablet (75 mg total) by mouth once daily. 30 tablet 11    clotrimazole (LOTRIMIN) 1 % cream Apply topically 2 (two) times daily. 60 g 0    finasteride (PROSCAR) 5 mg tablet Take 1 tablet (5 mg total) by mouth once daily. 30 tablet 11    losartan (COZAAR) 25 MG tablet Take 1 tablet (25 mg total) by mouth once daily. 90 tablet 3    rosuvastatin (CRESTOR) 20 MG tablet Take 1 tablet (20 mg total) by mouth once daily. 90 tablet 1    sildenafiL (VIAGRA) 100 MG tablet Take 1 tablet (100 mg total) by mouth daily as needed for Erectile Dysfunction. 30 tablet 2    tamsulosin (FLOMAX) 0.4 mg Cap Take 1 capsule (0.4 mg total) by mouth once daily. 90 capsule 3     No current facility-administered medications on file prior to visit.        Past Surgical History:   Procedure Laterality Date    AORTOGRAPHY WITH SERIALOGRAPHY N/A 3/15/2022    Procedure: AORTOGRAM, WITH SERIALOGRAPHY;  Surgeon: Ruperto Bay MD;  Location: [facility] OR [address] [place] [address];  Service: Vascular;  Laterality: N/A;  aortogram w/ runoff & PTA,    COLONOSCOPY N/A 2019    Procedure: COLONOSCOPY;   Surgeon: Fritz Franco MD;  Location: [facility] (4TH [facility]);  Service: Endoscopy;  Laterality: N/A;  Do not cancel this order    HERNIA REPAIR      TRANSFORAMINAL EPIDURAL INJECTION OF STEROID Right 12/15/2021    Procedure: Injection,steroid,epidural,transforaminal approach Right L4 & L5 (May change depending MRI);  Surgeon: Stacey Mota MD;  [facility];  Service: Pain Management;  Laterality: Right;    VASECTOMY      WISDOM TOOTH EXTRACTION           II. PHYSICAL EXAM     Physical Exam  Constitutional:       General: He is not in acute distress.     Appearance: Normal appearance. He is obese. He is not ill-appearing.   HENT:      Head: Normocephalic and atraumatic.   Eyes:      Extraocular Movements: Extraocular movements intact.      Pupils: Pupils are equal, round, and reactive to light.   Cardiovascular:      Rate and Rhythm: Normal rate.      Pulses:           Femoral pulses are 2+ on the right side and 2+ on the left side.       Dorsalis pedis pulses are 2+ on the right side and 2+ on the left side.        Posterior tibial pulses are 2+ on the right side and 2+ on the left side.   Pulmonary:      Effort: Pulmonary effort is normal. No respiratory distress.      Breath sounds: No wheezing.   Abdominal:      General: Abdomen is flat. There is no distension.      Palpations: Abdomen is soft.      Tenderness: There is no abdominal tenderness.   Musculoskeletal:         General: Tenderness present. No swelling or deformity.      Cervical back: Normal range of motion.      Right lower leg: No edema.      Left lower leg: No edema.   Feet:      Right foot:      Skin integrity: No ulcer or callus.      Toenail Condition: Right toenails are long.      Left foot:      Skin integrity: No ulcer or callus.      Toenail Condition: Left toenails are long.   Skin:     Findings: No erythema, lesion or rash.   Neurological:      General: No focal deficit present.      Mental Status: He is alert and oriented to  person, place, and time.      Motor: No weakness.      Gait: Gait normal.   Psychiatric:         Mood and Affect: Mood normal.         Behavior: Behavior normal.     III. ASSESSMENT & PLAN (MEDICAL DECISION MAKING)       Imaging Results: (I have personally reviewed all images and provided interpretation below)    ABIs 02/14/22:   Right - 1.19; w/ normal waveforms   Left - 0.68; w/ blunted waveforms     CTA 3/3/22:  Impression:     1. 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  Otherwise no significant atherosclerotic disease in the lower extremities as detailed above.  2. Enlarged prostate.    REGLA 10/31/22:  Rt 0.98  Lt 1.08    Lower extremity arterial duplex 10/31.22:  75% stenoses in the proxima segments of the both  Rt SEAN stent and LT SEAN stent.     Assessment/Diagnosis and Plan:      57 y.o. male with symptoms consistent with RLE claudication complicated by radiculopathy. S/p bilateral SEAN kissing stents. Presents with recurrent claudication symptoms after initial improvement of symptoms.     - Plan for endovascular revascularization, angioplasty and possible stents  - Aspirin 81 mg PO daily  - Plavix 75 mg PO daily   - Continue statin

## 2022-10-31 NOTE — H&P (VIEW-ONLY)
VASCULAR SURGERY NOTE    Patient ID: Nanda Colin III is a 57 y.o. male.    I. HISTORY     Chief Complaint: Intermittent claudication     HPI: Nanda Colin III is a 57 y.o. male who is here today for follow up appointment. Patient's primary complaint is right leg pain, characterized as an aching in the posterior calf and numbness to the foot, when he walks for than 1/2 a block, requiring 3-5 minutes of rest for pain to regress. Patient states also has long standing history of back pain, bulging discs, and OA of his lumbar and sacral spine with no significant resolution of symptoms with recent L4-S1 epidural steroid injections. He is a  and this pain is inhibiting his ability to preform daily activities. Laying and rest seem to be the only alleviating factors.  Patient denies pain at rest. He indicates inability to have and maintain an erection, and also endorses pain in the buttock and lower back. He relays current tobacco use, 1 pack a day, for the last 30 years. Patient denies worsening of numbness in the evening in the evening while at rest. Patient also notes an extensive history of heart attack, stroke, and cancer in his family history. He presents in clinic today with new imaging to discuss surgical options.     Interval Hx 10/31/22: Pt comes for follow up evaluation. Patient reports that after the procedure he was able to walk 2 miles without any pain or issues. As of recent claudication symptoms have returned and pain starts after walking 1 block. He continues to take ASA, plavix, statin.     Review of patient's allergies indicates:   Allergen Reactions    Adhesive Hives and Blisters     TEGADERM - Transparent Film       Past Medical History:   Diagnosis Date    BPH (benign prostatic hypertrophy)     Hernia     Hyperlipidemia     Hypertension, essential 12/30/2015    Incarcerated umbilical hernia 6/25/2016    Umbilical hernia      Family History   Problem Relation Age of Onset     Stroke Father     Heart disease Paternal Uncle     Kidney disease Paternal Uncle     Depression Paternal Uncle     Cancer Maternal Grandfather     COPD Maternal Grandfather     Cancer Paternal Grandmother     COPD Paternal Aunt      Social History     Occupational History    Occupation: superintendent     Comment:  [employer]     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      clopidogreL (PLAVIX) 75 mg tablet Take 1 tablet (75 mg total) by mouth once daily. 30 tablet 11    clotrimazole (LOTRIMIN) 1 % cream Apply topically 2 (two) times daily. 60 g 0    finasteride (PROSCAR) 5 mg tablet Take 1 tablet (5 mg total) by mouth once daily. 30 tablet 11    losartan (COZAAR) 25 MG tablet Take 1 tablet (25 mg total) by mouth once daily. 90 tablet 3    rosuvastatin (CRESTOR) 20 MG tablet Take 1 tablet (20 mg total) by mouth once daily. 90 tablet 1    sildenafiL (VIAGRA) 100 MG tablet Take 1 tablet (100 mg total) by mouth daily as needed for Erectile Dysfunction. 30 tablet 2    tamsulosin (FLOMAX) 0.4 mg Cap Take 1 capsule (0.4 mg total) by mouth once daily. 90 capsule 3     No current facility-administered medications on file prior to visit.        Past Surgical History:   Procedure Laterality Date    AORTOGRAPHY WITH SERIALOGRAPHY N/A 3/15/2022    Procedure: AORTOGRAM, WITH SERIALOGRAPHY;  Surgeon: Ruperto Bay MD;  Location: [facility] OR [address] [place] [address];  Service: Vascular;  Laterality: N/A;  aortogram w/ runoff & PTA,    COLONOSCOPY N/A 2019    Procedure: COLONOSCOPY;   Surgeon: Fritz Franco MD;  Location: [facility] (4TH FLR);  Service: Endoscopy;  Laterality: N/A;  Do not cancel this order    HERNIA REPAIR      TRANSFORAMINAL EPIDURAL INJECTION OF STEROID Right 12/15/2021    Procedure: Injection,steroid,epidural,transforaminal approach Right L4 & L5 (May change depending MRI);  Surgeon: Stacey Mota MD;  [facility];  Service: Pain Management;  Laterality: Right;    VASECTOMY      WISDOM TOOTH EXTRACTION           II. PHYSICAL EXAM     Physical Exam  Constitutional:       General: He is not in acute distress.     Appearance: Normal appearance. He is obese. He is not ill-appearing.   HENT:      Head: Normocephalic and atraumatic.   Eyes:      Extraocular Movements: Extraocular movements intact.      Pupils: Pupils are equal, round, and reactive to light.   Cardiovascular:      Rate and Rhythm: Normal rate.      Pulses:           Femoral pulses are 2+ on the right side and 2+ on the left side.       Dorsalis pedis pulses are 2+ on the right side and 2+ on the left side.        Posterior tibial pulses are 2+ on the right side and 2+ on the left side.   Pulmonary:      Effort: Pulmonary effort is normal. No respiratory distress.      Breath sounds: No wheezing.   Abdominal:      General: Abdomen is flat. There is no distension.      Palpations: Abdomen is soft.      Tenderness: There is no abdominal tenderness.   Musculoskeletal:         General: Tenderness present. No swelling or deformity.      Cervical back: Normal range of motion.      Right lower leg: No edema.      Left lower leg: No edema.   Feet:      Right foot:      Skin integrity: No ulcer or callus.      Toenail Condition: Right toenails are long.      Left foot:      Skin integrity: No ulcer or callus.      Toenail Condition: Left toenails are long.   Skin:     Findings: No erythema, lesion or rash.   Neurological:      General: No focal deficit present.      Mental Status: He is alert and oriented to  person, place, and time.      Motor: No weakness.      Gait: Gait normal.   Psychiatric:         Mood and Affect: Mood normal.         Behavior: Behavior normal.     III. ASSESSMENT & PLAN (MEDICAL DECISION MAKING)       Imaging Results: (I have personally reviewed all images and provided interpretation below)    ABIs 02/14/22:   Right - 1.19; w/ normal waveforms   Left - 0.68; w/ blunted waveforms     CTA 3/3/22:  Impression:     1. 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  Otherwise no significant atherosclerotic disease in the lower extremities as detailed above.  2. Enlarged prostate.    REGLA 10/31/22:  Rt 0.98  Lt 1.08    Lower extremity arterial duplex 10/31.22:  75% stenoses in the proxima segments of the both  Rt SEAN stent and LT SEAN stent.     Assessment/Diagnosis and Plan:      57 y.o. male with symptoms consistent with RLE claudication complicated by radiculopathy. S/p bilateral SEAN kissing stents. Presents with recurrent claudication symptoms after initial improvement of symptoms.     - Plan for endovascular revascularization, angioplasty and possible stents  - Aspirin 81 mg PO daily  - Plavix 75 mg PO daily   - Continue statin

## 2022-11-03 DIAGNOSIS — E78.5 HYPERLIPIDEMIA, UNSPECIFIED HYPERLIPIDEMIA TYPE: ICD-10-CM

## 2022-11-03 RX ORDER — PRAVASTATIN SODIUM 80 MG/1
TABLET ORAL
Qty: 90 TABLET | Refills: 0 | OUTPATIENT
Start: 2022-11-03

## 2022-11-03 NOTE — TELEPHONE ENCOUNTER
No new care gaps identified.  [facility] Embedded Care Gaps. Reference number: [id number]. 11/03/2022   1:41:53 AM RAMONITAT

## 2022-11-03 NOTE — TELEPHONE ENCOUNTER
Refill Decision Note   Nanda Colin  is requesting a refill authorization.  Brief Assessment and Rationale for Refill:  Quick Discontinue    -Medication-Related Problems Identified: Dose adjustment  Medication Therapy Plan:  PATIENT WAS SWITCHED TO CRESTOR 20MG ON 10/17/2022    Medication Reconciliation Completed: No   Comments:     No Care Gaps recommended.     Note composed:3:41 PM 11/03/2022

## 2022-11-08 ENCOUNTER — TELEPHONE (OUTPATIENT)
Dept: VASCULAR SURGERY | Facility: CLINIC | Age: 57
End: 2022-11-08
Payer: COMMERCIAL

## 2022-11-08 ENCOUNTER — ANESTHESIA EVENT (OUTPATIENT)
Dept: SURGERY | Facility: HOSPITAL | Age: 57
End: 2022-11-08
Payer: COMMERCIAL

## 2022-11-08 NOTE — ANESTHESIA PREPROCEDURE EVALUATION
[facility]  Anesthesia Pre-Operative Evaluation         Patient Name: Nanda Colin III  YOB: 1965  MRN: [id number]    SUBJECTIVE:     Pre-operative evaluation for Procedure(s) (LRB):  Angiogram Extremity Unilateral (Bilateral)  Scheduled for 11/9/2022    HPI 11/08/2022:  Nanda Colin III is a 57 y.o. male with hx of HTN, HLD, PAD, and GINNA. S/p bilateral SEAN kissing stents for RLE claudication. Now with recurrent symptoms.    Presents for above procedure.      Prev airway:   None on file    Oxygen/Ventilation Requirements:  On room air        Patient Active Problem List   Diagnosis    Erectile dysfunction due to arterial insufficiency    Hypertension, essential    Hyperlipidemia    Colon polyps    BPH with urinary obstruction    Gastroesophageal reflux disease    GINNA (obstructive sleep apnea)    Laceration of left hand without foreign body    Lumbar radiculopathy    PAD (peripheral artery disease)    Right leg pain    Personal history of nicotine dependence    Coronary artery calcification seen on CT scan    Lung granuloma       Review of patient's allergies indicates:   Allergen Reactions    Adhesive Hives and Blisters     TEGADERM - Transparent Film       Outpatient Medications:  No current facility-administered medications on file prior to encounter.     Current Outpatient Medications on File Prior to Encounter   Medication Sig Dispense Refill    finasteride (PROSCAR) 5 mg tablet Take 1 tablet (5 mg total) by mouth once daily. 30 tablet 11    aspirin (ECOTRIN) 81 MG EC tablet Take 81 mg by mouth.      clopidogreL (PLAVIX) 75 mg tablet Take 1 tablet (75 mg total) by mouth once daily. 30 tablet 11    clotrimazole (LOTRIMIN) 1 % cream Apply topically 2 (two) times daily. 60 g 0    losartan (COZAAR) 25 MG tablet Take 1 tablet (25 mg total) by mouth once daily. 90 tablet 3    rosuvastatin (CRESTOR) 20 MG tablet Take 1 tablet (20 mg total) by mouth once  daily. 90 tablet 1    sildenafiL (VIAGRA) 100 MG tablet Take 1 tablet (100 mg total) by mouth daily as needed for Erectile Dysfunction. 30 tablet 2    tamsulosin (FLOMAX) 0.4 mg Cap Take 1 capsule (0.4 mg total) by mouth once daily. 90 capsule 3        Current Inpatient Medications:      Past Surgical History:   Procedure Laterality Date    AORTOGRAPHY WITH SERIALOGRAPHY N/A 3/15/2022    Procedure: AORTOGRAM, WITH SERIALOGRAPHY;  Surgeon: Rueprto Bay MD;  Location: [facility] OR [facility] FLR;  Service: Vascular;  Laterality: N/A;  aortogram w/ runoff & PTA,    COLONOSCOPY N/A 2019    Procedure: COLONOSCOPY;  Surgeon: Fritz Franco MD;  Location: [facility] ENDO (4TH FLR);  Service: Endoscopy;  Laterality: N/A;  Do not cancel this order    HERNIA REPAIR      TRANSFORAMINAL EPIDURAL INJECTION OF STEROID Right 12/15/2021    Procedure: Injection,steroid,epidural,transforaminal approach Right L4 & L5 (May change depending MRI);  Surgeon: Stacey Mota MD;  Location: [facility] PAIN T;  Service: Pain Management;  Laterality: Right;    VASECTOMY      WISDOM TOOTH EXTRACTION         Social History     Socioeconomic History    Marital status:      Spouse name: Lakia    Number of children: 2   Occupational History    Occupation: superintendent     Comment:  [employer]     Employer: [employer]   Tobacco Use    Smoking status: Former     Types: Cigarettes     Quit date: 10/15/2017     Years since quittin.0    Smokeless tobacco: Never   Substance and Sexual Activity    Alcohol use: Yes     Alcohol/week: 0.0 standard drinks     Comment: socially    Drug use: No    Sexual activity: Yes     Partners: Female   Social History Narrative    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       OBJECTIVE:     Weight:  Wt Readings from Last 1 Encounters:   10/31/22 88 kg (194 lb 0.1 oz)     There is no height or weight on file to calculate BMI.    Recent Blood Pressure Readings:  BP Readings from Last 3 Encounters:    10/31/22 138/82   10/24/22 118/79   10/17/22 119/76       Vital Signs Range (Last 24H):         CBC:   Lab Results   Component Value Date    WBC 8.45 10/31/2022    HGB 16.6 10/31/2022    HCT 49.7 10/31/2022    MCV 87 10/31/2022     10/31/2022       CMP:     Chemistry        Component Value Date/Time     10/10/2022 0750    K 4.2 10/10/2022 0750     10/10/2022 0750    CO2 26 10/10/2022 0750    BUN 9 10/10/2022 0750    CREATININE 0.8 10/10/2022 0750     10/10/2022 0750        Component Value Date/Time    CALCIUM 9.9 10/10/2022 0750    ALKPHOS 77 10/10/2022 0750    AST 22 10/10/2022 0750    ALT 29 10/10/2022 0750    BILITOT 1.1 (H) 10/10/2022 0750    ESTGFRAFRICA >60.0 02/14/2022 1704    EGFRNONAA >60.0 02/14/2022 1704            INR:  No results found for: INR, PROTIME    Diagnostic Studies:      EKG:    Results for orders placed or performed during the hospital encounter of 07/14/20   EKG 12-lead    Collection Time: 07/14/20  8:32 AM    Narrative    Test Reason : Z00.00,R00.0,    Vent. Rate : 070 BPM     Atrial Rate : 070 BPM     P-R Int : 164 ms          QRS Dur : 094 ms      QT Int : 382 ms       P-R-T Axes : 051 027 028 degrees     QTc Int : 412 ms    Normal sinus rhythm  Normal ECG  When compared with ECG of 25-JUL-2014 08:52,  No significant change was found  Confirmed by ALICIA EDMONDS MD ([id number]) on 7/14/2020 9:08:48 AM    Referred By: SHERRIE CHOI           Confirmed By:ALICIA EDMONDS MD       2D Echo:    No results found for this or any previous visit.        ASSESSMENT/PLAN:           Pre-op Assessment    I have reviewed the Patient Summary Reports.    I have reviewed the NPO Status.      Review of Systems  Social:  Former Smoker, Alcohol Use    Cardiovascular:   Hypertension CAD   PVD hyperlipidemia    Pulmonary:   Sleep Apnea    Hepatic/GI:   GERD    Neurological:   Denies CVA. Neuromuscular Disease,     Endocrine:   Denies Diabetes.  Obesity / BMI > 30      Physical  Exam  General: Well nourished, Cooperative, Alert and Oriented    Airway:  Mallampati: III / II  Mouth Opening: Normal  Neck ROM: Normal ROM    Dental:  Intact        Anesthesia Plan  Type of Anesthesia, risks & benefits discussed:    Anesthesia Type: Gen Natural Airway, MAC  Intra-op Monitoring Plan: Standard ASA Monitors  Post Op Pain Control Plan: multimodal analgesia and IV/PO Opioids PRN  Induction:  IV  Informed Consent: Informed consent signed with the Patient and all parties understand the risks and agree with anesthesia plan.  All questions answered.   ASA Score: 3  Day of Surgery Review of History & Physical: H&P Update referred to the surgeon/provider.    Ready For Surgery From Anesthesia Perspective.     .

## 2022-11-09 ENCOUNTER — ANESTHESIA (OUTPATIENT)
Dept: SURGERY | Facility: HOSPITAL | Age: 57
End: 2022-11-09
Payer: COMMERCIAL

## 2022-11-09 ENCOUNTER — HOSPITAL ENCOUNTER (OUTPATIENT)
Facility: HOSPITAL | Age: 57
Discharge: HOME | End: 2022-11-09
Attending: SURGERY | Admitting: SURGERY
Payer: COMMERCIAL

## 2022-11-09 VITALS
BODY MASS INDEX: 31.65 KG/M2 | WEIGHT: 190 LBS | HEART RATE: 60 BPM | HEIGHT: 65 IN | TEMPERATURE: 98 F | SYSTOLIC BLOOD PRESSURE: 154 MMHG | RESPIRATION RATE: 12 BRPM | OXYGEN SATURATION: 97 % | DIASTOLIC BLOOD PRESSURE: 80 MMHG

## 2022-11-09 DIAGNOSIS — I73.9 PERIPHERAL VASCULAR DISEASE: Primary | ICD-10-CM

## 2022-11-09 PROCEDURE — D9220A PRA ANESTHESIA: Mod: ,,, | Performed by: ANESTHESIOLOGY

## 2022-11-09 PROCEDURE — C1769 GUIDE WIRE: HCPCS | Performed by: SURGERY

## 2022-11-09 PROCEDURE — 71000016 HC POSTOP RECOV ADDL HR: Performed by: SURGERY

## 2022-11-09 PROCEDURE — 36000706: Performed by: SURGERY

## 2022-11-09 PROCEDURE — 37000009 HC ANESTHESIA EA ADD 15 MINS: Performed by: SURGERY

## 2022-11-09 PROCEDURE — 71000015 HC POSTOP RECOV 1ST HR: Performed by: SURGERY

## 2022-11-09 PROCEDURE — 63600175 PHARM REV CODE 636 W HCPCS: Performed by: STUDENT IN AN ORGANIZED HEALTH CARE EDUCATION/TRAINING PROGRAM

## 2022-11-09 PROCEDURE — C1874 STENT, COATED/COV W/DEL SYS: HCPCS | Performed by: SURGERY

## 2022-11-09 PROCEDURE — 37221 PR REVASCULARIZE ILIAC ARTERY,ANGIOPLASTY/STENT, INITIAL VESSEL: CPT | Mod: 50,,, | Performed by: SURGERY

## 2022-11-09 PROCEDURE — 37221 PR REVASCULARIZE ILIAC ARTERY,ANGIOPLASTY/STENT, INITIAL VESSEL: ICD-10-PCS | Mod: 50,,, | Performed by: SURGERY

## 2022-11-09 PROCEDURE — 63600175 PHARM REV CODE 636 W HCPCS: Performed by: SURGERY

## 2022-11-09 PROCEDURE — 25000003 PHARM REV CODE 250: Performed by: SURGERY

## 2022-11-09 PROCEDURE — 75716 PR  ANGIO EXTERMITY BILAT: ICD-10-PCS | Mod: 26,59,, | Performed by: SURGERY

## 2022-11-09 PROCEDURE — 37000008 HC ANESTHESIA 1ST 15 MINUTES: Performed by: SURGERY

## 2022-11-09 PROCEDURE — C1894 INTRO/SHEATH, NON-LASER: HCPCS | Performed by: SURGERY

## 2022-11-09 PROCEDURE — 27201423 OPTIME MED/SURG SUP & DEVICES STERILE SUPPLY: Performed by: SURGERY

## 2022-11-09 PROCEDURE — D9220A PRA ANESTHESIA: ICD-10-PCS | Mod: ,,, | Performed by: ANESTHESIOLOGY

## 2022-11-09 PROCEDURE — 25000003 PHARM REV CODE 250: Performed by: STUDENT IN AN ORGANIZED HEALTH CARE EDUCATION/TRAINING PROGRAM

## 2022-11-09 PROCEDURE — 25500020 PHARM REV CODE 255: Performed by: SURGERY

## 2022-11-09 PROCEDURE — 36000707: Performed by: SURGERY

## 2022-11-09 PROCEDURE — 75716 ARTERY X-RAYS ARMS/LEGS: CPT | Mod: 26,59,, | Performed by: SURGERY

## 2022-11-09 PROCEDURE — C1760 CLOSURE DEV, VASC: HCPCS | Performed by: SURGERY

## 2022-11-09 PROCEDURE — 71000044 HC DOSC ROUTINE RECOVERY FIRST HOUR: Performed by: SURGERY

## 2022-11-09 DEVICE — VIABAHN BX BALLOON EXP ENDO 7MMX29MM 7FR 80CMCATH HEPARIN
Type: IMPLANTABLE DEVICE | Site: ARTERIAL | Status: FUNCTIONAL
Brand: GORE VIABAHN VBX BALLOON EXPANDABLE ENDO

## 2022-11-09 RX ORDER — CEFAZOLIN SODIUM/WATER 2 G/20 ML
SYRINGE (ML) INTRAVENOUS
Status: DISCONTINUED | OUTPATIENT
Start: 2022-11-09 | End: 2022-11-09

## 2022-11-09 RX ORDER — ONDANSETRON 2 MG/ML
4 INJECTION INTRAMUSCULAR; INTRAVENOUS DAILY PRN
Status: DISCONTINUED | OUTPATIENT
Start: 2022-11-09 | End: 2022-11-09 | Stop reason: HOSPADM

## 2022-11-09 RX ORDER — ACETAMINOPHEN 325 MG/1
650 TABLET ORAL EVERY 4 HOURS PRN
Status: DISCONTINUED | OUTPATIENT
Start: 2022-11-09 | End: 2022-11-09 | Stop reason: HOSPADM

## 2022-11-09 RX ORDER — PROTAMINE SULFATE 10 MG/ML
INJECTION, SOLUTION INTRAVENOUS
Status: DISCONTINUED | OUTPATIENT
Start: 2022-11-09 | End: 2022-11-09

## 2022-11-09 RX ORDER — HEPARIN SODIUM 1000 [USP'U]/ML
INJECTION, SOLUTION INTRAVENOUS; SUBCUTANEOUS
Status: DISCONTINUED | OUTPATIENT
Start: 2022-11-09 | End: 2022-11-09 | Stop reason: HOSPADM

## 2022-11-09 RX ORDER — PROPOFOL 10 MG/ML
INJECTION, EMULSION INTRAVENOUS CONTINUOUS PRN
Status: DISCONTINUED | OUTPATIENT
Start: 2022-11-09 | End: 2022-11-09

## 2022-11-09 RX ORDER — HYDROMORPHONE HYDROCHLORIDE 1 MG/ML
0.2 INJECTION, SOLUTION INTRAMUSCULAR; INTRAVENOUS; SUBCUTANEOUS EVERY 5 MIN PRN
Status: DISCONTINUED | OUTPATIENT
Start: 2022-11-09 | End: 2022-11-09 | Stop reason: HOSPADM

## 2022-11-09 RX ORDER — HEPARIN SODIUM 1000 [USP'U]/ML
INJECTION, SOLUTION INTRAVENOUS; SUBCUTANEOUS
Status: DISCONTINUED | OUTPATIENT
Start: 2022-11-09 | End: 2022-11-09

## 2022-11-09 RX ORDER — LIDOCAINE HYDROCHLORIDE 10 MG/ML
INJECTION, SOLUTION EPIDURAL; INFILTRATION; INTRACAUDAL; PERINEURAL
Status: DISCONTINUED | OUTPATIENT
Start: 2022-11-09 | End: 2022-11-09 | Stop reason: HOSPADM

## 2022-11-09 RX ORDER — SODIUM CHLORIDE 9 MG/ML
INJECTION, SOLUTION INTRAVENOUS CONTINUOUS
Status: DISCONTINUED | OUTPATIENT
Start: 2022-11-09 | End: 2022-11-09 | Stop reason: HOSPADM

## 2022-11-09 RX ORDER — FENTANYL CITRATE 50 UG/ML
INJECTION, SOLUTION INTRAMUSCULAR; INTRAVENOUS
Status: DISCONTINUED | OUTPATIENT
Start: 2022-11-09 | End: 2022-11-09

## 2022-11-09 RX ORDER — IODIXANOL 320 MG/ML
INJECTION, SOLUTION INTRAVASCULAR
Status: DISCONTINUED | OUTPATIENT
Start: 2022-11-09 | End: 2022-11-09 | Stop reason: HOSPADM

## 2022-11-09 RX ORDER — ONDANSETRON 8 MG/1
8 TABLET, ORALLY DISINTEGRATING ORAL EVERY 8 HOURS PRN
Status: DISCONTINUED | OUTPATIENT
Start: 2022-11-09 | End: 2022-11-09 | Stop reason: HOSPADM

## 2022-11-09 RX ORDER — MIDAZOLAM HYDROCHLORIDE 1 MG/ML
INJECTION, SOLUTION INTRAMUSCULAR; INTRAVENOUS
Status: DISCONTINUED | OUTPATIENT
Start: 2022-11-09 | End: 2022-11-09

## 2022-11-09 RX ORDER — PROPOFOL 10 MG/ML
VIAL (ML) INTRAVENOUS
Status: DISCONTINUED | OUTPATIENT
Start: 2022-11-09 | End: 2022-11-09

## 2022-11-09 RX ORDER — DEXMEDETOMIDINE HYDROCHLORIDE 100 UG/ML
INJECTION, SOLUTION INTRAVENOUS
Status: DISCONTINUED | OUTPATIENT
Start: 2022-11-09 | End: 2022-11-09

## 2022-11-09 RX ADMIN — DEXMEDETOMIDINE HYDROCHLORIDE 8 MCG: 100 INJECTION, SOLUTION INTRAVENOUS at 10:11

## 2022-11-09 RX ADMIN — MIDAZOLAM 2 MG: 1 INJECTION INTRAMUSCULAR; INTRAVENOUS at 10:11

## 2022-11-09 RX ADMIN — PROTAMINE SULFATE 5 MG: 10 INJECTION, SOLUTION INTRAVENOUS at 11:11

## 2022-11-09 RX ADMIN — PROTAMINE SULFATE 30 MG: 10 INJECTION, SOLUTION INTRAVENOUS at 11:11

## 2022-11-09 RX ADMIN — PROPOFOL 10 MCG/KG/MIN: 10 INJECTION, EMULSION INTRAVENOUS at 10:11

## 2022-11-09 RX ADMIN — SODIUM CHLORIDE: 0.9 INJECTION, SOLUTION INTRAVENOUS at 09:11

## 2022-11-09 RX ADMIN — HEPARIN SODIUM 7000 UNITS: 1000 INJECTION, SOLUTION INTRAVENOUS; SUBCUTANEOUS at 10:11

## 2022-11-09 RX ADMIN — FENTANYL CITRATE 25 MCG: 0.05 INJECTION, SOLUTION INTRAMUSCULAR; INTRAVENOUS at 11:11

## 2022-11-09 RX ADMIN — FENTANYL CITRATE 25 MCG: 0.05 INJECTION, SOLUTION INTRAMUSCULAR; INTRAVENOUS at 10:11

## 2022-11-09 RX ADMIN — PROPOFOL 30 MG: 10 INJECTION, EMULSION INTRAVENOUS at 10:11

## 2022-11-09 RX ADMIN — Medication 2 G: at 10:11

## 2022-11-09 NOTE — PLAN OF CARE
Patient tolerated procedure and anesthesia with no complaints of pain or nausea. Discharge material given and explained to patient and spouse. Both verbalized understanding. Patient completed bed rest and walked with no groin bleeding or hematoma. Patient wheeled to ride in stable condition with no complaints.

## 2022-11-09 NOTE — BRIEF OP NOTE
[facility] - Surgery ([facility])  Brief Operative Note    Surgery Date: 11/9/2022     Surgeon(s) and Role:     * Ruperto Bay MD - Primary     * Robert Hurtado MD - Fellow    Assisting Surgeon: None    Pre-op Diagnosis:  Iliac artery stenosis, bilateral [I77.1]    Post-op Diagnosis:  Post-Op Diagnosis Codes:     * Iliac artery stenosis, bilateral [I77.1]    Procedure(s) (LRB):  Aortogram  Angiogram Extremity Unilateral (Bilateral)  3. Right Common Iliac Artery stent 7 x 29 mm VBX  4. Left Common Iliac Artery Stent 8 x 29 mm VBX  5. 7Fr Mynx closure device for bilateral common femoral artery    Anesthesia: Local MAC    Operative Findings: Cephalad portion of preexisting kissing common iliac artery stents with stenosis. Extended into the Aorta with bilateral kissing VBX stents. No residual stenosis seen. Both iliacs and hypos fill appropriately     Estimated Blood Loss: * No values recorded between 11/9/2022 10:17 AM and 11/9/2022 11:20 AM *         Specimens:   Specimen (24h ago, onward)      None              Discharge Note    OUTCOME: Patient tolerated treatment/procedure well without complication and is now ready for discharge.    DISPOSITION: Home or Self Care    FINAL DIAGNOSIS:  <principal problem not specified>    FOLLOWUP: In clinic    DISCHARGE INSTRUCTIONS:    Discharge Procedure Orders   Diet general     Robert Hurtado MD PGY7  Vascular Surgery Fellow  [phone]

## 2022-11-09 NOTE — ANESTHESIA POSTPROCEDURE EVALUATION
Anesthesia Post Evaluation    Patient: Nanda Colin III    Procedure(s) Performed: Procedure(s) (LRB):  Angiogram Extremity Unilateral (Bilateral)  AORTOGRAM (N/A)  STENT, ILIAC ARTERY (Bilateral)    Final Anesthesia Type: MAC      Patient location during evaluation: PACU  Patient participation: Yes- Able to Participate  Level of consciousness: awake and alert and oriented  Post-procedure vital signs: reviewed and stable  Pain management: adequate  Airway patency: patent    PONV status at discharge: No PONV  Anesthetic complications: no      Cardiovascular status: hemodynamically stable  Respiratory status: unassisted, spontaneous ventilation and room air  Hydration status: euvolemic  Follow-up not needed.          Vitals Value Taken Time   /89 11/09/22 1301   Temp 36.7 °C (98 °F) 11/09/22 1125   Pulse 56 11/09/22 1308   Resp 14 11/09/22 1308   SpO2 96 % 11/09/22 1308   Vitals shown include unvalidated device data.      No case tracking events are documented in the log.      Pain/Fani Score: Fani Score: 10 (11/9/2022 12:25 PM)

## 2022-11-09 NOTE — TRANSFER OF CARE
"Anesthesia Transfer of Care Note    Patient: Nanda Colin III    Procedure(s) Performed: Procedure(s) (LRB):  Angiogram Extremity Unilateral (Bilateral)  AORTOGRAM (N/A)  STENT, ILIAC ARTERY (Bilateral)    Patient location: PACU    Anesthesia Type: MAC    Transport from OR: Transported from OR on 6-10 L/min O2 by face mask with adequate spontaneous ventilation    Post pain: adequate analgesia    Post assessment: no apparent anesthetic complications and tolerated procedure well    Post vital signs: stable    Level of consciousness: awake and alert    Nausea/Vomiting: no nausea/vomiting    Complications: none    Transfer of care protocol was followed      Last vitals:   Visit Vitals  /88 (BP Location: Right arm, Patient Position: Lying)   Pulse 72   Temp 36.9 °C (98.4 °F) (Oral)   Resp 18   Ht 5' 5" (1.651 m)   Wt 86.2 kg (190 lb)   SpO2 97%   BMI 31.62 kg/m²     "

## 2022-11-10 NOTE — OP NOTE
Surgery Date: 11/9/2022      Surgeon(s) and Role:     * Ruperto Bay MD - Primary     * Robert Hurtado MD - Fellow     Assisting Surgeon: None     Pre-op Diagnosis:  Iliac artery stenosis, bilateral [I77.1]     Post-op Diagnosis:  Post-Op Diagnosis Codes:     * Iliac artery stenosis, bilateral [I77.1]     Procedure(s) (LRB):  Aortogram  Angiogram Extremity Unilateral (Bilateral)  3. Right Common Iliac Artery stent 7 x 29 mm VBX  4. Left Common Iliac Artery Stent 8 x 29 mm VBX  5. 7Fr Mynx closure device for bilateral common femoral artery     Anesthesia: Local MAC     Operative Findings: Cephalad portion of preexisting kissing common iliac artery stents with stenosis. Extended into the Aorta with bilateral kissing VBX stents. No residual stenosis seen. Both iliacs and hypos fill appropriately      Estimated Blood Loss: * No values recorded between 11/9/2022 10:17 AM and 11/9/2022 11:20 AM *         Specimens:   Specimen (24h ago, onward)        None              Pre-op Diagnosis: Severe peripheral arterial dis    Complications:  None; patient tolerated the procedure well.    Disposition: Recoery- hemodynamically stable in good condition    Attending Attestation: I was present and scrubbed for the entire procedure.  After an informed consent was obtained the patient was brought to the interventional suite and placed in the supine position. Both the patient and procedure were confirmed and identified during timeout process. The patient received perioperative antibiotics. The patient's bilateral groins were prepped and draped in usual sterile fashion. Using ultrasound guidance, the right and left common femoral artery vessel patency was confirmed. Then direct ultrasound guidance the right and left CFA was entered with a 21-G needle, Mandril wire and 4-Fr micropuncture needle. Then under fluoroscopic guidance, an 0.035-in wire was placed into the distal aorta. The micropuncture dilator was then exchanged over the  wire for a 5-Tajik sheath. Sheathogram demonstrated access in the CFA. Next a VCF-catheter was placed over the wire and into the distal aorta under fluoroscopy and an aortogram right and left leg was performed by selecting the distal aorta demonstrated:  Aorto-iliac vessels: stenosis of proximal Iliac stents and proximal iliacs  Left and right Common femoral, profunda femoral arteries: Patent  NA Superficial femoral artery: NA  NA Popliteal artery: NA  Tibials: NA  Based on the images from this diagnostic angio, a decision was made to intervene. We then systemically heparinized the patient with 8000u of heparin.   Next, we placed a up-and-over sheath and used a 0.035-inch Glidewire Advantage hybdrid wire to selected the Aorta. Treatment of Of both iliacs was performed artery was done with 7 x 29 mm VBX on the right common iliac artery and a 8 x 29 mm VBX for the left common iliac artery . A follow-up angiogram showed resolution of the lesions. Heparin was reversed with protamine. We then deployed a 7-Tajik Mynx closure device without issue. At the conclusion of the case the patient was noted to have no evidence of a groin hematoma. All instrument and sponge counts were correct at the end of the case. The patient tolerated the procedure well and was transferred to the pacu for further recovery.     Robert Hurtado MD PGY7  Vascular Surgery Fellow  [phone]

## 2022-11-18 ENCOUNTER — TELEPHONE (OUTPATIENT)
Dept: ENDOSCOPY | Facility: HOSPITAL | Age: 57
End: 2022-11-18

## 2022-11-18 ENCOUNTER — CLINICAL SUPPORT (OUTPATIENT)
Dept: ENDOSCOPY | Facility: HOSPITAL | Age: 57
End: 2022-11-18
Attending: FAMILY MEDICINE
Payer: COMMERCIAL

## 2022-11-18 VITALS — BODY MASS INDEX: 31.65 KG/M2 | WEIGHT: 190 LBS | HEIGHT: 65 IN

## 2022-11-18 DIAGNOSIS — K63.5 POLYP OF COLON, UNSPECIFIED PART OF COLON, UNSPECIFIED TYPE: ICD-10-CM

## 2022-11-18 DIAGNOSIS — Z12.11 COLON CANCER SCREENING: ICD-10-CM

## 2022-11-18 NOTE — TELEPHONE ENCOUNTER
Hi,    Patient has order for a Colonoscopy. He is on Plavix. Can he hold his Plavix for 5 days prior per our Endoscopy Protocol? Please advise.    Sincerely,  PA Ly

## 2022-11-18 NOTE — PLAN OF CARE
Patient is on Plavix that is managed by Dr. Bay. Awaiting approval to hold for 5 days.  Patient aware

## 2022-11-22 NOTE — TELEPHONE ENCOUNTER
Adriana Lacey RN  You 4 days ago     ME  Dr. Bay says he may hold his Plavix.      Adriana Lacey RN  You 4 days ago     ME  Dr. Bay is out of the clinic and won't return until Monday. I've forwarded the message to him and will contact you with his response.   Adriana

## 2022-11-26 ENCOUNTER — TELEPHONE (OUTPATIENT)
Dept: INTERNAL MEDICINE | Facility: CLINIC | Age: 57
End: 2022-11-26
Payer: COMMERCIAL

## 2022-11-26 DIAGNOSIS — Z87.891 PERSONAL HISTORY OF NICOTINE DEPENDENCE: Primary | ICD-10-CM

## 2022-11-26 DIAGNOSIS — I25.10 CORONARY ARTERY CALCIFICATION SEEN ON CT SCAN: ICD-10-CM

## 2022-11-26 DIAGNOSIS — R59.0 MEDIASTINAL ADENOPATHY: ICD-10-CM

## 2022-11-27 NOTE — TELEPHONE ENCOUNTER
Please CALL patient and report that there were no new concerning areas on chest CT scan. Any previously reported abnormalities remain unchanged or stable. I recommend a repeat scan in 1 year to continue surveillance.     Please schedule follow up scan in 1 year - see orders. Thank you    Please call patient and explain that the tests show borderline lymph node enlargement.    I would like to refer patient to the pulmonary department to review CT scan and see if anything other than periodic follow up is needed..    Please see referral orders and please call patient to schedule.     Thank you.

## 2022-11-29 NOTE — TELEPHONE ENCOUNTER
Spoke to patient and advised of ct results and all recommendations. Patient verbalized understanding.    Appt scheduled

## 2022-12-12 ENCOUNTER — HOSPITAL ENCOUNTER (OUTPATIENT)
Dept: VASCULAR SURGERY | Facility: CLINIC | Age: 57
Discharge: HOME | End: 2022-12-12
Attending: SURGERY
Payer: COMMERCIAL

## 2022-12-12 ENCOUNTER — TELEPHONE (OUTPATIENT)
Dept: VASCULAR SURGERY | Facility: CLINIC | Age: 57
End: 2022-12-12
Payer: COMMERCIAL

## 2022-12-12 ENCOUNTER — CLINICAL SUPPORT (OUTPATIENT)
Dept: ENDOSCOPY | Facility: HOSPITAL | Age: 57
End: 2022-12-12
Attending: FAMILY MEDICINE
Payer: COMMERCIAL

## 2022-12-12 ENCOUNTER — OFFICE VISIT (OUTPATIENT)
Dept: VASCULAR SURGERY | Facility: CLINIC | Age: 57
End: 2022-12-12
Payer: COMMERCIAL

## 2022-12-12 VITALS
DIASTOLIC BLOOD PRESSURE: 83 MMHG | TEMPERATURE: 98 F | WEIGHT: 198.44 LBS | HEART RATE: 81 BPM | SYSTOLIC BLOOD PRESSURE: 136 MMHG | HEIGHT: 65 IN | BODY MASS INDEX: 33.06 KG/M2

## 2022-12-12 DIAGNOSIS — I73.9 PAD (PERIPHERAL ARTERY DISEASE): ICD-10-CM

## 2022-12-12 DIAGNOSIS — I73.9 PAD (PERIPHERAL ARTERY DISEASE): Primary | ICD-10-CM

## 2022-12-12 DIAGNOSIS — Z12.11 COLON CANCER SCREENING: ICD-10-CM

## 2022-12-12 DIAGNOSIS — K63.5 POLYP OF COLON, UNSPECIFIED PART OF COLON, UNSPECIFIED TYPE: ICD-10-CM

## 2022-12-12 PROCEDURE — 1159F PR MEDICATION LIST DOCUMENTED IN MEDICAL RECORD: ICD-10-PCS | Mod: CPTII,S$GLB,, | Performed by: SURGERY

## 2022-12-12 PROCEDURE — 3075F PR MOST RECENT SYSTOLIC BLOOD PRESS GE 130-139MM HG: ICD-10-PCS | Mod: CPTII,S$GLB,, | Performed by: SURGERY

## 2022-12-12 PROCEDURE — 93925 PR DUPLEX LO EXTREM ART BILAT: ICD-10-PCS | Mod: S$GLB,,, | Performed by: SURGERY

## 2022-12-12 PROCEDURE — 99999 PR PBB SHADOW E&M-EST. PATIENT-LVL III: ICD-10-PCS | Mod: PBBFAC,,, | Performed by: SURGERY

## 2022-12-12 PROCEDURE — 93923 UPR/LXTR ART STDY 3+ LVLS: CPT | Mod: S$GLB,,, | Performed by: SURGERY

## 2022-12-12 PROCEDURE — 3075F SYST BP GE 130 - 139MM HG: CPT | Mod: CPTII,S$GLB,, | Performed by: SURGERY

## 2022-12-12 PROCEDURE — 4010F ACE/ARB THERAPY RXD/TAKEN: CPT | Mod: CPTII,S$GLB,, | Performed by: SURGERY

## 2022-12-12 PROCEDURE — 3008F BODY MASS INDEX DOCD: CPT | Mod: CPTII,S$GLB,, | Performed by: SURGERY

## 2022-12-12 PROCEDURE — 93923 PR NON-INVASIVE PHYSIOLOGIC STUDY EXTREMITY 3 LEVELS: ICD-10-PCS | Mod: S$GLB,,, | Performed by: SURGERY

## 2022-12-12 PROCEDURE — 3008F PR BODY MASS INDEX (BMI) DOCUMENTED: ICD-10-PCS | Mod: CPTII,S$GLB,, | Performed by: SURGERY

## 2022-12-12 PROCEDURE — 99212 PR OFFICE/OUTPT VISIT, EST, LEVL II, 10-19 MIN: ICD-10-PCS | Mod: S$GLB,,, | Performed by: SURGERY

## 2022-12-12 PROCEDURE — 3079F DIAST BP 80-89 MM HG: CPT | Mod: CPTII,S$GLB,, | Performed by: SURGERY

## 2022-12-12 PROCEDURE — 4010F PR ACE/ARB THEARPY RXD/TAKEN: ICD-10-PCS | Mod: CPTII,S$GLB,, | Performed by: SURGERY

## 2022-12-12 PROCEDURE — 99212 OFFICE O/P EST SF 10 MIN: CPT | Mod: S$GLB,,, | Performed by: SURGERY

## 2022-12-12 PROCEDURE — 3079F PR MOST RECENT DIASTOLIC BLOOD PRESSURE 80-89 MM HG: ICD-10-PCS | Mod: CPTII,S$GLB,, | Performed by: SURGERY

## 2022-12-12 PROCEDURE — 1159F MED LIST DOCD IN RCRD: CPT | Mod: CPTII,S$GLB,, | Performed by: SURGERY

## 2022-12-12 PROCEDURE — 93925 LOWER EXTREMITY STUDY: CPT | Mod: S$GLB,,, | Performed by: SURGERY

## 2022-12-12 PROCEDURE — 99999 PR PBB SHADOW E&M-EST. PATIENT-LVL III: CPT | Mod: PBBFAC,,, | Performed by: SURGERY

## 2022-12-12 RX ORDER — SODIUM, POTASSIUM,MAG SULFATES 17.5-3.13G
1 SOLUTION, RECONSTITUTED, ORAL ORAL DAILY
Qty: 1 KIT | Refills: 0 | Status: SHIPPED | OUTPATIENT
Start: 2022-12-12 | End: 2022-12-14

## 2022-12-12 NOTE — PROGRESS NOTES
VASCULAR SURGERY NOTE    Patient ID: Nanda Colin III is a 57 y.o. male.    I. HISTORY     Chief Complaint: Intermittent claudication     HPI: Nanda Colin III is a 57 y.o. male who is here today for follow up appointment. Patient's primary complaint is right leg pain, characterized as an aching in the posterior calf and numbness to the foot, when he walks for than 1/2 a block, requiring 3-5 minutes of rest for pain to regress. Patient states also has long standing history of back pain, bulging discs, and OA of his lumbar and sacral spine with no significant resolution of symptoms with recent L4-S1 epidural steroid injections. He is a  and this pain is inhibiting his ability to preform daily activities. Laying and rest seem to be the only alleviating factors.  Patient denies pain at rest. He indicates inability to have and maintain an erection, and also endorses pain in the buttock and lower back. He relays current tobacco use, 1 pack a day, for the last 30 years. Patient denies worsening of numbness in the evening in the evening while at rest. Patient also notes an extensive history of heart attack, stroke, and cancer in his family history. He presents in clinic today with new imaging to discuss surgical options.     Interval Hx 10/31/22: Pt comes for follow up evaluation. Patient reports that after the procedure he was able to walk 2 miles without any pain or issues. As of recent claudication symptoms have returned and pain starts after walking 1 block. He continues to take ASA, plavix, statin.     Interval Hx 12/12/22: Patient returns to clinic for f/u s/p angiogram with bilateral iliac stent placement on 11/09. Pt states that his pain is improved. He says that when he walks 2-3 miles he develops 6/10 heel pain that improves with rest, but he denies any pain in his calves or thighs while walking.     Denies MI/stroke.   Endorses smoking 30 pack year hx.     Review of patient's  allergies indicates:   Allergen Reactions    Adhesive Hives and Blisters     TEGADERM - Transparent Film       Past Medical History:   Diagnosis Date    BPH (benign prostatic hypertrophy)     Hernia     Hyperlipidemia     Hypertension, essential 2015    Incarcerated umbilical hernia 2016    Umbilical hernia      Family History   Problem Relation Age of Onset    Stroke Father     Heart disease Paternal Uncle     Kidney disease Paternal Uncle     Depression Paternal Uncle     Cancer Maternal Grandfather     COPD Maternal Grandfather     Cancer Paternal Grandmother     COPD Paternal Aunt      Social History     Occupational History    Occupation: superintendent     Comment:  [employer]     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      clopidogreL (PLAVIX) 75 mg tablet Take 1 tablet (75 mg total) by mouth once daily. 30 tablet 11    clotrimazole (LOTRIMIN) 1 % cream Apply topically 2 (two) times daily. 60 g 0    finasteride (PROSCAR) 5 mg tablet Take 1 tablet (5 mg total) by mouth once daily. 30 tablet 11    losartan (COZAAR) 25 MG tablet Take 1 tablet (25 mg total) by mouth once daily. 90 tablet 3    rosuvastatin (CRESTOR) 20 MG tablet Take 1 tablet (20 mg total) by mouth once daily. 90 tablet 1    sildenafiL (VIAGRA) 100 MG tablet Take 1 tablet (100 mg total) by mouth daily as needed for Erectile Dysfunction. 30 tablet 2    sodium,potassium,mag sulfates (SUPREP BOWEL PREP KIT) 17.5-3.13-1.6 gram SolR Take 177 mLs by mouth once daily. for 2 days 1  kit 0    tamsulosin (FLOMAX) 0.4 mg Cap Take 1 capsule (0.4 mg total) by mouth once daily. 90 capsule 3     No current facility-administered medications on file prior to visit.        Past Surgical History:   Procedure Laterality Date    ANGIOGRAPHY OF LOWER EXTREMITY Bilateral 11/9/2022    Procedure: Angiogram Extremity Unilateral;  Surgeon: Ruperto Bay MD;  Location: [address];  Service: Vascular;  Laterality: Bilateral;  3.7 min  1067.83 mGy  125.26 Gy.cm  104ml Dye    AORTOGRAPHY N/A 11/9/2022    Procedure: AORTOGRAM;  Surgeon: Ruperto Bay MD;  [facility];  Service: Vascular;  Laterality: N/A;    AORTOGRAPHY WITH SERIALOGRAPHY N/A 3/15/2022    Procedure: AORTOGRAM, WITH SERIALOGRAPHY;  Surgeon: Ruperto Bay MD;  [address];  Service: Vascular;  Laterality: N/A;  aortogram w/ runoff & PTA,    COLONOSCOPY N/A 1/14/2019    Procedure: COLONOSCOPY;  Surgeon: Fritz Franco MD;  Location: [facility] ENDO (4TH FLR);  Service: Endoscopy;  Laterality: N/A;  Do not cancel this order    HERNIA REPAIR      TRANSFORAMINAL EPIDURAL INJECTION OF STEROID Right 12/15/2021    Procedure: Injection,steroid,epidural,transforaminal approach Right L4 & L5 (May change depending MRI);  Surgeon: Stacey Mota MD;  [place] [facility] [place] [facility]  Service: Pain Management;  Laterality: Right;    VASECTOMY      WISDOM TOOTH EXTRACTION           II. PHYSICAL EXAM     Physical Exam  Vitals reviewed.   Constitutional:       General: He is not in acute distress.     Appearance: Normal appearance. He is not ill-appearing.   Neurological:      Mental Status: He is alert.     Vascular  No carotid bruits present upon auscultation  Normal S1/S2  2+ radial pulses bilaterally  RLE: 2+ DP & PT  LLE: 2+ DP & PT       III. ASSESSMENT & PLAN (MEDICAL DECISION MAKING)       Imaging Results: (I have personally reviewed all images and provided interpretation below)    ABIs 02/14/22:   Right - 1.19; w/ normal waveforms    Left - 0.68; w/ blunted waveforms     CTA 3/3/22:  Impression:     1. 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  Otherwise no significant atherosclerotic disease in the lower extremities as detailed above.  2. Enlarged prostate.    REGLA 10/31/22:  Rt 0.98  Lt 1.08    Lower extremity arterial duplex 10/31.22:  75% stenoses in the proxima segments of the both  Rt SEAN stent and LT SEAN stent.     12/12/22:  REGLA: Right 1.13  Left 1.19      Right   Left     PS cm/s PS cm/s  Pre-stent 103  103  Stent prox 188  176   Stent mid 201  218  Stent distal 212  204  Post-Stent 117  158    Assessment/Diagnosis and Plan:      57 y.o. male with symptoms consistent with RLE claudication complicated by radiculopathy. S/p bilateral SEAN kissing stents. Patient complains of pain in his heels after walking 2-3 miles and resolution of numbness and tingling present prior to stent placement.     Vascular studies indicate patent stents and improved circulation in the lower extremities. PE displayed warm extremities with palpable pulses.     Recommend f/u in clinic in 3 months with repeat REGLA & arterial duplex U/S of lower extremities bilaterally.   Continue ASA, plavix, statin.

## 2022-12-12 NOTE — TELEPHONE ENCOUNTER
Contacted pt to schedule appts in vascular lab prior to visit with Dr. Bay. Appointments scheduled, pt verified.

## 2022-12-13 ENCOUNTER — PATIENT MESSAGE (OUTPATIENT)
Dept: INTERNAL MEDICINE | Facility: CLINIC | Age: 57
End: 2022-12-13
Payer: COMMERCIAL

## 2023-01-17 ENCOUNTER — LAB VISIT (OUTPATIENT)
Dept: LAB | Facility: HOSPITAL | Age: 58
End: 2023-01-17
Attending: FAMILY MEDICINE
Payer: COMMERCIAL

## 2023-01-17 DIAGNOSIS — E78.5 HYPERLIPIDEMIA, UNSPECIFIED HYPERLIPIDEMIA TYPE: ICD-10-CM

## 2023-01-17 LAB
ALT SERPL W/O P-5'-P-CCNC: 35 U/L (ref 10–44)
AST SERPL-CCNC: 25 U/L (ref 10–40)
CHOLEST SERPL-MCNC: 193 MG/DL (ref 120–199)
CHOLEST/HDLC SERPL: 5.4 {RATIO} (ref 2–5)
HDLC SERPL-MCNC: 36 MG/DL (ref 40–75)
HDLC SERPL: 18.7 % (ref 20–50)
LDLC SERPL CALC-MCNC: 99.6 MG/DL (ref 63–159)
NONHDLC SERPL-MCNC: 157 MG/DL
TRIGL SERPL-MCNC: 287 MG/DL (ref 30–150)

## 2023-01-17 PROCEDURE — 80061 LIPID PANEL: CPT | Performed by: FAMILY MEDICINE

## 2023-01-17 PROCEDURE — 84450 TRANSFERASE (AST) (SGOT): CPT | Performed by: FAMILY MEDICINE

## 2023-01-17 PROCEDURE — 84460 ALANINE AMINO (ALT) (SGPT): CPT | Performed by: FAMILY MEDICINE

## 2023-01-17 PROCEDURE — 36415 COLL VENOUS BLD VENIPUNCTURE: CPT | Performed by: FAMILY MEDICINE

## 2023-01-19 ENCOUNTER — OFFICE VISIT (OUTPATIENT)
Dept: PULMONOLOGY | Facility: CLINIC | Age: 58
End: 2023-01-19
Attending: FAMILY MEDICINE
Payer: COMMERCIAL

## 2023-01-19 VITALS
DIASTOLIC BLOOD PRESSURE: 72 MMHG | SYSTOLIC BLOOD PRESSURE: 138 MMHG | OXYGEN SATURATION: 95 % | WEIGHT: 203.5 LBS | HEART RATE: 81 BPM | BODY MASS INDEX: 33.91 KG/M2 | HEIGHT: 65 IN

## 2023-01-19 DIAGNOSIS — R59.0 MEDIASTINAL ADENOPATHY: ICD-10-CM

## 2023-01-19 DIAGNOSIS — R59.0 LOCALIZED ENLARGED LYMPH NODES: Primary | ICD-10-CM

## 2023-01-19 DIAGNOSIS — Z87.891 PERSONAL HISTORY OF NICOTINE DEPENDENCE: ICD-10-CM

## 2023-01-19 DIAGNOSIS — E66.9 OBESITY (BMI 30.0-34.9): ICD-10-CM

## 2023-01-19 PROBLEM — E66.811 OBESITY (BMI 30.0-34.9): Status: ACTIVE | Noted: 2023-01-19

## 2023-01-19 PROCEDURE — 3078F DIAST BP <80 MM HG: CPT | Mod: CPTII,S$GLB,, | Performed by: INTERNAL MEDICINE

## 2023-01-19 PROCEDURE — 3078F PR MOST RECENT DIASTOLIC BLOOD PRESSURE < 80 MM HG: ICD-10-PCS | Mod: CPTII,S$GLB,, | Performed by: INTERNAL MEDICINE

## 2023-01-19 PROCEDURE — 3075F PR MOST RECENT SYSTOLIC BLOOD PRESS GE 130-139MM HG: ICD-10-PCS | Mod: CPTII,S$GLB,, | Performed by: INTERNAL MEDICINE

## 2023-01-19 PROCEDURE — 3008F BODY MASS INDEX DOCD: CPT | Mod: CPTII,S$GLB,, | Performed by: INTERNAL MEDICINE

## 2023-01-19 PROCEDURE — 3075F SYST BP GE 130 - 139MM HG: CPT | Mod: CPTII,S$GLB,, | Performed by: INTERNAL MEDICINE

## 2023-01-19 PROCEDURE — 99203 OFFICE O/P NEW LOW 30 MIN: CPT | Mod: S$GLB,,, | Performed by: INTERNAL MEDICINE

## 2023-01-19 PROCEDURE — 1159F MED LIST DOCD IN RCRD: CPT | Mod: CPTII,S$GLB,, | Performed by: INTERNAL MEDICINE

## 2023-01-19 PROCEDURE — 99999 PR PBB SHADOW E&M-EST. PATIENT-LVL IV: ICD-10-PCS | Mod: PBBFAC,,, | Performed by: INTERNAL MEDICINE

## 2023-01-19 PROCEDURE — 99203 PR OFFICE/OUTPT VISIT, NEW, LEVL III, 30-44 MIN: ICD-10-PCS | Mod: S$GLB,,, | Performed by: INTERNAL MEDICINE

## 2023-01-19 PROCEDURE — 1160F RVW MEDS BY RX/DR IN RCRD: CPT | Mod: CPTII,S$GLB,, | Performed by: INTERNAL MEDICINE

## 2023-01-19 PROCEDURE — 3008F PR BODY MASS INDEX (BMI) DOCUMENTED: ICD-10-PCS | Mod: CPTII,S$GLB,, | Performed by: INTERNAL MEDICINE

## 2023-01-19 PROCEDURE — 1160F PR REVIEW ALL MEDS BY PRESCRIBER/CLIN PHARMACIST DOCUMENTED: ICD-10-PCS | Mod: CPTII,S$GLB,, | Performed by: INTERNAL MEDICINE

## 2023-01-19 PROCEDURE — 1159F PR MEDICATION LIST DOCUMENTED IN MEDICAL RECORD: ICD-10-PCS | Mod: CPTII,S$GLB,, | Performed by: INTERNAL MEDICINE

## 2023-01-19 PROCEDURE — 99999 PR PBB SHADOW E&M-EST. PATIENT-LVL IV: CPT | Mod: PBBFAC,,, | Performed by: INTERNAL MEDICINE

## 2023-01-19 NOTE — PROGRESS NOTES
Subjective:       Patient ID: Nanda Colin III is a 57 y.o. male.    Chief Complaint: Abnormal Ct Scan    HPI:   Nanda Colin III is a 57 y.o. male who presents for evaluation of an abnormal CT Chest.     Feels low energy.  No unintended weight loss.  Denies hemoptysis  Denies night sweat.  Has reflux at night that he feels that it has entered his wind pipe.   Recently started a OTC PPI    Carries a diagnosis of GINNA but does not use CPAP.   Sleeps on the cough due to snoring.     Current smoker.  30+ years x 1 ppd.  Using nicotine gum  He has used the smoking cessation program.     Aunts/Uncles with lung cancer.     .     Review of Systems   Constitutional:  Negative for fever, chills and activity change.   HENT:  Negative for postnasal drip, rhinorrhea, sinus pressure and congestion.    Respiratory:  Positive for snoring. Negative for cough, hemoptysis, shortness of breath and dyspnea on extertion.    Cardiovascular:  Negative for chest pain and leg swelling.   Genitourinary:  Negative for difficulty urinating.   Endocrine:  Negative for cold intolerance and heat intolerance.    Musculoskeletal:  Negative for joint swelling and myalgias.   Gastrointestinal:  Positive for acid reflux. Negative for nausea, vomiting and abdominal pain.   Neurological:  Negative for headaches.   Hematological:  Negative for adenopathy. No excessive bruising.   Psychiatric/Behavioral:  Negative for confusion and sleep disturbance.        Social History     Tobacco Use    Smoking status: Every Day     Packs/day: 1.00     Types: Cigarettes     Last attempt to quit: 10/15/2017     Years since quittin.2    Smokeless tobacco: Never   Substance Use Topics    Alcohol use: Yes     Alcohol/week: 0.0 standard drinks     Comment: socially       Review of patient's allergies indicates:   Allergen Reactions    Adhesive Hives and Blisters     TEGADERM - Transparent Film     Past Medical History:   Diagnosis Date     BPH (benign prostatic hypertrophy)     Family history of prostate cancer 8/5/2021    brother    Hernia     Hyperlipidemia     Hypertension, essential 12/30/2015    Incarcerated umbilical hernia 6/25/2016    Umbilical hernia      Past Surgical History:   Procedure Laterality Date    ANGIOGRAPHY OF LOWER EXTREMITY Bilateral 11/9/2022    Procedure: Angiogram Extremity Unilateral;  Surgeon: Ruperto Bay MD;  [facility];  Service: Vascular;  Laterality: Bilateral;  3.7 min  1067.83 mGy  125.26 Gy.cm  104ml Dye    AORTOGRAPHY N/A 11/9/2022    Procedure: AORTOGRAM;  Surgeon: Ruperto Bay MD;  [facility];  Service: Vascular;  Laterality: N/A;    AORTOGRAPHY WITH SERIALOGRAPHY N/A 3/15/2022    Procedure: AORTOGRAM, WITH SERIALOGRAPHY;  Surgeon: Ruperto Bay MD;  [facility];  Service: Vascular;  Laterality: N/A;  aortogram w/ runoff & PTA,    COLONOSCOPY N/A 1/14/2019    Procedure: COLONOSCOPY;  Surgeon: Fritz Franco MD;  Location: [facility] ENDO (4TH FLR);  Service: Endoscopy;  Laterality: N/A;  Do not cancel this order    HERNIA REPAIR      TRANSFORAMINAL EPIDURAL INJECTION OF STEROID Right 12/15/2021    Procedure: Injection,steroid,epidural,transforaminal approach Right L4 & L5 (May change depending MRI);  Surgeon: Stacey Mota MD;  Location: [facility];  Service: Pain Management;  Laterality: Right;    VASECTOMY      WISDOM TOOTH EXTRACTION       Current Outpatient Medications on File Prior to Visit   Medication Sig    aspirin (ECOTRIN) 81 MG EC tablet Take 81 mg by mouth.    clopidogreL (PLAVIX) 75 mg tablet Take 1 tablet (75 mg total) by mouth once daily.    clotrimazole (LOTRIMIN) 1 % cream Apply topically 2 (two) times daily.    finasteride (PROSCAR) 5 mg tablet Take 1 tablet (5 mg total) by mouth once daily.    losartan (COZAAR) 25 MG tablet Take 1 tablet (25 mg total) by mouth once daily.    rosuvastatin (CRESTOR) 20 MG tablet Take 1 tablet (20 mg total)  "by mouth once daily.    sildenafiL (VIAGRA) 100 MG tablet Take 1 tablet (100 mg total) by mouth daily as needed for Erectile Dysfunction.    tamsulosin (FLOMAX) 0.4 mg Cap Take 1 capsule (0.4 mg total) by mouth once daily.     No current facility-administered medications on file prior to visit.       Objective:      Vitals:    01/19/23 0922   BP: 138/72   BP Location: Right arm   Patient Position: Sitting   BP Method: Medium (Manual)   Pulse: 81   SpO2: 95%   Weight: 92.3 kg (203 lb 7.8 oz)   Height: 5' 5" (1.651 m)     Physical Exam   Constitutional: He is oriented to person, place, and time. He appears well-developed and well-nourished. No distress. He is obese.   HENT:   Head: Normocephalic.   Cardiovascular: Normal rate, regular rhythm and normal heart sounds.   No murmur heard.  Pulmonary/Chest: Effort normal and breath sounds normal. He has no wheezes. He has no rhonchi. He has no rales.   Abdominal: Soft. Bowel sounds are normal. He exhibits no distension. There is no abdominal tenderness.   Musculoskeletal:         General: No edema. Normal range of motion.      Cervical back: Normal range of motion.   Neurological: He is alert and oriented to person, place, and time.   Skin: Skin is warm and dry. He is not diaphoretic. No cyanosis. Nails show no clubbing.   Psychiatric: He has a normal mood and affect.   Personal Diagnostic Review    No flowsheet data found.      VAS US Abdominal Aorta  Indication  ========    PVD    Stent  =====    Stent: Bilateral kissing stents  Right pre-stent PSV    103 cm/s  Right stent prox PSV   188 cm/s  Right stent mid PSV    201 cm/s  Right stent distal  cm/s  Right post-stent PSV   117 cm/s  Left pre-stent  cm/s  Left stent prox PSV    176 cm/s  Left stent mid  cm/s  Left stent distal PSV  204 cm/s  Left post-stent PSV    158 cm/s    Impression  =========    Duplex imaging of bilateral kissing stents reveals patent arterial flow throughout without evidence " of a hemodynamically significant  stenosis.    DATE OF SERVICE: 12/12/2022                                                      Sonographer: Renata Monaco  Electronically Signed by: STAR Tan M.D. at 12/13/2022-09:49  VAS US Ankle Brachial Indices Resting  Indication  ========    Peripheral Vascular Disease    Pressure Lower  ============    Rt PTA BP  143 mmHg  Rt dors pedis A  mmHg  Right REGLA ratio use:   post. tibial  Rt REGLA post tibial (rt post tib A BP / max brach A BP) 1.13  Rt REGLA (rt dors ped A BP / max brach A BP) 1.11  Rt ankle BP / max brach A BP   1.13  Lt brachial A syst BP  126 mmHg  Lt PTA BP  150 mmHg  Lt dors pedis A  mmHg  Left REGLA ratio use:    post. tibial  Lt REGLA (lt post tibial A BP / max brach A BP)  1.19  Lt REGLA dors ped (lt dors ped A BP / max brach A BP)    1.17  Lt ankle BP / max brach A BP   1.19    Impression  =========    Right Leg: Segmental pressures and PVR waveforms do not suggest any evidence of significant peripheral arterial occlusive disease.    Left Leg: Segmental pressures and PVR waveforms do not suggest any evidence of significant peripheral arterial occlusive disease.    DATE OF SERVICE: 12/12/2022                                                      Sonographer: Renata Monaco  Electronically Signed by: STAR Tan M.D. at 12/13/2022-09:16    CT Chest: 10/24/22: Images reviewed. Enlarged right paratracheal and subcarinal LN    Assessment:     Orders Placed This Encounter   Procedures    CT Chest Without Contrast     Standing Status:   Future     Standing Expiration Date:   1/19/2024     Order Specific Question:   May the Radiologist modify the order per protocol to meet the clinical needs of the patient?     Answer:   Yes     1. Localized enlarged lymph nodes    2. Mediastinal adenopathy    3. Personal history of nicotine dependence    4. Obesity (BMI 30.0-34.9)        Plan:          Problem List Items Addressed This Visit          Endocrine    Obesity (BMI  30.0-34.9)    Current Assessment & Plan     Encouraged weight loss and exercise            Other    Personal history of nicotine dependence    Overview     Current smoker  30+ pack years         Current Assessment & Plan     Encouraged smoking cessation.         Mediastinal adenopathy    Overview     borderline on LDCT - stable         Current Assessment & Plan     Unclear etiology.  No alarm symptoms/signs.  Plan for repeat scan in 6 months.          Other Visit Diagnoses       Localized enlarged lymph nodes    -  Primary    Relevant Orders    CT Chest Without Contrast           30 minutes of total time spent on the encounter, which includes face to face time and non-face to face time preparing to see the patient (eg, review of tests), Obtaining and/or reviewing separately obtained history, Documenting clinical information in the electronic or other health record, Independently interpreting results (not separately reported) and communicating results to the patient/family/caregiver, or Care coordination (not separately reported).

## 2023-01-31 ENCOUNTER — TELEPHONE (OUTPATIENT)
Dept: INTERNAL MEDICINE | Facility: CLINIC | Age: 58
End: 2023-01-31
Payer: COMMERCIAL

## 2023-01-31 DIAGNOSIS — I73.9 PAD (PERIPHERAL ARTERY DISEASE): ICD-10-CM

## 2023-01-31 DIAGNOSIS — I25.10 CORONARY ARTERY CALCIFICATION SEEN ON CT SCAN: ICD-10-CM

## 2023-01-31 DIAGNOSIS — E78.5 HYPERLIPIDEMIA, UNSPECIFIED HYPERLIPIDEMIA TYPE: Primary | ICD-10-CM

## 2023-01-31 RX ORDER — ROSUVASTATIN CALCIUM 40 MG/1
40 TABLET, COATED ORAL DAILY
Qty: 90 TABLET | Refills: 1 | Status: SHIPPED | OUTPATIENT
Start: 2023-01-31 | End: 2023-08-02

## 2023-02-01 NOTE — TELEPHONE ENCOUNTER
Please call patient and explain that I would like to repeat some labs since there was a borderline result. The tests show cholesterol level is better but still above goal, I want to increase his Crestor to 40 mg daily.  Advised patient to watch for any side effects such as abdominal pain, muscle aches and let us know right away if anything occurs    Please see orders for Lipids, ALT, and AST and please call patient to schedule in 3 months.     Thank you.

## 2023-02-06 NOTE — TELEPHONE ENCOUNTER
Called and discussed test results and recommendations with the pt. The pt expressed understanding.     Pt will start to take the 40 mg. I informed him to be on the look out for any muscle cramping and/or abdominal pain. The pt expressed understanding.     3 month labs, please place orders for Lipids, ALT, and AST

## 2023-02-13 ENCOUNTER — ANESTHESIA (OUTPATIENT)
Dept: ENDOSCOPY | Facility: HOSPITAL | Age: 58
End: 2023-02-13
Payer: COMMERCIAL

## 2023-02-13 ENCOUNTER — ANESTHESIA EVENT (OUTPATIENT)
Dept: ENDOSCOPY | Facility: HOSPITAL | Age: 58
End: 2023-02-13
Payer: COMMERCIAL

## 2023-02-13 ENCOUNTER — HOSPITAL ENCOUNTER (OUTPATIENT)
Facility: HOSPITAL | Age: 58
Discharge: HOME | End: 2023-02-13
Attending: INTERNAL MEDICINE | Admitting: INTERNAL MEDICINE
Payer: COMMERCIAL

## 2023-02-13 VITALS
WEIGHT: 190 LBS | TEMPERATURE: 98 F | HEIGHT: 65 IN | HEART RATE: 67 BPM | SYSTOLIC BLOOD PRESSURE: 131 MMHG | BODY MASS INDEX: 31.65 KG/M2 | OXYGEN SATURATION: 99 % | DIASTOLIC BLOOD PRESSURE: 77 MMHG | RESPIRATION RATE: 26 BRPM

## 2023-02-13 DIAGNOSIS — Z12.11 SPECIAL SCREENING FOR MALIGNANT NEOPLASMS, COLON: Primary | ICD-10-CM

## 2023-02-13 PROCEDURE — 88305 TISSUE EXAM BY PATHOLOGIST: CPT | Mod: 26,,, | Performed by: PATHOLOGY

## 2023-02-13 PROCEDURE — 37000008 HC ANESTHESIA 1ST 15 MINUTES: Performed by: INTERNAL MEDICINE

## 2023-02-13 PROCEDURE — 88305 TISSUE EXAM BY PATHOLOGIST: ICD-10-PCS | Mod: 26,,, | Performed by: PATHOLOGY

## 2023-02-13 PROCEDURE — E9220 PRA ENDO ANESTHESIA: HCPCS | Mod: 33,,, | Performed by: NURSE ANESTHETIST, CERTIFIED REGISTERED

## 2023-02-13 PROCEDURE — 37000009 HC ANESTHESIA EA ADD 15 MINS: Performed by: INTERNAL MEDICINE

## 2023-02-13 PROCEDURE — 45380 COLONOSCOPY AND BIOPSY: CPT | Mod: PT | Performed by: INTERNAL MEDICINE

## 2023-02-13 PROCEDURE — 45380 PR COLONOSCOPY,BIOPSY: ICD-10-PCS | Mod: 33,,, | Performed by: INTERNAL MEDICINE

## 2023-02-13 PROCEDURE — 25000003 PHARM REV CODE 250: Performed by: NURSE ANESTHETIST, CERTIFIED REGISTERED

## 2023-02-13 PROCEDURE — E9220 PRA ENDO ANESTHESIA: ICD-10-PCS | Mod: 33,,, | Performed by: NURSE ANESTHETIST, CERTIFIED REGISTERED

## 2023-02-13 PROCEDURE — 63600175 PHARM REV CODE 636 W HCPCS: Performed by: NURSE ANESTHETIST, CERTIFIED REGISTERED

## 2023-02-13 PROCEDURE — 27201012 HC FORCEPS, HOT/COLD, DISP: Performed by: INTERNAL MEDICINE

## 2023-02-13 PROCEDURE — 45380 COLONOSCOPY AND BIOPSY: CPT | Mod: 33,,, | Performed by: INTERNAL MEDICINE

## 2023-02-13 PROCEDURE — 88305 TISSUE EXAM BY PATHOLOGIST: CPT | Performed by: PATHOLOGY

## 2023-02-13 RX ORDER — SODIUM CHLORIDE 9 MG/ML
INJECTION, SOLUTION INTRAVENOUS CONTINUOUS
Status: DISCONTINUED | OUTPATIENT
Start: 2023-02-13 | End: 2023-02-13 | Stop reason: HOSPADM

## 2023-02-13 RX ORDER — PROPOFOL 10 MG/ML
VIAL (ML) INTRAVENOUS CONTINUOUS PRN
Status: DISCONTINUED | OUTPATIENT
Start: 2023-02-13 | End: 2023-02-13

## 2023-02-13 RX ORDER — PROPOFOL 10 MG/ML
VIAL (ML) INTRAVENOUS
Status: DISCONTINUED | OUTPATIENT
Start: 2023-02-13 | End: 2023-02-13

## 2023-02-13 RX ORDER — LIDOCAINE HYDROCHLORIDE 20 MG/ML
INJECTION, SOLUTION EPIDURAL; INFILTRATION; INTRACAUDAL; PERINEURAL
Status: DISCONTINUED | OUTPATIENT
Start: 2023-02-13 | End: 2023-02-13

## 2023-02-13 RX ADMIN — LIDOCAINE HYDROCHLORIDE 50 MG: 20 INJECTION, SOLUTION EPIDURAL; INFILTRATION; INTRACAUDAL; PERINEURAL at 07:02

## 2023-02-13 RX ADMIN — Medication 150 MCG/KG/MIN: at 07:02

## 2023-02-13 RX ADMIN — SODIUM CHLORIDE: 0.9 INJECTION, SOLUTION INTRAVENOUS at 06:02

## 2023-02-13 RX ADMIN — PROPOFOL 100 MG: 10 INJECTION, EMULSION INTRAVENOUS at 07:02

## 2023-02-13 NOTE — H&P
[facility] 4th Floor  Gastroenterology  H&P    Patient Name: Nanda Colin III  MRN: [id number]  Admission Date: 2/13/2023  Code Status: Prior    Attending Provider: jeanette novoa  Primary Care Physician: Horace Orr MD  Principal Problem:<principal problem not specified>    Subjective:     History of Present Illness: history of colon polyps    Past Medical History:   Diagnosis Date    BPH (benign prostatic hypertrophy)     Family history of prostate cancer 8/5/2021    brother    Hernia     Hyperlipidemia     Hypertension, essential 12/30/2015    Incarcerated umbilical hernia 6/25/2016    Umbilical hernia        Past Surgical History:   Procedure Laterality Date    ANGIOGRAPHY OF LOWER EXTREMITY Bilateral 11/9/2022    Procedure: Angiogram Extremity Unilateral;  Surgeon: Ruperto Bay MD;  Location: [facility] OR [address] [place] [address];  Service: Vascular;  Laterality: Bilateral;  3.7 min  1067.83 mGy  125.26 Gy.cm  104ml Dye    AORTOGRAPHY N/A 11/9/2022    Procedure: AORTOGRAM;  Surgeon: Ruperto Bay MD;  [facility];  Service: Vascular;  Laterality: N/A;    AORTOGRAPHY WITH SERIALOGRAPHY N/A 3/15/2022    Procedure: AORTOGRAM, WITH SERIALOGRAPHY;  Surgeon: Ruperto Bay MD;  [facility];  Service: Vascular;  Laterality: N/A;  aortogram w/ runoff & PTA,    COLONOSCOPY N/A 1/14/2019    Procedure: COLONOSCOPY;  Surgeon: Fritz Franco MD;  [facility]  Service: Endoscopy;  Laterality: N/A;  Do not cancel this order    HERNIA REPAIR      TRANSFORAMINAL EPIDURAL INJECTION OF STEROID Right 12/15/2021    Procedure: Injection,steroid,epidural,transforaminal approach Right L4 & L5 (May change depending MRI);  Surgeon: Stacey Mota MD;  Location: [facility] PAIN [facility]GT;  Service: Pain Management;  Laterality: Right;    VASECTOMY      WISDOM TOOTH EXTRACTION         Review of patient's allergies indicates:   Allergen Reactions    Adhesive Hives and Blisters     TEGADERM -  Transparent Film     Family History       Problem Relation (Age of Onset)    COPD Maternal Grandfather, Paternal Aunt    Cancer Maternal Grandfather, Paternal Grandmother    Depression Paternal Uncle    Heart disease Paternal Uncle    Kidney disease Paternal Uncle    Stroke Father          Tobacco Use    Smoking status: Every Day     Packs/day: 1.00     Types: Cigarettes     Last attempt to quit: 10/15/2017     Years since quittin.3    Smokeless tobacco: Never   Substance and Sexual Activity    Alcohol use: Yes     Alcohol/week: 0.0 standard drinks     Comment: socially    Drug use: No    Sexual activity: Yes     Partners: Female     Review of Systems   Respiratory: Negative.     Cardiovascular: Negative.    Objective:     Vital Signs (Most Recent):  Temp: 97.8 °F (36.6 °C) (23)  Pulse: 73 (23)  Resp: 16 (23)  BP: (!) 142/84 (23)  SpO2: 95 % (23)   Vital Signs (24h Range):  Temp:  [97.8 °F (36.6 °C)] 97.8 °F (36.6 °C)  Pulse:  [73] 73  Resp:  [16] 16  SpO2:  [95 %] 95 %  BP: (142)/(84) 142/84     Weight: 86.2 kg (190 lb) (23)  Body mass index is 31.62 kg/m².    No intake or output data in the 24 hours ending 23 0656    Lines/Drains/Airways       Peripheral Intravenous Line  Duration                  Peripheral IV - Single Lumen 23 0646 20 G Distal;Posterior;Right Forearm <1 day                    Physical Exam  Cardiovascular:      Rate and Rhythm: Normal rate and regular rhythm.   Pulmonary:      Effort: Pulmonary effort is normal.      Breath sounds: Normal breath sounds.       Significant Labs:      Significant Imaging:      Assessment/Plan:     There are no hospital problems to display for this patient.      Indication for procedure:    ASA:II  Airway normal  Malampati class:    Personal and family history negative for anesthesia problems    Plan:colonoscopy  Anesthesia plan: general      Ja Alexander MD  Gastroenterology  [facility] 4th [facility]

## 2023-02-13 NOTE — PROVATION PATIENT INSTRUCTIONS
Discharge Summary/Instructions after an Endoscopic Procedure  Patient Name: Nanda Colin  Patient MRN: [id number]  Patient YOB: 1965  Monday, February 13, 2023  Ja Alexander MD  Dear patient,  As a result of recent federal legislation (The Federal Cures Act), you may   receive lab or pathology results from your procedure in your [facility]   account before your physician is able to contact you. Your physician or   their representative will relay the results to you with their   recommendations at their soonest availability.  Thank you,  RESTRICTIONS:  During your procedure today, you received medications for sedation.  These   medications may affect your judgment, balance and coordination.  Therefore,   for 24 hours, you have the following restrictions:   - DO NOT drive a car, operate machinery, make legal/financial decisions,   sign important papers or drink alcohol.    ACTIVITY:  Today: no heavy lifting, straining or running due to procedural   sedation/anesthesia.  The following day: return to full activity including work.  DIET:  Eat and drink normally unless instructed otherwise.     TREATMENT FOR COMMON SIDE EFFECTS:  - Mild abdominal pain, nausea, belching, bloating or excessive gas:  rest,   eat lightly and use a heating pad.  - Sore Throat: treat with throat lozenges and/or gargle with warm salt   water.  - Because air was used during the procedure, expelling large amounts of air   from your rectum or belching is normal.  - If a bowel prep was taken, you may not have a bowel movement for 1-3 days.    This is normal.  SYMPTOMS TO WATCH FOR AND REPORT TO YOUR PHYSICIAN:  1. Abdominal pain or bloating, other than gas cramps.  2. Chest pain.  3. Back pain.  4. Signs of infection such as: chills or fever occurring within 24 hours   after the procedure.  5. Rectal bleeding, which would show as bright red, maroon, or black stools.   (A tablespoon of blood from the rectum is not serious, especially if    hemorrhoids are present.)  6. Vomiting.  7. Weakness or dizziness.  GO DIRECTLY TO THE NEAREST EMERGENCY ROOM IF YOU HAVE ANY OF THE FOLLOWING:      Difficulty breathing              Chills and/or fever over 101 F   Persistent vomiting and/or vomiting blood   Severe abdominal pain   Severe chest pain   Black, tarry stools   Bleeding- more than one tablespoon   Any other symptom or condition that you feel may need urgent attention  Your doctor recommends these additional instructions:  If any biopsies were taken, your doctors clinic will contact you in 1 to 2   weeks with any results.  - Patient has a contact number available for emergencies.  The signs and   symptoms of potential delayed complications were discussed with the   patient.  Return to normal activities tomorrow.  Written discharge   instructions were provided to the patient.   - Discharge patient to home (ambulatory).   - Resume previous diet.   - Continue present medications.   - Await pathology results.   - Repeat colonoscopy in 3 - 5 years for surveillance.   - Resume Plavix (clopidogrel) at prior dose today.  For questions, problems or results please call your physician - Ja Alexander MD at Work:  [phone].  [facility], EMERGENCY ROOM PHONE NUMBER: [phone]  IF A COMPLICATION OR EMERGENCY SITUATION ARISES AND YOU ARE UNABLE TO REACH   YOUR PHYSICIAN - GO DIRECTLY TO THE EMERGENCY ROOM.  Ja Alexander MD  2/13/2023 7:22:46 AM  This report has been verified and signed electronically.  Dear patient,  As a result of recent federal legislation (The Federal Cures Act), you may   receive lab or pathology results from your procedure in your [facility]   account before your physician is able to contact you. Your physician or   their representative will relay the results to you with their   recommendations at their soonest availability.  Thank you,  PROVATION

## 2023-02-13 NOTE — TRANSFER OF CARE
"Anesthesia Transfer of Care Note    Patient: Nanda Colin III    Procedure(s) Performed: Procedure(s) (LRB):  COLONOSCOPY (N/A)    Patient location: PACU    Anesthesia Type: general    Transport from OR: Transported from OR on room air with adequate spontaneous ventilation    Post pain: adequate analgesia    Post assessment: no apparent anesthetic complications    Post vital signs: stable    Level of consciousness: awake    Nausea/Vomiting: no nausea/vomiting    Complications: none    Transfer of care protocol was followed      Last vitals:   Visit Vitals  /63 (BP Location: Left arm, Patient Position: Lying)   Pulse 73   Temp 36.6 °C (97.8 °F) (Tympanic)   Resp 16   Ht 5' 5" (1.651 m)   Wt 86.2 kg (190 lb)   SpO2 95%   BMI 31.62 kg/m²     "

## 2023-02-13 NOTE — ANESTHESIA PREPROCEDURE EVALUATION
02/13/2023  Nanda Colin III is a 57 y.o., male.  Past Medical History:   Diagnosis Date    BPH (benign prostatic hypertrophy)     Family history of prostate cancer 8/5/2021    brother    Hernia     Hyperlipidemia     Hypertension, essential 12/30/2015    Incarcerated umbilical hernia 6/25/2016    Umbilical hernia      Past Surgical History:   Procedure Laterality Date    ANGIOGRAPHY OF LOWER EXTREMITY Bilateral 11/9/2022    Procedure: Angiogram Extremity Unilateral;  Surgeon: Ruperto Bay MD;  [facility];  Service: Vascular;  Laterality: Bilateral;  3.7 min  1067.83 mGy  125.26 Gy.cm  104ml Dye    AORTOGRAPHY N/A 11/9/2022    Procedure: AORTOGRAM;  Surgeon: Ruperto Bay MD;  [facility];  Service: Vascular;  Laterality: N/A;    AORTOGRAPHY WITH SERIALOGRAPHY N/A 3/15/2022    Procedure: AORTOGRAM, WITH SERIALOGRAPHY;  Surgeon: Ruperto Bay MD;  [facility];  Service: Vascular;  Laterality: N/A;  aortogram w/ runoff & PTA,    COLONOSCOPY N/A 1/14/2019    Procedure: COLONOSCOPY;  Surgeon: Fritz Franoc MD;  Location: [facility] ENDO (4TH FLR);  Service: Endoscopy;  Laterality: N/A;  Do not cancel this order    HERNIA REPAIR      TRANSFORAMINAL EPIDURAL INJECTION OF STEROID Right 12/15/2021    Procedure: Injection,steroid,epidural,transforaminal approach Right L4 & L5 (May change depending MRI);  Surgeon: Stacey Mota MD;  Location: [facility] PAIN MGT;  Service: Pain Management;  Laterality: Right;    VASECTOMY      WISDOM TOOTH EXTRACTION       Patient Active Problem List   Diagnosis    Erectile dysfunction due to arterial insufficiency    Hypertension, essential    Hyperlipidemia    Colon polyps    BPH with urinary obstruction    Gastroesophageal reflux disease    GINNA (obstructive sleep apnea)    Laceration of left hand without foreign  body    Lumbar radiculopathy    PAD (peripheral artery disease)    Right leg pain    Personal history of nicotine dependence    Coronary artery calcification seen on CT scan    Lung granuloma    Mediastinal adenopathy    Obesity (BMI 30.0-34.9)         Pre-op Assessment    I have reviewed the Patient Summary Reports.     I have reviewed the Nursing Notes. I have reviewed the NPO Status.   I have reviewed the Medications.     Review of Systems  Anesthesia Hx:  No problems with previous Anesthesia    Cardiovascular:   Hypertension CAD      Pulmonary:   Sleep Apnea    Hepatic/GI:   GERD    Neurological:   Neuromuscular Disease,        Physical Exam  General: Alert and Oriented    Airway:  Mallampati: I   Mouth Opening: Normal  TM Distance: Normal  Tongue: Normal  Neck ROM: Normal ROM    Dental:  Intact        Anesthesia Plan  Type of Anesthesia, risks & benefits discussed:    Anesthesia Type: Gen Natural Airway  Intra-op Monitoring Plan: Standard ASA Monitors  Induction:  IV  Airway Plan: Direct  Informed Consent: Informed consent signed with the Patient and all parties understand the risks and agree with anesthesia plan.  All questions answered.   ASA Score: 2    Ready For Surgery From Anesthesia Perspective.     .

## 2023-02-15 NOTE — ANESTHESIA POSTPROCEDURE EVALUATION
Anesthesia Post Evaluation    Patient: Nanda Colin III    Procedure(s) Performed: Procedure(s) (LRB):  COLONOSCOPY (N/A)    Final Anesthesia Type: general      Patient location during evaluation: GI PACU  Patient participation: Yes- Able to Participate  Level of consciousness: awake and alert  Post-procedure vital signs: reviewed and stable  Pain management: adequate  Airway patency: patent    PONV status at discharge: No PONV  Anesthetic complications: no      Cardiovascular status: stable  Respiratory status: unassisted and spontaneous ventilation  Hydration status: euvolemic  Follow-up not needed.          Vitals Value Taken Time   /77 02/13/23 0755   Temp 36.5 °C (97.7 °F) 02/13/23 0725   Pulse 67 02/13/23 0755   Resp 26 02/13/23 0755   SpO2 99 % 02/13/23 0755         Event Time   Out of Recovery 07:56:21         Pain/Fani Score: No data recorded

## 2023-02-22 ENCOUNTER — PATIENT MESSAGE (OUTPATIENT)
Dept: GASTROENTEROLOGY | Facility: HOSPITAL | Age: 58
End: 2023-02-22
Payer: COMMERCIAL

## 2023-02-22 LAB
FINAL PATHOLOGIC DIAGNOSIS: NORMAL
Lab: NORMAL

## 2023-02-27 ENCOUNTER — PATIENT MESSAGE (OUTPATIENT)
Dept: VASCULAR SURGERY | Facility: CLINIC | Age: 58
End: 2023-02-27
Payer: COMMERCIAL

## 2023-02-27 DIAGNOSIS — I73.9 PAD (PERIPHERAL ARTERY DISEASE): Primary | ICD-10-CM

## 2023-02-28 RX ORDER — CLOPIDOGREL BISULFATE 75 MG/1
75 TABLET ORAL DAILY
Qty: 90 TABLET | Refills: 3 | Status: SHIPPED | OUTPATIENT
Start: 2023-02-28 | End: 2024-06-10

## 2023-02-28 RX ORDER — CLOPIDOGREL BISULFATE 75 MG/1
75 TABLET ORAL DAILY
Qty: 90 TABLET | Refills: 3 | Status: SHIPPED | OUTPATIENT
Start: 2023-02-28 | End: 2023-02-28

## 2023-03-07 ENCOUNTER — PATIENT MESSAGE (OUTPATIENT)
Dept: INTERNAL MEDICINE | Facility: CLINIC | Age: 58
End: 2023-03-07
Payer: COMMERCIAL

## 2023-03-07 DIAGNOSIS — I10 HYPERTENSION, ESSENTIAL: ICD-10-CM

## 2023-03-07 DIAGNOSIS — Z87.891 PERSONAL HISTORY OF NICOTINE DEPENDENCE: Primary | ICD-10-CM

## 2023-03-07 DIAGNOSIS — E78.49 OTHER HYPERLIPIDEMIA: ICD-10-CM

## 2023-03-13 ENCOUNTER — OFFICE VISIT (OUTPATIENT)
Dept: VASCULAR SURGERY | Facility: CLINIC | Age: 58
End: 2023-03-13
Payer: COMMERCIAL

## 2023-03-13 ENCOUNTER — HOSPITAL ENCOUNTER (OUTPATIENT)
Dept: VASCULAR SURGERY | Facility: CLINIC | Age: 58
Discharge: HOME | End: 2023-03-13
Attending: SURGERY
Payer: COMMERCIAL

## 2023-03-13 VITALS
WEIGHT: 202.81 LBS | BODY MASS INDEX: 33.79 KG/M2 | SYSTOLIC BLOOD PRESSURE: 134 MMHG | HEIGHT: 65 IN | DIASTOLIC BLOOD PRESSURE: 83 MMHG | HEART RATE: 83 BPM | TEMPERATURE: 98 F

## 2023-03-13 DIAGNOSIS — I73.9 PAD (PERIPHERAL ARTERY DISEASE): Primary | ICD-10-CM

## 2023-03-13 DIAGNOSIS — I73.9 PAD (PERIPHERAL ARTERY DISEASE): ICD-10-CM

## 2023-03-13 PROCEDURE — 1159F MED LIST DOCD IN RCRD: CPT | Mod: CPTII,S$GLB,, | Performed by: SURGERY

## 2023-03-13 PROCEDURE — 93925 LOWER EXTREMITY STUDY: CPT | Mod: S$GLB,,, | Performed by: SURGERY

## 2023-03-13 PROCEDURE — 1159F PR MEDICATION LIST DOCUMENTED IN MEDICAL RECORD: ICD-10-PCS | Mod: CPTII,S$GLB,, | Performed by: SURGERY

## 2023-03-13 PROCEDURE — 3075F SYST BP GE 130 - 139MM HG: CPT | Mod: CPTII,S$GLB,, | Performed by: SURGERY

## 2023-03-13 PROCEDURE — 93925 PR DUPLEX LO EXTREM ART BILAT: ICD-10-PCS | Mod: S$GLB,,, | Performed by: SURGERY

## 2023-03-13 PROCEDURE — 99213 PR OFFICE/OUTPT VISIT, EST, LEVL III, 20-29 MIN: ICD-10-PCS | Mod: S$GLB,,, | Performed by: SURGERY

## 2023-03-13 PROCEDURE — 93923 PR NON-INVASIVE PHYSIOLOGIC STUDY EXTREMITY 3 LEVELS: ICD-10-PCS | Mod: S$GLB,,, | Performed by: SURGERY

## 2023-03-13 PROCEDURE — 99999 PR PBB SHADOW E&M-EST. PATIENT-LVL III: ICD-10-PCS | Mod: PBBFAC,,, | Performed by: SURGERY

## 2023-03-13 PROCEDURE — 3075F PR MOST RECENT SYSTOLIC BLOOD PRESS GE 130-139MM HG: ICD-10-PCS | Mod: CPTII,S$GLB,, | Performed by: SURGERY

## 2023-03-13 PROCEDURE — 3008F BODY MASS INDEX DOCD: CPT | Mod: CPTII,S$GLB,, | Performed by: SURGERY

## 2023-03-13 PROCEDURE — 93923 UPR/LXTR ART STDY 3+ LVLS: CPT | Mod: S$GLB,,, | Performed by: SURGERY

## 2023-03-13 PROCEDURE — 3079F PR MOST RECENT DIASTOLIC BLOOD PRESSURE 80-89 MM HG: ICD-10-PCS | Mod: CPTII,S$GLB,, | Performed by: SURGERY

## 2023-03-13 PROCEDURE — 99999 PR PBB SHADOW E&M-EST. PATIENT-LVL III: CPT | Mod: PBBFAC,,, | Performed by: SURGERY

## 2023-03-13 PROCEDURE — 99213 OFFICE O/P EST LOW 20 MIN: CPT | Mod: S$GLB,,, | Performed by: SURGERY

## 2023-03-13 PROCEDURE — 3008F PR BODY MASS INDEX (BMI) DOCUMENTED: ICD-10-PCS | Mod: CPTII,S$GLB,, | Performed by: SURGERY

## 2023-03-13 PROCEDURE — 3079F DIAST BP 80-89 MM HG: CPT | Mod: CPTII,S$GLB,, | Performed by: SURGERY

## 2023-03-13 NOTE — PROGRESS NOTES
Patient returns following redo iliac stenting.  He is walking fine.  Good femoral pulses.  His complaint is lower back pain right when standing up.  He is put on about 15 lb.  In addition to that he is not as active as he used to be.  His ultrasound was reviewed and his iliac stents are wide open.  I reviewed them personally with the ultrasound tech who did the procedure.      We spent time talking about his back problem.  He is going to ask his primary care doctor to help him get a discounted down once we can go in swim.  I have instructed him to go online and look for series of exercises for lower back pain.  He is got a stretch and do that.  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  All of his questions were answered we will see him back in 6 months with a repeat ultrasound thank you

## 2023-03-14 ENCOUNTER — PATIENT MESSAGE (OUTPATIENT)
Dept: INTERNAL MEDICINE | Facility: CLINIC | Age: 58
End: 2023-03-14
Payer: COMMERCIAL

## 2023-03-14 NOTE — TELEPHONE ENCOUNTER
Consults placed.    He may need a clearance prior to starting a vigorous exercise program.    Thank you

## 2023-03-16 DIAGNOSIS — I73.9 PAD (PERIPHERAL ARTERY DISEASE): Primary | ICD-10-CM

## 2023-03-27 ENCOUNTER — OFFICE VISIT (OUTPATIENT)
Dept: UROLOGY | Facility: CLINIC | Age: 58
End: 2023-03-27
Payer: COMMERCIAL

## 2023-03-27 VITALS
WEIGHT: 202.81 LBS | DIASTOLIC BLOOD PRESSURE: 82 MMHG | BODY MASS INDEX: 33.79 KG/M2 | HEIGHT: 65 IN | SYSTOLIC BLOOD PRESSURE: 126 MMHG | HEART RATE: 96 BPM

## 2023-03-27 DIAGNOSIS — N13.8 BPH WITH URINARY OBSTRUCTION: Primary | ICD-10-CM

## 2023-03-27 DIAGNOSIS — N40.1 BPH WITH URINARY OBSTRUCTION: Primary | ICD-10-CM

## 2023-03-27 DIAGNOSIS — E78.49 OTHER HYPERLIPIDEMIA: ICD-10-CM

## 2023-03-27 DIAGNOSIS — N52.01 ERECTILE DYSFUNCTION DUE TO ARTERIAL INSUFFICIENCY: ICD-10-CM

## 2023-03-27 DIAGNOSIS — I10 HYPERTENSION, ESSENTIAL: ICD-10-CM

## 2023-03-27 PROCEDURE — 3008F BODY MASS INDEX DOCD: CPT | Mod: CPTII,S$GLB,, | Performed by: UROLOGY

## 2023-03-27 PROCEDURE — 99999 PR PBB SHADOW E&M-EST. PATIENT-LVL III: ICD-10-PCS | Mod: PBBFAC,,, | Performed by: UROLOGY

## 2023-03-27 PROCEDURE — 99214 OFFICE O/P EST MOD 30 MIN: CPT | Mod: S$GLB,,, | Performed by: UROLOGY

## 2023-03-27 PROCEDURE — 3008F PR BODY MASS INDEX (BMI) DOCUMENTED: ICD-10-PCS | Mod: CPTII,S$GLB,, | Performed by: UROLOGY

## 2023-03-27 PROCEDURE — 4010F PR ACE/ARB THEARPY RXD/TAKEN: ICD-10-PCS | Mod: CPTII,S$GLB,, | Performed by: UROLOGY

## 2023-03-27 PROCEDURE — 1160F RVW MEDS BY RX/DR IN RCRD: CPT | Mod: CPTII,S$GLB,, | Performed by: UROLOGY

## 2023-03-27 PROCEDURE — 99214 PR OFFICE/OUTPT VISIT, EST, LEVL IV, 30-39 MIN: ICD-10-PCS | Mod: S$GLB,,, | Performed by: UROLOGY

## 2023-03-27 PROCEDURE — 3074F SYST BP LT 130 MM HG: CPT | Mod: CPTII,S$GLB,, | Performed by: UROLOGY

## 2023-03-27 PROCEDURE — 3079F PR MOST RECENT DIASTOLIC BLOOD PRESSURE 80-89 MM HG: ICD-10-PCS | Mod: CPTII,S$GLB,, | Performed by: UROLOGY

## 2023-03-27 PROCEDURE — 3079F DIAST BP 80-89 MM HG: CPT | Mod: CPTII,S$GLB,, | Performed by: UROLOGY

## 2023-03-27 PROCEDURE — 3074F PR MOST RECENT SYSTOLIC BLOOD PRESSURE < 130 MM HG: ICD-10-PCS | Mod: CPTII,S$GLB,, | Performed by: UROLOGY

## 2023-03-27 PROCEDURE — 99999 PR PBB SHADOW E&M-EST. PATIENT-LVL III: CPT | Mod: PBBFAC,,, | Performed by: UROLOGY

## 2023-03-27 PROCEDURE — 1160F PR REVIEW ALL MEDS BY PRESCRIBER/CLIN PHARMACIST DOCUMENTED: ICD-10-PCS | Mod: CPTII,S$GLB,, | Performed by: UROLOGY

## 2023-03-27 PROCEDURE — 4010F ACE/ARB THERAPY RXD/TAKEN: CPT | Mod: CPTII,S$GLB,, | Performed by: UROLOGY

## 2023-03-27 PROCEDURE — 1159F PR MEDICATION LIST DOCUMENTED IN MEDICAL RECORD: ICD-10-PCS | Mod: CPTII,S$GLB,, | Performed by: UROLOGY

## 2023-03-27 PROCEDURE — 1159F MED LIST DOCD IN RCRD: CPT | Mod: CPTII,S$GLB,, | Performed by: UROLOGY

## 2023-03-27 RX ORDER — FINASTERIDE 5 MG/1
5 TABLET, FILM COATED ORAL DAILY
Qty: 90 TABLET | Refills: 3 | Status: SHIPPED | OUTPATIENT
Start: 2023-03-27 | End: 2024-03-26

## 2023-03-27 RX ORDER — TAMSULOSIN HYDROCHLORIDE 0.4 MG/1
1 CAPSULE ORAL DAILY
Qty: 90 CAPSULE | Refills: 3 | Status: SHIPPED | OUTPATIENT
Start: 2023-03-27

## 2023-03-27 RX ORDER — SILDENAFIL 100 MG/1
100 TABLET, FILM COATED ORAL DAILY PRN
Qty: 30 TABLET | Refills: 2 | Status: SHIPPED | OUTPATIENT
Start: 2023-03-27 | End: 2023-11-24 | Stop reason: SDUPTHER

## 2023-03-28 ENCOUNTER — CLINICAL SUPPORT (OUTPATIENT)
Dept: SMOKING CESSATION | Facility: CLINIC | Age: 58
End: 2023-03-28
Payer: COMMERCIAL

## 2023-03-28 DIAGNOSIS — F17.200 NICOTINE DEPENDENCE: Primary | ICD-10-CM

## 2023-03-28 PROCEDURE — 99999 PR PBB SHADOW E&M-EST. PATIENT-LVL II: ICD-10-PCS | Mod: PBBFAC,,,

## 2023-03-28 PROCEDURE — 99999 PR PBB SHADOW E&M-EST. PATIENT-LVL II: CPT | Mod: PBBFAC,,,

## 2023-03-28 PROCEDURE — 99404 PR PREVENT COUNSEL,INDIV,60 MIN: ICD-10-PCS | Mod: S$GLB,,,

## 2023-03-28 PROCEDURE — 99404 PREV MED CNSL INDIV APPRX 60: CPT | Mod: S$GLB,,,

## 2023-03-28 RX ORDER — VARENICLINE TARTRATE 1 MG/1
TABLET, FILM COATED ORAL
Qty: 56 TABLET | Refills: 0 | Status: SHIPPED | OUTPATIENT
Start: 2023-03-28 | End: 2023-05-11 | Stop reason: SDUPTHER

## 2023-03-28 RX ORDER — DM/P-EPHED/ACETAMINOPH/DOXYLAM 30-7.5/3
LIQUID (ML) ORAL
Qty: 288 LOZENGE | Refills: 0 | Status: SHIPPED | OUTPATIENT
Start: 2023-03-28 | End: 2023-03-29

## 2023-03-28 NOTE — Clinical Note
Patient will be participating in biweekly tobacco cessation meetings and will begin the prescribed tobacco cessation medication regimen of Chantix starter pack  and 2 mg nicotine gum . Patient denies any low moods or SI/HI , AZ-D score is 2  Patient currently smokes <20 cigarettes per day.   Pt's exhaled carbon monoxide level was  11 *ppm as per Smokerlyzer. (non- smoker = 0-5 ppm.) Will see pt back in office in 2 weeks.Please see Tobacco Cessation Intake Form for patient assessment and recommendations.

## 2023-03-29 RX ORDER — MICONAZOLE NITRATE 2 %
CREAM (GRAM) TOPICAL
Qty: 330 EACH | Refills: 0 | Status: SHIPPED | OUTPATIENT
Start: 2023-03-29 | End: 2023-05-11

## 2023-03-29 NOTE — PROGRESS NOTES
PPatient will be participating in biweekly tobacco cessation meetings and will begin the prescribed tobacco cessation medication regimen of Chantix starter pack  and 2 mg nicotine gum . Patient denies any low moods or SI/HI , AZ-D score is 2  Patient currently smokes <20 cigarettes per day.   Pt's exhaled carbon monoxide level was  11 *ppm as per Smokerlyzer. (non- smoker = 0-5 ppm.) Will see pt back in office in 2 weeks.Please see Tobacco Cessation Intake Form for patient assessment and recommendations.

## 2023-04-05 ENCOUNTER — HOSPITAL ENCOUNTER (OUTPATIENT)
Dept: RADIOLOGY | Facility: HOSPITAL | Age: 58
Discharge: HOME | End: 2023-04-05
Attending: INTERNAL MEDICINE
Payer: COMMERCIAL

## 2023-04-05 DIAGNOSIS — Z87.891 PERSONAL HISTORY OF NICOTINE DEPENDENCE: Primary | ICD-10-CM

## 2023-04-05 DIAGNOSIS — R59.0 LOCALIZED ENLARGED LYMPH NODES: ICD-10-CM

## 2023-04-05 PROCEDURE — 71250 CT THORAX DX C-: CPT | Mod: TC

## 2023-04-05 PROCEDURE — 71250 CT CHEST WITHOUT CONTRAST: ICD-10-PCS | Mod: 26,,, | Performed by: RADIOLOGY

## 2023-04-05 PROCEDURE — 71250 CT THORAX DX C-: CPT | Mod: 26,,, | Performed by: RADIOLOGY

## 2023-04-05 NOTE — PROGRESS NOTES
Mr. Colin,    I reviewed the results of your CT Chest.  Everything looks stable.  I would recommend you continue annual lung cancer surveillance imaging.      Dr. GRAVES

## 2023-04-13 ENCOUNTER — CLINICAL SUPPORT (OUTPATIENT)
Dept: SMOKING CESSATION | Facility: CLINIC | Age: 58
End: 2023-04-13
Payer: COMMERCIAL

## 2023-04-13 DIAGNOSIS — F17.200 NICOTINE DEPENDENCE: Primary | ICD-10-CM

## 2023-04-13 PROCEDURE — 99404 PREV MED CNSL INDIV APPRX 60: CPT | Mod: S$GLB,,,

## 2023-04-13 PROCEDURE — 99999 PR PBB SHADOW E&M-EST. PATIENT-LVL I: ICD-10-PCS | Mod: PBBFAC,,,

## 2023-04-13 PROCEDURE — 99999 PR PBB SHADOW E&M-EST. PATIENT-LVL I: CPT | Mod: PBBFAC,,,

## 2023-04-13 PROCEDURE — 99404 PR PREVENT COUNSEL,INDIV,60 MIN: ICD-10-PCS | Mod: S$GLB,,,

## 2023-04-13 NOTE — PROGRESS NOTES
Individual Follow-Up Form    4/13/2023    Quit Date: 4/8/23    Clinical Status of Patient: Outpatient        Continuing Medication: yes  Chantix or Nicotine gum    Other Medications: --     Target Symptoms: Withdrawal and medication side effects. The following were  rated moderate (3) to severe (4) on TCRS:  Moderate (3): ---  Severe (4): ---    Comments: Patient seen in office today. Patient is tobacco free since 4/8/23  Pt remains on tobacco cessation medication of chantix 1 mg BID    No adverse effects/mental changes noted at this time. Reviewed coping strategies/habitual behavior/relapse prevention with patient. Exhaled carbon monoxide level was 2 ppm per Smokerlyzer (non- smoker = 0-5 ppm .). Will see pt back in office in 2 weeks. Session #1 Reviewed learned addiction model, personal reasons for quitting, medications, goals, quit date.   Congratulated patient on his success.       Diagnosis: F17.210    Next Visit: 2 weeks

## 2023-04-27 ENCOUNTER — CLINICAL SUPPORT (OUTPATIENT)
Dept: SMOKING CESSATION | Facility: CLINIC | Age: 58
End: 2023-04-27
Payer: COMMERCIAL

## 2023-04-27 DIAGNOSIS — F17.200 NICOTINE DEPENDENCE: Primary | ICD-10-CM

## 2023-04-27 PROCEDURE — 99999 PR PBB SHADOW E&M-EST. PATIENT-LVL I: CPT | Mod: PBBFAC,,,

## 2023-04-27 PROCEDURE — 99404 PR PREVENT COUNSEL,INDIV,60 MIN: ICD-10-PCS | Mod: S$GLB,,,

## 2023-04-27 PROCEDURE — 99404 PREV MED CNSL INDIV APPRX 60: CPT | Mod: S$GLB,,,

## 2023-04-27 PROCEDURE — 99999 PR PBB SHADOW E&M-EST. PATIENT-LVL I: ICD-10-PCS | Mod: PBBFAC,,,

## 2023-04-27 NOTE — PROGRESS NOTES
Individual Follow-Up Form    4/27/2023    Quit Date: 4/9/23    Clinical Status of Patient: Outpatient        Continuing Medication: yes  Chantix    Other Medications: --     Target Symptoms: Withdrawal and medication side effects. The following were  rated moderate (3) to severe (4) on TCRS:  Moderate (3): --  Severe (4): --    Comments: Patient seen in office today. Patient is tobacco free since 4/9/23.  Pt remains on tobacco cessation medication of chantix 1 mg BID    No adverse effects/mental changes noted at this time. Reviewed coping strategies/habitual behavior/relapse prevention with patient. . Patient said he recently went to a bar where there was a lot of smoking and he had a beer and he did not have a cigarette . He still experiencing some desire /crave for a cigarette but he does not act on it . Will see pt back in office in 2 weeks.  Congratulated patient on his success.       Diagnosis: F17.210    Next Visit: 2 weeks

## 2023-05-11 ENCOUNTER — CLINICAL SUPPORT (OUTPATIENT)
Dept: SMOKING CESSATION | Facility: CLINIC | Age: 58
End: 2023-05-11
Payer: COMMERCIAL

## 2023-05-11 DIAGNOSIS — F17.200 NICOTINE DEPENDENCE: ICD-10-CM

## 2023-05-11 PROCEDURE — 99404 PR PREVENT COUNSEL,INDIV,60 MIN: ICD-10-PCS | Mod: S$GLB,,,

## 2023-05-11 PROCEDURE — 99404 PREV MED CNSL INDIV APPRX 60: CPT | Mod: S$GLB,,,

## 2023-05-11 PROCEDURE — 99999 PR PBB SHADOW E&M-EST. PATIENT-LVL II: CPT | Mod: PBBFAC,,,

## 2023-05-11 PROCEDURE — 99999 PR PBB SHADOW E&M-EST. PATIENT-LVL II: ICD-10-PCS | Mod: PBBFAC,,,

## 2023-05-11 RX ORDER — VARENICLINE TARTRATE 1 MG/1
1 TABLET, FILM COATED ORAL 2 TIMES DAILY
Qty: 56 TABLET | Refills: 0 | Status: SHIPPED | OUTPATIENT
Start: 2023-05-11 | End: 2023-06-22

## 2023-05-11 NOTE — PROGRESS NOTES
Individual Follow-Up Form    5/11/2023    Quit Date: 4/9/23    Clinical Status of Patient: Outpatient      Continuing Medication: yes  Chantix    Other Medications: --     Target Symptoms: Withdrawal and medication side effects. The following were  rated moderate (3) to severe (4) on TCRS:  Moderate (3): ---  Severe (4): ---    Comments: Patient seen in office today. Patient is tobacco free since 4/9/23.  Pt remains on tobacco cessation medication of chantix 1 mg BID    No adverse effects/mental changes noted at this time. Reviewed coping strategies/habitual behavior/relapse prevention with patient.  Discussion included crave/desire and using a cigarette for relaxation . He craves when in his truck on the way home from work and after dinner . Discussed stressful situations and to be prepared for them and have a game plan .  Reviewed strategies, cues, and triggers. Introduced the negative impact of tobacco on health, the health advantages of discontinuing the use of tobacco, time line improved health changes after a quit, withdrawal issues to expect from nicotine and habit, and ways to stay quit . Refilled his chantix , he is feeling really good but playing it safe and not rush to discountinue that therapy at this time. Patient no longer using nrt gum , it was discountinued .  Will see pt back in office in 2 weeks.  Congratulated patient on his  success.       Diagnosis: F17.210    Next Visit: 2 weeks

## 2023-05-11 NOTE — Clinical Note
Patient is tobacco free since 4/9/23.  Pt remains on tobacco cessation medication of chantix 1 mg BID    No adverse effects/mental changes noted at this time. Reviewed coping strategies/habitual behavior/relapse prevention with patient.  Discussion included crave/desire and using a cigarette for relaxation . He craves when in his truck on the way home from work and after dinner . Discussed stressful situations and to be prepared for them and have a game plan .  Reviewed strategies, cues, and triggers. Introduced the negative impact of tobacco on health, the health advantages of discontinuing the use of tobacco, time line improved health changes after a quit, withdrawal issues to expect from nicotine and habit, and ways to stay quit . Refilled his chantix , he is feeling really good but playing it safe and not rush to discountinue that therapy at this time. Patient no longer using nrt gum , it was discountinued .  Will see pt back in office in 2 weeks.  Congratulated patient on his  success.

## 2023-06-08 ENCOUNTER — CLINICAL SUPPORT (OUTPATIENT)
Dept: SMOKING CESSATION | Facility: CLINIC | Age: 58
End: 2023-06-08
Payer: COMMERCIAL

## 2023-06-08 DIAGNOSIS — F17.200 NICOTINE DEPENDENCE: Primary | ICD-10-CM

## 2023-06-08 PROCEDURE — 99999 PR PBB SHADOW E&M-EST. PATIENT-LVL I: ICD-10-PCS | Mod: PBBFAC,,,

## 2023-06-08 PROCEDURE — 99999 PR PBB SHADOW E&M-EST. PATIENT-LVL I: CPT | Mod: PBBFAC,,,

## 2023-06-08 PROCEDURE — 99403 PR PREVENT COUNSEL,INDIV,45 MIN: ICD-10-PCS | Mod: S$GLB,,,

## 2023-06-08 PROCEDURE — 99403 PREV MED CNSL INDIV APPRX 45: CPT | Mod: S$GLB,,,

## 2023-06-08 NOTE — PROGRESS NOTES
Individual Follow-Up Form    6/8/2023    Quit Date: 4/9/23    Clinical Status of Patient: Outpatient    Continuing Medication: yes  Chantix    Other Medications: none     Target Symptoms: Withdrawal and medication side effects. The following were  rated moderate (3) to severe (4) on TCRS:  Moderate (3): crave/desire  Severe (4): none    Comments: Telephonic visit .  Patient is tobacco free.  Pt remains on tobacco cessation medication of  1 mg Varenicline  QD.   No adverse effects noted at this time. Congratulated patient on his success Reviewed coping strategies/habitual behavior/relapse prevention with patient. Pt is happy to have met goal and is realistic about craves. Pt will try to discontinue Chantix this week.  Patient understands he can call CTTS at any time.      Diagnosis: F17.200    Next Visit: 2 weeks

## 2023-06-22 ENCOUNTER — CLINICAL SUPPORT (OUTPATIENT)
Dept: SMOKING CESSATION | Facility: CLINIC | Age: 58
End: 2023-06-22
Payer: COMMERCIAL

## 2023-06-22 DIAGNOSIS — F17.200 NICOTINE DEPENDENCE: Primary | ICD-10-CM

## 2023-06-22 PROCEDURE — 99404 PREV MED CNSL INDIV APPRX 60: CPT | Mod: S$GLB,,,

## 2023-06-22 PROCEDURE — 99999 PR PBB SHADOW E&M-EST. PATIENT-LVL II: CPT | Mod: PBBFAC,,,

## 2023-06-22 PROCEDURE — 99404 PR PREVENT COUNSEL,INDIV,60 MIN: ICD-10-PCS | Mod: S$GLB,,,

## 2023-06-22 PROCEDURE — 99999 PR PBB SHADOW E&M-EST. PATIENT-LVL II: ICD-10-PCS | Mod: PBBFAC,,,

## 2023-06-22 NOTE — PROGRESS NOTES
"Individual Follow-Up Form    6/22/2023    Quit Date: 4/9/2023    Clinical Status of Patient: Outpatient    Continuing Medication: no    Other Medications: none    Comments:  Patient seen in office today. Patient is tobacco free since 4/9/2023.  Reviewed coping strategies/habitual behavior/relapse prevention with patient.  Will see pt back in office in 2 weeks.  Congratulated patient on his success.   Pt states he "stopped the Chantix." Pt shares he is happy he is feeling better and states he avoids cigarettes by saying to himself "I don't want to be a smoker anymore". Pt shares struggling with fear of losing his Tuesday night ritual of enjoying time with his friends at a smoking bar. Pt accepted his quit coin. He shares he will be traveling for work to [employer] [place] and living there for two years. He fears not having his wife around to hold him accountable and wants to be able to hold himself accountable. Will continue follow-up visits via telephone. Patient understands she can call CTTS at any time.    Diagnosis: F17.200    Next Visit: 2 weeks    "

## 2023-07-20 ENCOUNTER — CLINICAL SUPPORT (OUTPATIENT)
Dept: SMOKING CESSATION | Facility: CLINIC | Age: 58
End: 2023-07-20
Payer: COMMERCIAL

## 2023-07-20 DIAGNOSIS — F17.200 NICOTINE DEPENDENCE: Primary | ICD-10-CM

## 2023-07-20 PROCEDURE — 99999 PR PBB SHADOW E&M-EST. PATIENT-LVL II: ICD-10-PCS | Mod: PBBFAC,,,

## 2023-07-20 PROCEDURE — 99403 PR PREVENT COUNSEL,INDIV,45 MIN: ICD-10-PCS | Mod: S$GLB,,,

## 2023-07-20 PROCEDURE — 99999 PR PBB SHADOW E&M-EST. PATIENT-LVL II: CPT | Mod: PBBFAC,,,

## 2023-07-20 PROCEDURE — 99403 PREV MED CNSL INDIV APPRX 45: CPT | Mod: S$GLB,,,

## 2023-07-20 NOTE — PROGRESS NOTES
Individual Follow-Up Form    7/20/2023    Quit Date: 4/9/23    Clinical Status of Patient: Outpatient        Continuing Medication: no    Other Medications: ---     Target Symptoms: Withdrawal and medication side effects. The following were  rated moderate (3) to severe (4) on TCRS:  Moderate (3): ---  Severe (4): ---    Comments: Telephonic visit .    Patient is tobacco free.  Congratulated patient on his /her success Reviewed coping strategies/habitual behavior/relapse prevention with patient.  Patient will be working in [place], [place] and will be living there for 2 months , he wishes to continue with his appointments but will no longer be able to come in to clinic , we will do virtual or phone call visits . Patient's benefits are up for renewal , he wishes to renew it because he wishes to have biweekly appointments to help maintain his quit . Patient understands he can call CTTS at any time . Discussed being in a new environment and  being away from home can be stressful and possible trigger craving . Patient is said no he wants this to be quit for good , he works hard to maintain it . Commended him on his accomplishments and desire to follow up . Scheduled a phone visit for 2 weeks .       Diagnosis: F17.210    Next Visit: 2 weeks

## 2023-08-02 DIAGNOSIS — E78.5 HYPERLIPIDEMIA, UNSPECIFIED HYPERLIPIDEMIA TYPE: ICD-10-CM

## 2023-08-02 DIAGNOSIS — I73.9 PAD (PERIPHERAL ARTERY DISEASE): ICD-10-CM

## 2023-08-02 RX ORDER — ROSUVASTATIN CALCIUM 40 MG/1
TABLET, COATED ORAL
Qty: 90 TABLET | Refills: 0 | Status: SHIPPED | OUTPATIENT
Start: 2023-08-02 | End: 2023-10-30

## 2023-08-02 NOTE — TELEPHONE ENCOUNTER
Provider Staff:  Action required for this patient     Please see care gap opportunities below in Care Due Message.    Thanks!  [facility]     Appointments      Date Provider   Last Visit   10/17/2022 Horace Weber MD   Next Visit   Visit date not found Horace Weber MD     Refill Decision Note   Nanda Colin  is requesting a refill authorization.  Brief Assessment and Rationale for Refill:  Approve     Medication Therapy Plan:         Comments:     Note composed:8:35 AM 08/02/2023             Appointments     Last Visit   10/17/2022 Horace Weber MD   Next Visit   Visit date not found Horace Weber MD

## 2023-08-02 NOTE — TELEPHONE ENCOUNTER
Care Due:                  Date            Visit Type   Department     Provider  --------------------------------------------------------------------------------                                MYCHART                              ANNUAL                              CHECKUP/PHY  [facility] INTERNAL  Last Visit: 10-      [facility]       SHERRIE CHOI  Next Visit: None Scheduled  None         None Found                                                            Last  Test          Frequency    Reason                     Performed    Due Date  --------------------------------------------------------------------------------    Office Visit  12 months..  losartan, rosuvastatin...  10-   10-    CMP.........  12 months..  losartan.................  10-   10-    [employer] Embedded Care Due Messages. Reference number: [id number].   8/02/2023 12:17:42 AM CDT

## 2023-08-03 ENCOUNTER — CLINICAL SUPPORT (OUTPATIENT)
Dept: SMOKING CESSATION | Facility: CLINIC | Age: 58
End: 2023-08-03
Payer: COMMERCIAL

## 2023-08-03 DIAGNOSIS — F17.200 NICOTINE DEPENDENCE: Primary | ICD-10-CM

## 2023-08-03 PROCEDURE — 99402 PR PREVENT COUNSEL,INDIV,30 MIN: ICD-10-PCS | Mod: S$GLB,,,

## 2023-08-03 PROCEDURE — 99999 PR PBB SHADOW E&M-EST. PATIENT-LVL II: CPT | Mod: PBBFAC,,,

## 2023-08-03 PROCEDURE — 99402 PREV MED CNSL INDIV APPRX 30: CPT | Mod: S$GLB,,,

## 2023-08-03 PROCEDURE — 99999 PR PBB SHADOW E&M-EST. PATIENT-LVL II: ICD-10-PCS | Mod: PBBFAC,,,

## 2023-08-03 NOTE — PROGRESS NOTES
Individual Follow-Up Form    8/3/2023    Quit Date: 4/9/23    Clinical Status of Patient: Outpatient      Continuing Medication: no         Target Symptoms: Withdrawal and medication side effects. The following were  rated moderate (3) to severe (4) on TCRS:  Moderate (3): occasional cravings   Severe (4): none     Comments: .Patient remains quit and is doing very well . He does still have some crave but he is dealing with it well and it is getting easier . 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 He is moving around a lot and will be away from home for the next 2-3 months with some weekend home visits . He shares that he does well driving ( he used to smoke while driving ) and when he returns home to his familiar surroundings . Encouraged him to keep up his great work , but he expressed that he would like to keep a visit every two weeks for now to help him maintain his quit  Reviewed coping strategies/habitual behavior/relapse prevention with patient.   Patient understands he can call CTTS at any time.        Diagnosis: F17.210    Next Visit: 2 weeks

## 2023-08-22 ENCOUNTER — CLINICAL SUPPORT (OUTPATIENT)
Dept: SMOKING CESSATION | Facility: CLINIC | Age: 58
End: 2023-08-22
Payer: COMMERCIAL

## 2023-08-22 DIAGNOSIS — F17.200 NICOTINE DEPENDENCE: Primary | ICD-10-CM

## 2023-08-22 PROCEDURE — 99402 PREV MED CNSL INDIV APPRX 30: CPT | Mod: S$GLB,,,

## 2023-08-22 PROCEDURE — 99999 PR PBB SHADOW E&M-EST. PATIENT-LVL II: CPT | Mod: PBBFAC,,,

## 2023-08-22 PROCEDURE — 99999 PR PBB SHADOW E&M-EST. PATIENT-LVL II: ICD-10-PCS | Mod: PBBFAC,,,

## 2023-08-22 PROCEDURE — 99402 PR PREVENT COUNSEL,INDIV,30 MIN: ICD-10-PCS | Mod: S$GLB,,,

## 2023-08-22 NOTE — PROGRESS NOTES
Individual Follow-Up Form    8/22/2023    Quit Date: 4/9/23    Clinical Status of Patient: Outpatient        Continuing Medication: no    Other Medications:      Target Symptoms: Withdrawal and medication side effects. The following were  rated moderate (3) to severe (4) on TCRS:  Moderate (3): none  Severe (4): none    Comments: Telephonic visit .    Patient is tobacco free.  Pt remains is not using any smoking cessation medications .  Congratulated patient on his continued success Reviewed coping strategies/habitual behavior/relapse prevention with patient.  Patient said he feels that he can stop our visits at this time , he feels very confident that he will not return to smoking . Patient understands if this changes in anyway he should call CTTS at any time.       Diagnosis: F17.210    Next Visit: finished program

## 2023-08-23 ENCOUNTER — PATIENT MESSAGE (OUTPATIENT)
Dept: INTERNAL MEDICINE | Facility: CLINIC | Age: 58
End: 2023-08-23
Payer: COMMERCIAL

## 2023-08-23 DIAGNOSIS — Z00.00 HEALTHCARE MAINTENANCE: Primary | ICD-10-CM

## 2023-08-23 DIAGNOSIS — E78.5 HYPERLIPIDEMIA, UNSPECIFIED HYPERLIPIDEMIA TYPE: ICD-10-CM

## 2023-08-23 DIAGNOSIS — Z12.5 PROSTATE CANCER SCREENING: ICD-10-CM

## 2023-08-23 DIAGNOSIS — I10 HYPERTENSION, ESSENTIAL: ICD-10-CM

## 2023-08-23 DIAGNOSIS — Z00.00 ANNUAL PHYSICAL EXAM: ICD-10-CM

## 2023-08-24 NOTE — TELEPHONE ENCOUNTER
Put in annual fasting blood and urine orders/healthcare maintenance orders. Scheduled appt and responded to portal msg.

## 2023-09-11 ENCOUNTER — PATIENT MESSAGE (OUTPATIENT)
Dept: VASCULAR SURGERY | Facility: CLINIC | Age: 58
End: 2023-09-11
Payer: COMMERCIAL

## 2023-09-13 ENCOUNTER — PATIENT MESSAGE (OUTPATIENT)
Dept: VASCULAR SURGERY | Facility: CLINIC | Age: 58
End: 2023-09-13
Payer: COMMERCIAL

## 2023-10-30 DIAGNOSIS — E78.5 HYPERLIPIDEMIA, UNSPECIFIED HYPERLIPIDEMIA TYPE: ICD-10-CM

## 2023-10-30 DIAGNOSIS — I73.9 PAD (PERIPHERAL ARTERY DISEASE): ICD-10-CM

## 2023-10-30 RX ORDER — ROSUVASTATIN CALCIUM 40 MG/1
TABLET, COATED ORAL
Qty: 90 TABLET | Refills: 0 | Status: SHIPPED | OUTPATIENT
Start: 2023-10-30 | End: 2023-11-24 | Stop reason: DRUGHIGH

## 2023-10-30 NOTE — TELEPHONE ENCOUNTER
Refill Decision Note   Nanda Colin  is requesting a refill authorization.  Brief Assessment and Rationale for Refill:  Approve     Medication Therapy Plan:  FLOS      Comments:     Note composed:6:22 AM 10/30/2023

## 2023-10-30 NOTE — TELEPHONE ENCOUNTER
Care Due:                  Date            Visit Type   Department     Provider  --------------------------------------------------------------------------------                                MYCHART                              ANNUAL                              CHECKUP/PHY  [facility] INTERNAL  Last Visit: 10-      [facility]       SHERRIE CHOI                              [facility]                              ANNUAL                              CHECKUP/Y  [facility] INTERNAL  Next Visit: 12-      [facility]       SHERRIE CHOI                                                            Last  Test          Frequency    Reason                     Performed    Due Date  --------------------------------------------------------------------------------    CMP.........  12 months..  losartan, rosuvastatin...  10-   10-    Lipid Panel.  12 months..  rosuvastatin.............  01- 01-    [facility] Embedded Care Due Messages. Reference number: [id number].   10/30/2023 12:30:42 AM CDT

## 2023-11-16 DIAGNOSIS — I10 HYPERTENSION, ESSENTIAL: ICD-10-CM

## 2023-11-16 RX ORDER — LOSARTAN POTASSIUM 25 MG/1
25 TABLET ORAL DAILY
Qty: 90 TABLET | Refills: 3 | Status: SHIPPED | OUTPATIENT
Start: 2023-11-16

## 2023-11-16 RX ORDER — CLOTRIMAZOLE 1 %
CREAM (GRAM) TOPICAL 2 TIMES DAILY
Qty: 60 G | Refills: 0 | Status: SHIPPED | OUTPATIENT
Start: 2023-11-16

## 2023-11-16 NOTE — TELEPHONE ENCOUNTER
No care due was identified.  [facility] Embedded Care Due Messages. Reference number: [id number].   11/16/2023 2:23:11 PM CST

## 2023-11-16 NOTE — TELEPHONE ENCOUNTER
Refill Routing Note   Medication(s) are not appropriate for processing by [facility] for the following reason(s):        Required labs outdated  Outside of protocol    [facility] action(s):  Defer  Route               Appointments  past 12m or future 3m with PCP    Date Provider   Last Visit   10/17/2022 Horace Weber MD   Next Visit   12/8/2023 Horace Weber MD   ED visits in past 90 days: 0        Note composed:3:36 PM 11/16/2023

## 2023-11-24 ENCOUNTER — HOSPITAL ENCOUNTER (OUTPATIENT)
Dept: RADIOLOGY | Facility: HOSPITAL | Age: 58
Discharge: HOME | End: 2023-11-24
Attending: FAMILY MEDICINE
Payer: COMMERCIAL

## 2023-11-24 ENCOUNTER — OFFICE VISIT (OUTPATIENT)
Dept: INTERNAL MEDICINE | Facility: CLINIC | Age: 58
End: 2023-11-24
Payer: COMMERCIAL

## 2023-11-24 VITALS
HEART RATE: 82 BPM | SYSTOLIC BLOOD PRESSURE: 130 MMHG | WEIGHT: 201.06 LBS | OXYGEN SATURATION: 98 % | BODY MASS INDEX: 33.5 KG/M2 | DIASTOLIC BLOOD PRESSURE: 80 MMHG | HEIGHT: 65 IN

## 2023-11-24 DIAGNOSIS — Z00.01 ENCOUNTER FOR ANNUAL GENERAL MEDICAL EXAMINATION WITH ABNORMAL FINDINGS IN ADULT: Primary | ICD-10-CM

## 2023-11-24 DIAGNOSIS — E66.9 OBESITY (BMI 30.0-34.9): ICD-10-CM

## 2023-11-24 DIAGNOSIS — I73.9 PAD (PERIPHERAL ARTERY DISEASE): ICD-10-CM

## 2023-11-24 DIAGNOSIS — N40.1 BPH WITH URINARY OBSTRUCTION: ICD-10-CM

## 2023-11-24 DIAGNOSIS — I10 HYPERTENSION, ESSENTIAL: ICD-10-CM

## 2023-11-24 DIAGNOSIS — N13.8 BPH WITH URINARY OBSTRUCTION: ICD-10-CM

## 2023-11-24 DIAGNOSIS — N52.01 ERECTILE DYSFUNCTION DUE TO ARTERIAL INSUFFICIENCY: ICD-10-CM

## 2023-11-24 DIAGNOSIS — Z87.891 PERSONAL HISTORY OF NICOTINE DEPENDENCE: ICD-10-CM

## 2023-11-24 DIAGNOSIS — E78.49 OTHER HYPERLIPIDEMIA: ICD-10-CM

## 2023-11-24 DIAGNOSIS — Z76.89 ENCOUNTER TO ESTABLISH CARE WITH NEW DOCTOR: ICD-10-CM

## 2023-11-24 PROCEDURE — 3079F PR MOST RECENT DIASTOLIC BLOOD PRESSURE 80-89 MM HG: ICD-10-PCS | Mod: CPTII,S$GLB,, | Performed by: FAMILY MEDICINE

## 2023-11-24 PROCEDURE — 99999 PR PBB SHADOW E&M-EST. PATIENT-LVL IV: ICD-10-PCS | Mod: PBBFAC,,, | Performed by: FAMILY MEDICINE

## 2023-11-24 PROCEDURE — 3079F DIAST BP 80-89 MM HG: CPT | Mod: CPTII,S$GLB,, | Performed by: FAMILY MEDICINE

## 2023-11-24 PROCEDURE — 3008F PR BODY MASS INDEX (BMI) DOCUMENTED: ICD-10-PCS | Mod: CPTII,S$GLB,, | Performed by: FAMILY MEDICINE

## 2023-11-24 PROCEDURE — 3075F SYST BP GE 130 - 139MM HG: CPT | Mod: CPTII,S$GLB,, | Performed by: FAMILY MEDICINE

## 2023-11-24 PROCEDURE — 3075F PR MOST RECENT SYSTOLIC BLOOD PRESS GE 130-139MM HG: ICD-10-PCS | Mod: CPTII,S$GLB,, | Performed by: FAMILY MEDICINE

## 2023-11-24 PROCEDURE — 3008F BODY MASS INDEX DOCD: CPT | Mod: CPTII,S$GLB,, | Performed by: FAMILY MEDICINE

## 2023-11-24 PROCEDURE — 4010F PR ACE/ARB THEARPY RXD/TAKEN: ICD-10-PCS | Mod: CPTII,S$GLB,, | Performed by: FAMILY MEDICINE

## 2023-11-24 PROCEDURE — 1160F PR REVIEW ALL MEDS BY PRESCRIBER/CLIN PHARMACIST DOCUMENTED: ICD-10-PCS | Mod: CPTII,S$GLB,, | Performed by: FAMILY MEDICINE

## 2023-11-24 PROCEDURE — 99999 PR PBB SHADOW E&M-EST. PATIENT-LVL IV: CPT | Mod: PBBFAC,,, | Performed by: FAMILY MEDICINE

## 2023-11-24 PROCEDURE — 71271 CT THORAX LUNG CANCER SCR C-: CPT | Mod: TC

## 2023-11-24 PROCEDURE — 1160F RVW MEDS BY RX/DR IN RCRD: CPT | Mod: CPTII,S$GLB,, | Performed by: FAMILY MEDICINE

## 2023-11-24 PROCEDURE — 3044F HG A1C LEVEL LT 7.0%: CPT | Mod: CPTII,S$GLB,, | Performed by: FAMILY MEDICINE

## 2023-11-24 PROCEDURE — 71271 CT CHEST LUNG SCREENING LOW DOSE: ICD-10-PCS | Mod: 26,,, | Performed by: RADIOLOGY

## 2023-11-24 PROCEDURE — 99396 PR PREVENTIVE VISIT,EST,40-64: ICD-10-PCS | Mod: S$GLB,,, | Performed by: FAMILY MEDICINE

## 2023-11-24 PROCEDURE — 1159F PR MEDICATION LIST DOCUMENTED IN MEDICAL RECORD: ICD-10-PCS | Mod: CPTII,S$GLB,, | Performed by: FAMILY MEDICINE

## 2023-11-24 PROCEDURE — 3044F PR MOST RECENT HEMOGLOBIN A1C LEVEL <7.0%: ICD-10-PCS | Mod: CPTII,S$GLB,, | Performed by: FAMILY MEDICINE

## 2023-11-24 PROCEDURE — 1159F MED LIST DOCD IN RCRD: CPT | Mod: CPTII,S$GLB,, | Performed by: FAMILY MEDICINE

## 2023-11-24 PROCEDURE — 71271 CT THORAX LUNG CANCER SCR C-: CPT | Mod: 26,,, | Performed by: RADIOLOGY

## 2023-11-24 PROCEDURE — 4010F ACE/ARB THERAPY RXD/TAKEN: CPT | Mod: CPTII,S$GLB,, | Performed by: FAMILY MEDICINE

## 2023-11-24 PROCEDURE — 99396 PREV VISIT EST AGE 40-64: CPT | Mod: S$GLB,,, | Performed by: FAMILY MEDICINE

## 2023-11-24 RX ORDER — SILDENAFIL 100 MG/1
100 TABLET, FILM COATED ORAL DAILY PRN
Qty: 30 TABLET | Refills: 2 | Status: SHIPPED | OUTPATIENT
Start: 2023-11-24

## 2023-11-24 RX ORDER — ROSUVASTATIN CALCIUM 20 MG/1
20 TABLET, COATED ORAL DAILY
Qty: 90 TABLET | Refills: 3 | Status: SHIPPED | OUTPATIENT
Start: 2023-11-24 | End: 2024-01-29

## 2023-11-24 NOTE — PROGRESS NOTES
Subjective:     Patient ID: Nanda Colin III is a 58 y.o. male.   Chief Complaint: Establish Care    HPI:  Patient presents to establish care.  Patient had fasting labs drawn prior to this exam. Needs refills today and wants to discuss medications.    #Hyperlipidemia  Currently on crestor 40mg. Reports he has had some aching in his joints (knee, hip) since increasing dose from 20mg. Wanting to know what his options are. Review of his prior notes shows that he was on lipitor at some point in the past. No clear indication of why the switch was made and he does not recall the reason either, thinks maybe it was insurance coverage.    #PAD  Long history. Reports he recently had stents in his right leg replaced while in [place]. No recent changes to meds. Reports his stents have failed numerous times due to clotting. Currently on plavix.    #ED  Asking for refill of viagra today. Reports the medication is effective with no adverse side effects.    Review of Problems & History:  Patient Active Problem List   Diagnosis    Erectile dysfunction due to arterial insufficiency    Hypertension, essential    Hyperlipidemia    Colon polyps    BPH with urinary obstruction    Gastroesophageal reflux disease    GINNA (obstructive sleep apnea)    Laceration of left hand without foreign body    Lumbar radiculopathy    PAD (peripheral artery disease)    Right leg pain    Personal history of nicotine dependence    Coronary artery calcification seen on CT scan    Lung granuloma    Mediastinal adenopathy    Obesity (BMI 30.0-34.9)      Past Medical History:   Diagnosis Date    BPH (benign prostatic hypertrophy)     Family history of prostate cancer 8/5/2021    brother    Hernia     Hyperlipidemia     Hypertension, essential 12/30/2015    Incarcerated umbilical hernia 6/25/2016    Umbilical hernia       Past Surgical History:   Procedure Laterality Date    ANGIOGRAPHY OF LOWER EXTREMITY Bilateral 11/9/2022    Procedure: Angiogram  Extremity Unilateral;  Surgeon: Ruperto Bay MD;  Location: [facility] OR 2ND FLR;  Service: Vascular;  Laterality: Bilateral;  3.7 min  1067.83 mGy  125.26 Gy.cm  104ml Dye    AORTOGRAPHY N/A 2022    Procedure: AORTOGRAM;  Surgeon: Ruperto Bay MD;  Location: [facility] OR 2ND FLR;  Service: Vascular;  Laterality: N/A;    AORTOGRAPHY WITH SERIALOGRAPHY N/A 3/15/2022    Procedure: AORTOGRAM, WITH SERIALOGRAPHY;  Surgeon: Ruperto Bay MD;  Location: [facility] OR [facility] FLR;  Service: Vascular;  Laterality: N/A;  aortogram w/ runoff & PTA,    COLONOSCOPY N/A 2019    Procedure: COLONOSCOPY;  Surgeon: Fritz Franco MD;  Location: [facility] ENDO (4TH FLR);  Service: Endoscopy;  Laterality: N/A;  Do not cancel this order    COLONOSCOPY N/A 2023    Procedure: COLONOSCOPY;  Surgeon: Ja Alexander MD;  Location: [facility] ENDO (4TH FLR);  Service: Endoscopy;  Laterality: N/A;  inst portal-any [id number] to hold plavix see te 22  Precall done -AA    HERNIA REPAIR      TRANSFORAMINAL EPIDURAL INJECTION OF STEROID Right 12/15/2021    Procedure: Injection,steroid,epidural,transforaminal approach Right L4 & L5 (May change depending MRI);  Surgeon: Stacey Mota MD;  Location: [facility] PAIN MGT;  Service: Pain Management;  Laterality: Right;    VASECTOMY      WISDOM TOOTH EXTRACTION        Social History     Socioeconomic History    Marital status:      Spouse name: Lakia    Number of children: 2   Occupational History    Occupation: superintendent     Comment:  [employer]     Employer: [employer]   Tobacco Use    Smoking status: Former     Current packs/day: 0.00     Average packs/day: 1 pack/day for 39.3 years (39.3 ttl pk-yrs)     Types: Cigarettes     Start date:      Quit date: 2023     Years since quittin.6     Passive exposure: Past    Smokeless tobacco: Never   Substance and Sexual Activity    Alcohol use: Yes     Alcohol/week: 0.0 standard drinks of alcohol     Comment: socially    Drug use: No  "   Sexual activity: Yes     Partners: Female   Social History Narrative    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      Medication Review:    Current Outpatient Medications:     aspirin (ECOTRIN) 81 MG EC tablet, Take 81 mg by mouth., Disp: , Rfl:     clopidogreL (PLAVIX) 75 mg tablet, Take 1 tablet (75 mg total) by mouth once daily., Disp: 90 tablet, Rfl: 3    finasteride (PROSCAR) 5 mg tablet, Take 1 tablet (5 mg total) by mouth once daily., Disp: 90 tablet, Rfl: 3    losartan (COZAAR) 25 MG tablet, Take 1 tablet (25 mg total) by mouth once daily., Disp: 90 tablet, Rfl: 3    clotrimazole (LOTRIMIN) 1 % cream, Apply topically 2 (two) times daily. (Patient not taking: Reported on 11/24/2023), Disp: 60 g, Rfl: 0    rosuvastatin (CRESTOR) 20 MG tablet, Take 1 tablet (20 mg total) by mouth once daily., Disp: 90 tablet, Rfl: 3    sildenafiL (VIAGRA) 100 MG tablet, Take 1 tablet (100 mg total) by mouth daily as needed for Erectile Dysfunction., Disp: 30 tablet, Rfl: 2    tamsulosin (FLOMAX) 0.4 mg Cap, Take 1 capsule (0.4 mg total) by mouth once daily. (Patient not taking: Reported on 11/24/2023), Disp: 90 capsule, Rfl: 3     Review of Systems       Objective:      Vitals:    11/24/23 1519   BP: 130/80   BP Location: Left arm   Patient Position: Sitting   BP Method: Medium (Manual)   Pulse: 82   SpO2: 98%   Weight: 91.2 kg (201 lb 1 oz)   Height: 5' 5" (1.651 m)      Physical Exam      Assessment:       Problem List Items Addressed This Visit          Cardiac/Vascular    Hypertension, essential    Hyperlipidemia    Relevant Medications    rosuvastatin (CRESTOR) 20 MG tablet    PAD (peripheral artery disease)    Relevant Orders    Ambulatory referral/consult to Hematology / Oncology       Renal/    Erectile dysfunction due to arterial insufficiency    Relevant Medications    sildenafiL (VIAGRA) 100 MG tablet    BPH with urinary obstruction       Endocrine    Obesity (BMI 30.0-34.9)     Other Visit Diagnoses       Encounter " for annual general medical examination with abnormal findings in adult    -  Primary    Encounter to establish care with new doctor                  Plan:       1. Encounter for annual general medical examination with abnormal findings in adult  Health maintenance updated  Chronic issues reviewed  Fasting labs previously completed      2. Encounter to establish care with new doctor  Reviewed chronic medical conditions, updated problem list and medication list      3. Other hyperlipidemia  Discussed r/b/a for crestor  Will reduce dose to 20mg today, plan to recheck in 6 months  If pt continues to have issues with joint or muscle pain, RTC so alternative meds can be explored  Sx described today sound more arthritic than pain from statin myopathy  -     rosuvastatin (CRESTOR) 20 MG tablet; Take 1 tablet (20 mg total) by mouth once daily.  Dispense: 90 tablet; Refill: 3    4. Hypertension, essential  Continue cozaar, condition is stable and controlled    5. Erectile dysfunction due to arterial insufficiency  Refill viagra today  -     sildenafiL (VIAGRA) 100 MG tablet; Take 1 tablet (100 mg total) by mouth daily as needed for Erectile Dysfunction.  Dispense: 30 tablet; Refill: 2    6. BPH with urinary obstruction  Continue flomax and proscar    7. PAD (peripheral artery disease)  Continue plavix  Placing referral for Heme/Onc (external referral as pt will be back in [place] next week), as pt has had clotting issues and continues to do so.  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  -stent Dr. Bay - 3/15/2022 - ILIAC, BILATERAL (Right VBX 7x59, Left omnilink 8x29)    Orders:  -     Ambulatory referral/consult to Hematology / Oncology; Future; Expected date: 12/01/2023    8. Obesity (BMI 30.0-34.9)  Counseled regarding benefits of  healthy diet (goal of 5 or more servings of fruits/veggies daily, water as main drink, increased consumption of healthy fats such as nuts, beans, avocados, olive oil instead of unhealthy fat options) and physical activity (150 minutes of moderate-intensity aerobic activity per week) to improve overall health.        Follow up in about 1 year (around 11/24/2024).     Jonathan Darnell MD, FAAFP  Family Medicine Physician  [facility]  11/27/2023

## 2023-12-28 ENCOUNTER — TELEPHONE (OUTPATIENT)
Dept: INTERNAL MEDICINE | Facility: CLINIC | Age: 58
End: 2023-12-28
Payer: COMMERCIAL

## 2023-12-28 DIAGNOSIS — E78.49 OTHER HYPERLIPIDEMIA: ICD-10-CM

## 2023-12-28 DIAGNOSIS — R59.0 MEDIASTINAL ADENOPATHY: Primary | ICD-10-CM

## 2023-12-28 DIAGNOSIS — I25.10 CORONARY ARTERY CALCIFICATION SEEN ON CT SCAN: ICD-10-CM

## 2023-12-28 DIAGNOSIS — Z87.891 PERSONAL HISTORY OF NICOTINE DEPENDENCE: ICD-10-CM

## 2023-12-28 NOTE — TELEPHONE ENCOUNTER
Called and spoke to pt. Reviewed results note from PCP, pt verbalized understanding. Will schedule Ct when it gets closer. Pt states he had to see Dr Darnell for his annual visit while he was in town from work in Feb. Pt states he intends to stay under the care of Dr Weber. Pt states the concern with the increased statin is that the 40mg dose caused terrible aches and pains. Please advise.

## 2023-12-28 NOTE — TELEPHONE ENCOUNTER
Please CALL patient and report that there were no new concerning areas on chest CT scan. Any previously reported abnormalities remain unchanged or stable. I recommend a repeat scan in 1 year to continue surveillance.     We did not see him for annual this year, there were some other old  findings as well (coronary). I would suggest higher dose of statin medicine and also possible cardiology consult     Please schedule follow up scan in 1 year - see orders. Thank you

## 2023-12-29 ENCOUNTER — PATIENT MESSAGE (OUTPATIENT)
Dept: INTERNAL MEDICINE | Facility: CLINIC | Age: 58
End: 2023-12-29
Payer: COMMERCIAL

## 2023-12-29 NOTE — TELEPHONE ENCOUNTER
Please call and relate - continue current dose of crestor, and lets refer to cardiology this year and have them take a look. There are some newer medicines that they may want to recommend.    Thank you.      Please see referral orders and please call patient to schedule a consult with cardiology. Thank you.

## 2023-12-29 NOTE — TELEPHONE ENCOUNTER
Called pt and relayed message from PCP, pt verbalized understanding. Pt scheduled with cardiology.

## 2024-01-29 ENCOUNTER — OFFICE VISIT (OUTPATIENT)
Dept: CARDIOLOGY | Facility: CLINIC | Age: 59
End: 2024-01-29
Attending: FAMILY MEDICINE
Payer: COMMERCIAL

## 2024-01-29 VITALS
WEIGHT: 205 LBS | BODY MASS INDEX: 34.16 KG/M2 | SYSTOLIC BLOOD PRESSURE: 100 MMHG | HEART RATE: 93 BPM | DIASTOLIC BLOOD PRESSURE: 80 MMHG | OXYGEN SATURATION: 98 % | HEIGHT: 65 IN

## 2024-01-29 DIAGNOSIS — I10 HYPERTENSION, ESSENTIAL: ICD-10-CM

## 2024-01-29 DIAGNOSIS — E66.9 OBESITY (BMI 30.0-34.9): ICD-10-CM

## 2024-01-29 DIAGNOSIS — I73.9 PAD (PERIPHERAL ARTERY DISEASE): Primary | ICD-10-CM

## 2024-01-29 DIAGNOSIS — E78.2 MIXED HYPERLIPIDEMIA: ICD-10-CM

## 2024-01-29 DIAGNOSIS — E78.49 OTHER HYPERLIPIDEMIA: ICD-10-CM

## 2024-01-29 DIAGNOSIS — I25.10 CORONARY ARTERY CALCIFICATION SEEN ON CT SCAN: ICD-10-CM

## 2024-01-29 DIAGNOSIS — I74.5 OCCLUSION OF RIGHT ILIAC ARTERY: ICD-10-CM

## 2024-01-29 DIAGNOSIS — G47.33 OSA (OBSTRUCTIVE SLEEP APNEA): ICD-10-CM

## 2024-01-29 PROCEDURE — 3079F DIAST BP 80-89 MM HG: CPT | Mod: CPTII,S$GLB,, | Performed by: INTERNAL MEDICINE

## 2024-01-29 PROCEDURE — 3074F SYST BP LT 130 MM HG: CPT | Mod: CPTII,S$GLB,, | Performed by: INTERNAL MEDICINE

## 2024-01-29 PROCEDURE — 3008F BODY MASS INDEX DOCD: CPT | Mod: CPTII,S$GLB,, | Performed by: INTERNAL MEDICINE

## 2024-01-29 PROCEDURE — 1159F MED LIST DOCD IN RCRD: CPT | Mod: CPTII,S$GLB,, | Performed by: INTERNAL MEDICINE

## 2024-01-29 PROCEDURE — 99999 PR PBB SHADOW E&M-EST. PATIENT-LVL V: CPT | Mod: PBBFAC,,, | Performed by: INTERNAL MEDICINE

## 2024-01-29 PROCEDURE — 99204 OFFICE O/P NEW MOD 45 MIN: CPT | Mod: S$GLB,,, | Performed by: INTERNAL MEDICINE

## 2024-01-29 RX ORDER — ROSUVASTATIN CALCIUM 40 MG/1
40 TABLET, COATED ORAL DAILY
Qty: 90 TABLET | Refills: 3 | Status: SHIPPED | OUTPATIENT
Start: 2024-01-29 | End: 2024-04-29

## 2024-01-29 NOTE — PROGRESS NOTES
Subjective:    Patient ID:  Nanda Colin III is a 58 y.o. male who presents for evaluation of     HPI  The  patient is a 58 year old male followed by Dr Weber with PAD post PTCA [ Dr Bay], coronary calcifations, hyperlipidemia and hypertension. He is post repete PTCA in November in [facility]. He now pain free. 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 He has not history od exertional chest pain. He has stopped smoking 1 year ago. He reports myalgia with high dose rosuvastatin.       RADIOGRAPHIC FINDINGS: 11/17/23    Total occlusion of the entire right common iliac artery stent extending   from the proximal portion of the stent in the aorta down to the distal   portion just proximal to the takeoff of the right hypogastric artery.     Lab Results   Component Value Date     11/24/2023    K 4.6 11/24/2023     11/24/2023    CO2 24 11/24/2023    BUN 13 11/24/2023    CREATININE 0.8 11/24/2023     11/24/2023    HGBA1C 5.5 11/24/2023    MG 2.2 06/25/2016    AST 18 11/24/2023    ALT 21 11/24/2023    ALBUMIN 3.6 11/24/2023    PROT 6.9 11/24/2023    BILITOT 1.2 (H) 11/24/2023    WBC 6.44 11/24/2023    HGB 15.5 11/24/2023    HCT 46.3 11/24/2023    MCV 83 11/24/2023     11/24/2023    PSA 0.95 11/24/2023    TSH 2.258 07/14/2020         Lab Results   Component Value Date    CHOL 156 11/24/2023    HDL 31 (L) 11/24/2023    TRIG 154 (H) 11/24/2023       Lab Results   Component Value Date    LDLCALC 94.2 11/24/2023       Past Medical History:   Diagnosis Date    BPH (benign prostatic hypertrophy)     Family history of prostate cancer 8/5/2021    brother    Hernia     Hyperlipidemia     Hypertension, essential 12/30/2015    Incarcerated umbilical hernia 6/25/2016    Umbilical hernia        Current Outpatient Medications:     aspirin (ECOTRIN) 81 MG EC tablet, Take 81 mg by mouth., Disp: , Rfl:     clotrimazole (LOTRIMIN) 1 % cream, Apply topically 2 (two) times daily., Disp: 60  g, Rfl: 0    finasteride (PROSCAR) 5 mg tablet, Take 1 tablet (5 mg total) by mouth once daily., Disp: 90 tablet, Rfl: 3    losartan (COZAAR) 25 MG tablet, Take 1 tablet (25 mg total) by mouth once daily., Disp: 90 tablet, Rfl: 3    rosuvastatin (CRESTOR) 20 MG tablet, Take 1 tablet (20 mg total) by mouth once daily., Disp: 90 tablet, Rfl: 3    sildenafiL (VIAGRA) 100 MG tablet, Take 1 tablet (100 mg total) by mouth daily as needed for Erectile Dysfunction., Disp: 30 tablet, Rfl: 2    clopidogreL (PLAVIX) 75 mg tablet, Take 1 tablet (75 mg total) by mouth once daily. (Patient not taking: Reported on 1/29/2024), Disp: 90 tablet, Rfl: 3    tamsulosin (FLOMAX) 0.4 mg Cap, Take 1 capsule (0.4 mg total) by mouth once daily. (Patient not taking: Reported on 1/29/2024), Disp: 90 capsule, Rfl: 3          Review of Systems   Constitutional: Negative for decreased appetite, diaphoresis, fever, malaise/fatigue, weight gain and weight loss.   HENT:  Negative for congestion, ear discharge, ear pain and nosebleeds.    Eyes:  Negative for blurred vision, double vision and visual disturbance.   Cardiovascular:  Negative for chest pain, claudication, cyanosis, dyspnea on exertion, irregular heartbeat, leg swelling, near-syncope, orthopnea, palpitations, paroxysmal nocturnal dyspnea and syncope.   Respiratory:  Negative for cough, hemoptysis, shortness of breath, sleep disturbances due to breathing, snoring, sputum production and wheezing.    Endocrine: Negative for polydipsia, polyphagia and polyuria.   Hematologic/Lymphatic: Negative for adenopathy and bleeding problem. Does not bruise/bleed easily.   Skin:  Negative for color change, nail changes, poor wound healing and rash.   Musculoskeletal:  Negative for muscle cramps and muscle weakness.   Gastrointestinal:  Negative for abdominal pain, anorexia, change in bowel habit, hematochezia, nausea and vomiting.   Genitourinary:  Negative for dysuria, frequency and hematuria.  "  Neurological:  Negative for brief paralysis, difficulty with concentration, excessive daytime sleepiness, dizziness, focal weakness, headaches, light-headedness, seizures, vertigo and weakness.   Psychiatric/Behavioral:  Negative for altered mental status and depression.    Allergic/Immunologic: Negative for persistent infections.        Objective:/80   Pulse 93   Ht 5' 5" (1.651 m)   Wt 93 kg (205 lb 0.4 oz)   SpO2 98%   BMI 34.12 kg/m²             Physical Exam  Constitutional:       Appearance: He is well-developed. He is obese.   HENT:      Head: Normocephalic.      Right Ear: External ear normal.      Left Ear: External ear normal.      Nose: Nose normal.   Eyes:      General: No scleral icterus.     Pupils: Pupils are equal, round, and reactive to light.   Neck:      Thyroid: No thyromegaly.      Vascular: No JVD.      Trachea: No tracheal deviation.   Cardiovascular:      Rate and Rhythm: Normal rate and regular rhythm.      Pulses: Intact distal pulses.           Carotid pulses are 2+ on the right side and 2+ on the left side.       Dorsalis pedis pulses are 2+ on the right side and 2+ on the left side.        Posterior tibial pulses are 0 on the right side and 0 on the left side.      Heart sounds: No murmur heard.     No friction rub. No gallop.   Pulmonary:      Effort: Pulmonary effort is normal.      Breath sounds: Normal breath sounds.   Abdominal:      General: Bowel sounds are normal. There is no distension.      Tenderness: There is no abdominal tenderness. There is no guarding.   Musculoskeletal:         General: No tenderness. Normal range of motion.      Cervical back: Normal range of motion and neck supple.   Lymphadenopathy:      Comments: Palpation of neck and groin lymph nodes normal   Skin:     General: Skin is dry.      Comments: Palpation of skin normal   Neurological:      Mental Status: He is alert and oriented to person, place, and time.      Cranial Nerves: No cranial " nerve deficit.      Motor: No abnormal muscle tone.      Coordination: Coordination normal.   Psychiatric:         Behavior: Behavior normal.         Thought Content: Thought content normal.         Judgment: Judgment normal.           Assessment:       1. PAD (peripheral artery disease)    2. Occlusion of right iliac artery    3. Hypertension, essential    4. Other hyperlipidemia    5. Coronary artery calcification seen on CT scan    6. Obesity (BMI 30.0-34.9)    7. GINNA (obstructive sleep apnea)         Plan:       Nanda was seen today for coronary artery disease.    Diagnoses and all orders for this visit:    PAD (peripheral artery disease)    Occlusion of right iliac artery    Hypertension, essential    Other hyperlipidemia  -     Ambulatory referral/consult to Cardiology    Coronary artery calcification seen on CT scan  -     Ambulatory referral/consult to Cardiology    Obesity (BMI 30.0-34.9)    GINNA (obstructive sleep apnea)

## 2024-03-12 ENCOUNTER — PATIENT MESSAGE (OUTPATIENT)
Dept: INTERNAL MEDICINE | Facility: CLINIC | Age: 59
End: 2024-03-12
Payer: COMMERCIAL

## 2024-03-12 DIAGNOSIS — E78.5 HYPERLIPIDEMIA, UNSPECIFIED HYPERLIPIDEMIA TYPE: ICD-10-CM

## 2024-03-12 DIAGNOSIS — Z00.00 ANNUAL PHYSICAL EXAM: Primary | ICD-10-CM

## 2024-03-12 DIAGNOSIS — Z12.5 PROSTATE CANCER SCREENING: ICD-10-CM

## 2024-03-12 DIAGNOSIS — I10 HYPERTENSION, ESSENTIAL: ICD-10-CM

## 2024-03-13 NOTE — TELEPHONE ENCOUNTER
Patient has an existing appointment - See lab orders and please call patient to schedule labs before appointment in november. Thank you

## 2024-04-29 DIAGNOSIS — E78.49 OTHER HYPERLIPIDEMIA: ICD-10-CM

## 2024-04-29 RX ORDER — ROSUVASTATIN CALCIUM 40 MG/1
40 TABLET, COATED ORAL
Qty: 90 TABLET | Refills: 3 | Status: SHIPPED | OUTPATIENT
Start: 2024-04-29

## 2024-05-20 ENCOUNTER — PATIENT MESSAGE (OUTPATIENT)
Dept: UROLOGY | Facility: CLINIC | Age: 59
End: 2024-05-20
Payer: COMMERCIAL

## 2024-06-06 DIAGNOSIS — I73.9 PAD (PERIPHERAL ARTERY DISEASE): Primary | ICD-10-CM

## 2024-06-10 ENCOUNTER — HOSPITAL ENCOUNTER (OUTPATIENT)
Dept: VASCULAR SURGERY | Facility: CLINIC | Age: 59
Discharge: HOME | End: 2024-06-10
Attending: SURGERY
Payer: COMMERCIAL

## 2024-06-10 ENCOUNTER — OFFICE VISIT (OUTPATIENT)
Dept: VASCULAR SURGERY | Facility: CLINIC | Age: 59
End: 2024-06-10
Payer: COMMERCIAL

## 2024-06-10 VITALS
WEIGHT: 196.19 LBS | HEIGHT: 65 IN | TEMPERATURE: 98 F | BODY MASS INDEX: 32.69 KG/M2 | DIASTOLIC BLOOD PRESSURE: 58 MMHG | SYSTOLIC BLOOD PRESSURE: 107 MMHG | HEART RATE: 71 BPM

## 2024-06-10 DIAGNOSIS — I73.9 PAD (PERIPHERAL ARTERY DISEASE): ICD-10-CM

## 2024-06-10 DIAGNOSIS — I73.9 PAD (PERIPHERAL ARTERY DISEASE): Primary | ICD-10-CM

## 2024-06-10 PROCEDURE — 99214 OFFICE O/P EST MOD 30 MIN: CPT | Mod: S$GLB,,, | Performed by: SURGERY

## 2024-06-10 PROCEDURE — 3074F SYST BP LT 130 MM HG: CPT | Mod: CPTII,S$GLB,, | Performed by: SURGERY

## 2024-06-10 PROCEDURE — 3078F DIAST BP <80 MM HG: CPT | Mod: CPTII,S$GLB,, | Performed by: SURGERY

## 2024-06-10 PROCEDURE — 99999 PR PBB SHADOW E&M-EST. PATIENT-LVL III: CPT | Mod: PBBFAC,,, | Performed by: SURGERY

## 2024-06-10 PROCEDURE — 4010F ACE/ARB THERAPY RXD/TAKEN: CPT | Mod: CPTII,S$GLB,, | Performed by: SURGERY

## 2024-06-10 PROCEDURE — 1159F MED LIST DOCD IN RCRD: CPT | Mod: CPTII,S$GLB,, | Performed by: SURGERY

## 2024-06-10 PROCEDURE — 3008F BODY MASS INDEX DOCD: CPT | Mod: CPTII,S$GLB,, | Performed by: SURGERY

## 2024-06-10 PROCEDURE — 93923 UPR/LXTR ART STDY 3+ LVLS: CPT | Mod: S$GLB,,, | Performed by: SURGERY

## 2024-06-10 RX ORDER — RIVAROXABAN 2.5 MG/1
1 TABLET, FILM COATED ORAL 2 TIMES DAILY
COMMUNITY

## 2024-06-10 RX ORDER — SILODOSIN 8 MG/1
8 CAPSULE ORAL
COMMUNITY
Start: 2024-05-29

## 2024-06-10 NOTE — PROGRESS NOTES
History & Physical    SUBJECTIVE:     History of Present Illness:  Patient is a 58 y.o. male w/ PMH of HTN, HLD, obesity, and PAD s/p bilateral iliac stenting x3 presents with worsening right leg claudication with walking.  Recently while working in [place] was evaluated by a vascular surgeon and underwent angiogram with bilateral common iliac stent placements on 11/17/2023.  Following this appointment his ABIs were 1 in bilateral legs, and said his symptoms were greatly improved.  However in the past 5 weeks he has noticed a significant increase in the claudication on his right leg to the point where he can only walk less than 100 ft.  Repeat ABIs today show RLE REGLA of 0.54 and LLE REGLA of 1.09.    Was recently switched to aspirin, Xarelto, and statin.      Review of patient's allergies indicates:   Allergen Reactions    Adhesive Hives and Blisters     TEGADERM - Transparent Film       Current Outpatient Medications   Medication Sig Dispense Refill    aspirin (ECOTRIN) 81 MG EC tablet Take 81 mg by mouth.      losartan (COZAAR) 25 MG tablet Take 1 tablet (25 mg total) by mouth once daily. 90 tablet 3    sildenafiL (VIAGRA) 100 MG tablet Take 1 tablet (100 mg total) by mouth daily as needed for Erectile Dysfunction. 30 tablet 2    silodosin (RAPAFLO) 8 mg Cap capsule Take 8 mg by mouth.      XARELTO 2.5 mg Tab Take 1 tablet by mouth 2 (two) times daily.      finasteride (PROSCAR) 5 mg tablet Take 1 tablet (5 mg total) by mouth once daily. 90 tablet 3    rosuvastatin (CRESTOR) 40 MG Tab TAKE 1 TABLET BY MOUTH EVERY DAY 90 tablet 3     No current facility-administered medications for this visit.       Past Medical History:   Diagnosis Date    BPH (benign prostatic hypertrophy)     Family history of prostate cancer 8/5/2021    brother    Hernia     Hyperlipidemia     Hypertension, essential 12/30/2015    Incarcerated umbilical hernia 6/25/2016    Umbilical hernia      Past Surgical History:   Procedure  Laterality Date    ANGIOGRAPHY OF LOWER EXTREMITY Bilateral 2022    Procedure: Angiogram Extremity Unilateral;  Surgeon: Ruperto Bay MD;  Location: [facility] OR 2ND FLR;  Service: Vascular;  Laterality: Bilateral;  3.7 min  1067.83 mGy  125.26 Gy.cm  104ml Dye    AORTOGRAPHY N/A 2022    Procedure: AORTOGRAM;  Surgeon: Ruperto Bay MD;  Location: [facility] OR [facility] FLR;  Service: Vascular;  Laterality: N/A;    AORTOGRAPHY WITH SERIALOGRAPHY N/A 3/15/2022    Procedure: AORTOGRAM, WITH SERIALOGRAPHY;  Surgeon: Ruperto Bay MD;  Location: [facility] OR [facility] FLR;  Service: Vascular;  Laterality: N/A;  aortogram w/ runoff & PTA,    COLONOSCOPY N/A 2019    Procedure: COLONOSCOPY;  Surgeon: Fritz Franco MD;  Location: [facility] ENDO (4TH FLR);  Service: Endoscopy;  Laterality: N/A;  Do not cancel this order    COLONOSCOPY N/A 2023    Procedure: COLONOSCOPY;  Surgeon: Ja Alexander MD;  Location: [facility] ENDO (4TH FLR);  Service: Endoscopy;  Laterality: N/A;  inst portal-any md-tb-ok to hold plavix see te 22  Precall done -AA    HERNIA REPAIR      TRANSFORAMINAL EPIDURAL INJECTION OF STEROID Right 12/15/2021    Procedure: Injection,steroid,epidural,transforaminal approach Right L4 & L5 (May change depending MRI);  Surgeon: Stacey Mota MD;  Location: [facility] PAIN T;  Service: Pain Management;  Laterality: Right;    VASECTOMY      WISDOM TOOTH EXTRACTION       Family History   Problem Relation Name Age of Onset    Stroke Father      Heart disease Paternal Uncle      Kidney disease Paternal Uncle      Depression Paternal Uncle      Cancer Maternal Grandfather      COPD Maternal Grandfather      Cancer Paternal Grandmother      COPD Paternal Aunt       Social History     Tobacco Use    Smoking status: Former     Current packs/day: 0.00     Average packs/day: 1 pack/day for 39.3 years (39.3 ttl pk-yrs)     Types: Cigarettes     Start date:      Quit date: 2023     Years since quittin.1     Passive  "exposure: Past    Smokeless tobacco: Never   Substance Use Topics    Alcohol use: Yes     Alcohol/week: 0.0 standard drinks of alcohol     Comment: socially    Drug use: No        Review of Systems:  ROS: a comprehensive review of systems was done that was negative unless otherwise stated in the HPI  OBJECTIVE:     Vital Signs (Most Recent)  Temp: 98.1 °F (36.7 °C) (06/10/24 1330)  Pulse: 71 (06/10/24 1330)  BP: (!) 107/58 (06/10/24 1330)  5' 5" (1.651 m)  89 kg (196 lb 3.4 oz)     Physical Exam:  Physical Exam  Constitutional:       General: He is not in acute distress.     Appearance: Normal appearance. He is obese. He is not ill-appearing.   Cardiovascular:      Rate and Rhythm: Normal rate and regular rhythm.      Comments: 2+ L DP and PT  Not palpable R DP or PT  1+ R femoral pulse, 2+ L femoral pulse  Pulmonary:      Effort: Pulmonary effort is normal.   Abdominal:      General: Abdomen is flat.      Palpations: Abdomen is soft.      Comments: Well healed infraumbilical scar   Musculoskeletal:         General: Normal range of motion.   Skin:     General: Skin is warm and dry.      Comments: No evidence of ulcers or wounds   Neurological:      Mental Status: He is alert.       ASSESSMENT/PLAN:     58 y.o. male w/ PMH of HTN, HLD, obesity, and PAD s/p bilateral iliac stenting x3 presents with worsening right leg claudication with walking.  His right iliac stent is most likely down.  Given his history of multiple stentings with failure, would require an open operation with aortobifem bypass.    PLAN:    - Dobutamine stress test ordered  - CTA chest abdomen pelvis ordered to assess anatomy for surgical planning  -Fu in clinic after imaging/stress test    Corbin Jorge MD  General Surgery  6/10/2024      Corbin Jorge MD  General Surgery  6/10/2024    "

## 2024-06-12 ENCOUNTER — HOSPITAL ENCOUNTER (OUTPATIENT)
Dept: RADIOLOGY | Facility: HOSPITAL | Age: 59
Discharge: HOME | End: 2024-06-12
Payer: COMMERCIAL

## 2024-06-12 ENCOUNTER — TELEPHONE (OUTPATIENT)
Dept: CARDIOLOGY | Facility: HOSPITAL | Age: 59
End: 2024-06-12
Payer: COMMERCIAL

## 2024-06-12 DIAGNOSIS — I73.9 PAD (PERIPHERAL ARTERY DISEASE): ICD-10-CM

## 2024-06-12 PROCEDURE — 74174 CTA ABD&PLVS W/CONTRAST: CPT | Mod: TC

## 2024-06-12 PROCEDURE — 25500020 PHARM REV CODE 255

## 2024-06-12 PROCEDURE — 71275 CT ANGIOGRAPHY CHEST: CPT | Mod: 26,,, | Performed by: RADIOLOGY

## 2024-06-12 PROCEDURE — 74174 CTA ABD&PLVS W/CONTRAST: CPT | Mod: 26,,, | Performed by: RADIOLOGY

## 2024-06-12 RX ADMIN — IOHEXOL 100 ML: 350 INJECTION, SOLUTION INTRAVENOUS at 01:06

## 2024-06-13 LAB
CREAT SERPL-MCNC: 0.9 MG/DL (ref 0.5–1.4)
SAMPLE: NORMAL

## 2024-06-14 ENCOUNTER — HOSPITAL ENCOUNTER (OUTPATIENT)
Dept: CARDIOLOGY | Facility: HOSPITAL | Age: 59
Discharge: HOME | End: 2024-06-14
Payer: COMMERCIAL

## 2024-06-14 VITALS
HEIGHT: 65 IN | HEART RATE: 77 BPM | SYSTOLIC BLOOD PRESSURE: 169 MMHG | WEIGHT: 196 LBS | DIASTOLIC BLOOD PRESSURE: 109 MMHG | BODY MASS INDEX: 32.65 KG/M2

## 2024-06-14 DIAGNOSIS — I73.9 PAD (PERIPHERAL ARTERY DISEASE): ICD-10-CM

## 2024-06-14 LAB
CV PHARM DOSE: 0.4 MG
CV STRESS BASE HR: 57 BPM
DIASTOLIC BLOOD PRESSURE: 103 MMHG
EJECTION FRACTION- HIGH: 59 %
END DIASTOLIC INDEX-HIGH: 155 ML/M2
END DIASTOLIC INDEX-LOW: 91 ML/M2
END SYSTOLIC INDEX-HIGH: 78 ML/M2
END SYSTOLIC INDEX-LOW: 40 ML/M2
NUC REST DIASTOLIC VOLUME INDEX: 62
NUC REST EJECTION FRACTION: 68
NUC REST SYSTOLIC VOLUME INDEX: 20
NUC STRESS DIASTOLIC VOLUME INDEX: 61
NUC STRESS EJECTION FRACTION: 61 %
NUC STRESS SYSTOLIC VOLUME INDEX: 24
OHS CV CPX 1 MINUTE RECOVERY HEART RATE: 81 BPM
OHS CV CPX 85 PERCENT MAX PREDICTED HEART RATE MALE: 136
OHS CV CPX MAX PREDICTED HEART RATE: 160
OHS CV CPX PATIENT IS FEMALE: 0
OHS CV CPX PATIENT IS MALE: 1
OHS CV CPX PEAK DIASTOLIC BLOOD PRESSURE: 92 MMHG
OHS CV CPX PEAK HEAR RATE: 72 BPM
OHS CV CPX PEAK RATE PRESSURE PRODUCT: NORMAL
OHS CV CPX PEAK SYSTOLIC BLOOD PRESSURE: 188 MMHG
OHS CV CPX PERCENT MAX PREDICTED HEART RATE ACHIEVED: 45
OHS CV CPX RATE PRESSURE PRODUCT PRESENTING: NORMAL
RETIRED EF AND QEF - SEE NOTES: 47 %
SYSTOLIC BLOOD PRESSURE: 183 MMHG

## 2024-06-14 PROCEDURE — 93016 CV STRESS TEST SUPVJ ONLY: CPT | Mod: ,,, | Performed by: INTERNAL MEDICINE

## 2024-06-14 PROCEDURE — A9502 TC99M TETROFOSMIN: HCPCS

## 2024-06-14 PROCEDURE — 63600175 PHARM REV CODE 636 W HCPCS

## 2024-06-14 PROCEDURE — 93017 CV STRESS TEST TRACING ONLY: CPT

## 2024-06-14 PROCEDURE — 93018 CV STRESS TEST I&R ONLY: CPT | Mod: ,,, | Performed by: INTERNAL MEDICINE

## 2024-06-14 PROCEDURE — 78452 HT MUSCLE IMAGE SPECT MULT: CPT | Mod: 26,,, | Performed by: INTERNAL MEDICINE

## 2024-06-14 RX ORDER — REGADENOSON 0.08 MG/ML
0.4 INJECTION, SOLUTION INTRAVENOUS
Status: COMPLETED | OUTPATIENT
Start: 2024-06-14 | End: 2024-06-14

## 2024-06-14 RX ADMIN — TETROFOSMIN 30.4 MILLICURIE: 1.38 INJECTION, POWDER, LYOPHILIZED, FOR SOLUTION INTRAVENOUS at 08:06

## 2024-06-14 RX ADMIN — REGADENOSON 0.4 MG: 0.08 INJECTION, SOLUTION INTRAVENOUS at 08:06

## 2024-06-14 RX ADMIN — TETROFOSMIN 10.4 MILLICURIE: 1.38 INJECTION, POWDER, LYOPHILIZED, FOR SOLUTION INTRAVENOUS at 07:06

## 2024-06-17 ENCOUNTER — OFFICE VISIT (OUTPATIENT)
Dept: VASCULAR SURGERY | Facility: CLINIC | Age: 59
End: 2024-06-17
Payer: COMMERCIAL

## 2024-06-17 ENCOUNTER — LAB VISIT (OUTPATIENT)
Dept: LAB | Facility: HOSPITAL | Age: 59
End: 2024-06-17
Attending: SURGERY
Payer: COMMERCIAL

## 2024-06-17 ENCOUNTER — HOSPITAL ENCOUNTER (OUTPATIENT)
Dept: CARDIOLOGY | Facility: CLINIC | Age: 59
Discharge: HOME | End: 2024-06-17
Payer: COMMERCIAL

## 2024-06-17 VITALS
SYSTOLIC BLOOD PRESSURE: 143 MMHG | HEART RATE: 75 BPM | BODY MASS INDEX: 33.43 KG/M2 | TEMPERATURE: 98 F | WEIGHT: 200.63 LBS | HEIGHT: 65 IN | DIASTOLIC BLOOD PRESSURE: 86 MMHG

## 2024-06-17 DIAGNOSIS — I73.9 PAD (PERIPHERAL ARTERY DISEASE): Primary | ICD-10-CM

## 2024-06-17 DIAGNOSIS — Z01.818 PREOP EXAMINATION: Primary | ICD-10-CM

## 2024-06-17 DIAGNOSIS — I74.5 OCCLUSION OF RIGHT ILIAC ARTERY: ICD-10-CM

## 2024-06-17 DIAGNOSIS — Z01.818 PREOP EXAMINATION: ICD-10-CM

## 2024-06-17 LAB
ALBUMIN SERPL BCP-MCNC: 3.9 G/DL (ref 3.5–5.2)
ALP SERPL-CCNC: 70 U/L (ref 55–135)
ALT SERPL W/O P-5'-P-CCNC: 22 U/L (ref 10–44)
ANION GAP SERPL CALC-SCNC: 7 MMOL/L (ref 8–16)
AST SERPL-CCNC: 17 U/L (ref 10–40)
BASOPHILS # BLD AUTO: 0.07 K/UL (ref 0–0.2)
BASOPHILS NFR BLD: 0.8 % (ref 0–1.9)
BILIRUB SERPL-MCNC: 0.8 MG/DL (ref 0.1–1)
BUN SERPL-MCNC: 9 MG/DL (ref 8–23)
CALCIUM SERPL-MCNC: 9.5 MG/DL (ref 8.7–10.5)
CHLORIDE SERPL-SCNC: 108 MMOL/L (ref 95–110)
CO2 SERPL-SCNC: 24 MMOL/L (ref 23–29)
CREAT SERPL-MCNC: 0.8 MG/DL (ref 0.5–1.4)
DIFFERENTIAL METHOD BLD: NORMAL
EOSINOPHIL # BLD AUTO: 0.2 K/UL (ref 0–0.5)
EOSINOPHIL NFR BLD: 2.1 % (ref 0–8)
ERYTHROCYTE [DISTWIDTH] IN BLOOD BY AUTOMATED COUNT: 14 % (ref 11.5–14.5)
EST. GFR  (NO RACE VARIABLE): >60 ML/MIN/1.73 M^2
GLUCOSE SERPL-MCNC: 92 MG/DL (ref 70–110)
HCT VFR BLD AUTO: 45 % (ref 40–54)
HGB BLD-MCNC: 15.3 G/DL (ref 14–18)
IMM GRANULOCYTES # BLD AUTO: 0.03 K/UL (ref 0–0.04)
IMM GRANULOCYTES NFR BLD AUTO: 0.4 % (ref 0–0.5)
LYMPHOCYTES # BLD AUTO: 2.9 K/UL (ref 1–4.8)
LYMPHOCYTES NFR BLD: 34.4 % (ref 18–48)
MCH RBC QN AUTO: 28.8 PG (ref 27–31)
MCHC RBC AUTO-ENTMCNC: 34 G/DL (ref 32–36)
MCV RBC AUTO: 85 FL (ref 82–98)
MONOCYTES # BLD AUTO: 0.7 K/UL (ref 0.3–1)
MONOCYTES NFR BLD: 7.7 % (ref 4–15)
NEUTROPHILS # BLD AUTO: 4.6 K/UL (ref 1.8–7.7)
NEUTROPHILS NFR BLD: 54.6 % (ref 38–73)
NRBC BLD-RTO: 0 /100 WBC
OHS QRS DURATION: 80 MS
OHS QTC CALCULATION: 406 MS
PLATELET # BLD AUTO: 272 K/UL (ref 150–450)
PMV BLD AUTO: 10.8 FL (ref 9.2–12.9)
POTASSIUM SERPL-SCNC: 4 MMOL/L (ref 3.5–5.1)
PROT SERPL-MCNC: 6.8 G/DL (ref 6–8.4)
RBC # BLD AUTO: 5.32 M/UL (ref 4.6–6.2)
SODIUM SERPL-SCNC: 139 MMOL/L (ref 136–145)
WBC # BLD AUTO: 8.42 K/UL (ref 3.9–12.7)

## 2024-06-17 PROCEDURE — 3008F BODY MASS INDEX DOCD: CPT | Mod: CPTII,S$GLB,, | Performed by: SURGERY

## 2024-06-17 PROCEDURE — 3079F DIAST BP 80-89 MM HG: CPT | Mod: CPTII,S$GLB,, | Performed by: SURGERY

## 2024-06-17 PROCEDURE — 99213 OFFICE O/P EST LOW 20 MIN: CPT | Mod: S$GLB,,, | Performed by: SURGERY

## 2024-06-17 PROCEDURE — 85025 COMPLETE CBC W/AUTO DIFF WBC: CPT | Performed by: SURGERY

## 2024-06-17 PROCEDURE — 4010F ACE/ARB THERAPY RXD/TAKEN: CPT | Mod: CPTII,S$GLB,, | Performed by: SURGERY

## 2024-06-17 PROCEDURE — 99999 PR PBB SHADOW E&M-EST. PATIENT-LVL III: CPT | Mod: PBBFAC,,, | Performed by: SURGERY

## 2024-06-17 PROCEDURE — 80053 COMPREHEN METABOLIC PANEL: CPT | Performed by: SURGERY

## 2024-06-17 PROCEDURE — 93010 ELECTROCARDIOGRAM REPORT: CPT | Mod: S$GLB,,, | Performed by: INTERNAL MEDICINE

## 2024-06-17 PROCEDURE — 36415 COLL VENOUS BLD VENIPUNCTURE: CPT | Performed by: SURGERY

## 2024-06-17 PROCEDURE — 93005 ELECTROCARDIOGRAM TRACING: CPT | Mod: S$GLB,,, | Performed by: SURGERY

## 2024-06-17 PROCEDURE — 3077F SYST BP >= 140 MM HG: CPT | Mod: CPTII,S$GLB,, | Performed by: SURGERY

## 2024-06-17 PROCEDURE — 1159F MED LIST DOCD IN RCRD: CPT | Mod: CPTII,S$GLB,, | Performed by: SURGERY

## 2024-06-17 RX ORDER — CEFAZOLIN SODIUM 2 G/50ML
2 SOLUTION INTRAVENOUS
OUTPATIENT
Start: 2024-06-17

## 2024-06-17 RX ORDER — CLOPIDOGREL BISULFATE 75 MG/1
75 TABLET ORAL DAILY
Qty: 90 TABLET | Refills: 3 | Status: SHIPPED | OUTPATIENT
Start: 2024-06-17 | End: 2025-06-17

## 2024-06-17 RX ORDER — SODIUM CHLORIDE 9 MG/ML
INJECTION, SOLUTION INTRAVENOUS CONTINUOUS
OUTPATIENT
Start: 2024-06-17

## 2024-06-17 NOTE — PROGRESS NOTES
History & Physical    SUBJECTIVE:     History of Present Illness:  Patient is a 59 y.o. male w/ PMH of HTN, HLD, obesity, and PAD s/p bilateral iliac stenting x3 presents with worsening right leg claudication with walking.  Recently while working in [place] was evaluated by a vascular surgeon and underwent angiogram with bilateral common iliac stent placements on 11/17/2023.  Following this appointment his ABIs were 1 in bilateral legs, and said his symptoms were greatly improved.  However in the past 5 weeks he has noticed a significant increase in the claudication on his right leg to the point where he can only walk less than 100 ft.  Repeat ABIs today show RLE REGLA of 0.54 and LLE REGLA of 1.09.    Was recently switched to aspirin, Xarelto, and statin.  Was previously on Plavix, however his medications were adjusted since his most recent stent placement.    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      Review of patient's allergies indicates:   Allergen Reactions    Adhesive Hives and Blisters     TEGADERM - Transparent Film       Current Outpatient Medications   Medication Sig Dispense Refill    aspirin (ECOTRIN) 81 MG EC tablet Take 81 mg by mouth.      losartan (COZAAR) 25 MG tablet Take 1 tablet (25 mg total) by mouth once daily. 90 tablet 3    sildenafiL (VIAGRA) 100 MG tablet Take 1 tablet (100 mg total) by mouth daily as needed for Erectile Dysfunction. 30 tablet 2    silodosin (RAPAFLO) 8 mg Cap capsule Take 8 mg by mouth.      XARELTO 2.5 mg Tab Take 1 tablet by mouth 2 (two) times daily.      finasteride (PROSCAR) 5 mg tablet Take 1 tablet (5 mg total) by mouth once daily. 90 tablet 3    rosuvastatin (CRESTOR)  40 MG Tab TAKE 1 TABLET BY MOUTH EVERY DAY 90 tablet 3     No current facility-administered medications for this visit.       Past Medical History:   Diagnosis Date    BPH (benign prostatic hypertrophy)     Family history of prostate cancer 8/5/2021    brother    Hernia     Hyperlipidemia     Hypertension, essential 12/30/2015    Incarcerated umbilical hernia 6/25/2016    Umbilical hernia      Past Surgical History:   Procedure Laterality Date    ANGIOGRAPHY OF LOWER EXTREMITY Bilateral 11/9/2022    Procedure: Angiogram Extremity Unilateral;  Surgeon: Ruperto Bay MD;  Location: [facility] OR [facility] FLR;  Service: Vascular;  Laterality: Bilateral;  3.7 min  1067.83 mGy  125.26 Gy.cm  104ml Dye    AORTOGRAPHY N/A 11/9/2022    Procedure: AORTOGRAM;  Surgeon: Ruperto Bay MD;  Location: [facility] OR [facility] FLR;  Service: Vascular;  Laterality: N/A;    AORTOGRAPHY WITH SERIALOGRAPHY N/A 3/15/2022    Procedure: AORTOGRAM, WITH SERIALOGRAPHY;  Surgeon: Ruperto Bay MD;  [facility];  Service: Vascular;  Laterality: N/A;  aortogram w/ runoff & PTA,    COLONOSCOPY N/A 1/14/2019    Procedure: COLONOSCOPY;  Surgeon: Fritz Franco MD;  Location: [facility] ENDO (4TH FLR);  Service: Endoscopy;  Laterality: N/A;  Do not cancel this order    COLONOSCOPY N/A 2/13/2023    Procedure: COLONOSCOPY;  Surgeon: Ja Alexander MD;  Location: [facility] ENDO (4TH FLR);  Service: Endoscopy;  Laterality: N/A;  inst portal-any md-tb-ok to hold plavix see te 11/18/22  Precall done 2/6-AA    HERNIA REPAIR      TRANSFORAMINAL EPIDURAL INJECTION OF STEROID Right 12/15/2021    Procedure: Injection,steroid,epidural,transforaminal approach Right L4 & L5 (May change depending MRI);  Surgeon: Stacey Mota MD;  Location: [facility] PAIN MGT;  Service: Pain Management;  Laterality: Right;    VASECTOMY      WISDOM TOOTH EXTRACTION       Family History   Problem Relation Name Age of Onset    Stroke Father      Heart disease Paternal Uncle      Kidney disease  "Paternal Uncle      Depression Paternal Uncle      Cancer Maternal Grandfather      COPD Maternal Grandfather      Cancer Paternal Grandmother      COPD Paternal Aunt       Social History     Tobacco Use    Smoking status: Former     Current packs/day: 0.00     Average packs/day: 1 pack/day for 39.3 years (39.3 ttl pk-yrs)     Types: Cigarettes     Start date:      Quit date: 2023     Years since quittin.1     Passive exposure: Past    Smokeless tobacco: Never   Substance Use Topics    Alcohol use: Yes     Alcohol/week: 0.0 standard drinks of alcohol     Comment: socially    Drug use: No        Review of Systems:  ROS: a comprehensive review of systems was done that was negative unless otherwise stated in the HPI  OBJECTIVE:     Vital Signs (Most Recent)  Temp: 98.2 °F (36.8 °C) (24 1313)  Pulse: 75 (24 1313)  BP: (!) 143/86 (24 1313)  5' 5" (1.651 m)  91 kg (200 lb 9.9 oz)     Physical Exam:  Physical Exam  Constitutional:       General: He is not in acute distress.     Appearance: Normal appearance. He is obese. He is not ill-appearing.   Cardiovascular:      Rate and Rhythm: Normal rate and regular rhythm.      Comments: 2+ L DP and PT  Not palpable R DP or PT  1+ R femoral pulse, 2+ L femoral pulse  Pulmonary:      Effort: Pulmonary effort is normal.   Abdominal:      General: Abdomen is flat.      Palpations: Abdomen is soft.      Comments: Well healed infraumbilical scar   Musculoskeletal:         General: Normal range of motion.   Skin:     General: Skin is warm and dry.      Comments: No evidence of ulcers or wounds   Neurological:      Mental Status: He is alert.       ASSESSMENT/PLAN:     59 y.o. male w/ PMH of HTN, HLD, obesity, and PAD s/p bilateral iliac stenting x3 presents with worsening right leg claudication with walking.  His right iliac stent is most likely down.  Given his history of multiple stentings with failure, would require an open operation with aortobifem " bypass, however at this time given his work constraints, strongly prefers temporary measure with angiogram with iliac stenting.    PLAN:  - Dobutamine stress test ordered, no evidence of myocardial ischemia  - CTA chest abdomen pelvis performed, likely would perform an aorto to bi-iliac bypass at the bifurcation of the external iliac arteries, to avoid bilateral groin incisions with risk of infection.  - However given patient were constraints, he strongly prefers to schedule aortogram with bi-iliac stenting in 1 month.   - Start on Plavix today, obtain Plavix response assay one-week prior to procedure to assess need for alternative medications.

## 2024-06-18 ENCOUNTER — HOSPITAL ENCOUNTER (OUTPATIENT)
Dept: RADIOLOGY | Facility: HOSPITAL | Age: 59
Discharge: HOME | End: 2024-06-18
Attending: SURGERY
Payer: COMMERCIAL

## 2024-06-18 DIAGNOSIS — Z01.818 PREOP EXAMINATION: ICD-10-CM

## 2024-06-18 PROCEDURE — 71045 X-RAY EXAM CHEST 1 VIEW: CPT | Mod: 26,,, | Performed by: RADIOLOGY

## 2024-06-18 PROCEDURE — 71045 X-RAY EXAM CHEST 1 VIEW: CPT | Mod: TC

## 2024-08-03 ENCOUNTER — LAB VISIT (OUTPATIENT)
Dept: LAB | Facility: HOSPITAL | Age: 59
End: 2024-08-03
Attending: STUDENT IN AN ORGANIZED HEALTH CARE EDUCATION/TRAINING PROGRAM
Payer: COMMERCIAL

## 2024-08-03 DIAGNOSIS — I73.9 PAD (PERIPHERAL ARTERY DISEASE): ICD-10-CM

## 2024-08-03 LAB — PLATELET RESPONSE PLAVIX: 52 PRU (ref 194–418)

## 2024-08-03 PROCEDURE — 85576 BLOOD PLATELET AGGREGATION: CPT | Performed by: STUDENT IN AN ORGANIZED HEALTH CARE EDUCATION/TRAINING PROGRAM

## 2024-08-03 PROCEDURE — 36415 COLL VENOUS BLD VENIPUNCTURE: CPT | Mod: PO | Performed by: STUDENT IN AN ORGANIZED HEALTH CARE EDUCATION/TRAINING PROGRAM

## 2024-08-08 ENCOUNTER — TELEPHONE (OUTPATIENT)
Dept: VASCULAR SURGERY | Facility: CLINIC | Age: 59
End: 2024-08-08
Payer: COMMERCIAL

## 2024-08-08 ENCOUNTER — PATIENT MESSAGE (OUTPATIENT)
Dept: VASCULAR SURGERY | Facility: CLINIC | Age: 59
End: 2024-08-08
Payer: COMMERCIAL

## 2024-08-09 ENCOUNTER — ANESTHESIA EVENT (OUTPATIENT)
Dept: SURGERY | Facility: HOSPITAL | Age: 59
End: 2024-08-09
Payer: COMMERCIAL

## 2024-08-09 ENCOUNTER — ANESTHESIA (OUTPATIENT)
Dept: SURGERY | Facility: HOSPITAL | Age: 59
End: 2024-08-09
Payer: COMMERCIAL

## 2024-08-09 ENCOUNTER — HOSPITAL ENCOUNTER (OUTPATIENT)
Facility: HOSPITAL | Age: 59
Discharge: HOME | End: 2024-08-09
Attending: SURGERY | Admitting: SURGERY
Payer: COMMERCIAL

## 2024-08-09 VITALS
HEIGHT: 65 IN | SYSTOLIC BLOOD PRESSURE: 137 MMHG | TEMPERATURE: 98 F | HEART RATE: 64 BPM | DIASTOLIC BLOOD PRESSURE: 85 MMHG | RESPIRATION RATE: 17 BRPM | WEIGHT: 196 LBS | BODY MASS INDEX: 32.65 KG/M2 | OXYGEN SATURATION: 96 %

## 2024-08-09 DIAGNOSIS — I74.5 OCCLUSION OF RIGHT ILIAC ARTERY: Primary | ICD-10-CM

## 2024-08-09 DIAGNOSIS — I73.9 PAD (PERIPHERAL ARTERY DISEASE): ICD-10-CM

## 2024-08-09 PROCEDURE — 36000707: Performed by: SURGERY

## 2024-08-09 PROCEDURE — 25000003 PHARM REV CODE 250: Performed by: STUDENT IN AN ORGANIZED HEALTH CARE EDUCATION/TRAINING PROGRAM

## 2024-08-09 PROCEDURE — 27201423 OPTIME MED/SURG SUP & DEVICES STERILE SUPPLY: Performed by: SURGERY

## 2024-08-09 PROCEDURE — 71000016 HC POSTOP RECOV ADDL HR: Performed by: SURGERY

## 2024-08-09 PROCEDURE — C1894 INTRO/SHEATH, NON-LASER: HCPCS | Performed by: SURGERY

## 2024-08-09 PROCEDURE — 36000706: Performed by: SURGERY

## 2024-08-09 PROCEDURE — C1760 CLOSURE DEV, VASC: HCPCS | Performed by: SURGERY

## 2024-08-09 PROCEDURE — 25000003 PHARM REV CODE 250: Performed by: SURGERY

## 2024-08-09 PROCEDURE — C1874 STENT, COATED/COV W/DEL SYS: HCPCS | Performed by: SURGERY

## 2024-08-09 PROCEDURE — 71000015 HC POSTOP RECOV 1ST HR: Performed by: SURGERY

## 2024-08-09 PROCEDURE — 25000003 PHARM REV CODE 250: Performed by: NURSE ANESTHETIST, CERTIFIED REGISTERED

## 2024-08-09 PROCEDURE — 37221 PR REVASCULARIZE ILIAC ARTERY,ANGIOPLASTY/STENT, INITIAL VESSEL: CPT | Mod: RT,,, | Performed by: SURGERY

## 2024-08-09 PROCEDURE — 63600175 PHARM REV CODE 636 W HCPCS: Performed by: NURSE ANESTHETIST, CERTIFIED REGISTERED

## 2024-08-09 PROCEDURE — 71000044 HC DOSC ROUTINE RECOVERY FIRST HOUR: Performed by: SURGERY

## 2024-08-09 PROCEDURE — 63600175 PHARM REV CODE 636 W HCPCS: Performed by: SURGERY

## 2024-08-09 PROCEDURE — C1725 CATH, TRANSLUMIN NON-LASER: HCPCS | Performed by: SURGERY

## 2024-08-09 PROCEDURE — C1769 GUIDE WIRE: HCPCS | Performed by: SURGERY

## 2024-08-09 PROCEDURE — 37000009 HC ANESTHESIA EA ADD 15 MINS: Performed by: SURGERY

## 2024-08-09 PROCEDURE — 37000008 HC ANESTHESIA 1ST 15 MINUTES: Performed by: SURGERY

## 2024-08-09 PROCEDURE — 37220 PR REVASCULARIZE ILIAC ARTERY,ANGIOPLASTY, INITIAL VESSEL: CPT | Mod: 59,51,LT, | Performed by: SURGERY

## 2024-08-09 PROCEDURE — 25500020 PHARM REV CODE 255: Performed by: SURGERY

## 2024-08-09 PROCEDURE — 63600175 PHARM REV CODE 636 W HCPCS: Performed by: STUDENT IN AN ORGANIZED HEALTH CARE EDUCATION/TRAINING PROGRAM

## 2024-08-09 RX ORDER — MIDAZOLAM HYDROCHLORIDE 1 MG/ML
INJECTION INTRAMUSCULAR; INTRAVENOUS
Status: DISCONTINUED | OUTPATIENT
Start: 2024-08-09 | End: 2024-08-09

## 2024-08-09 RX ORDER — FENTANYL CITRATE 50 UG/ML
INJECTION, SOLUTION INTRAMUSCULAR; INTRAVENOUS
Status: DISCONTINUED | OUTPATIENT
Start: 2024-08-09 | End: 2024-08-09

## 2024-08-09 RX ORDER — PROTAMINE SULFATE 10 MG/ML
INJECTION, SOLUTION INTRAVENOUS
Status: DISCONTINUED | OUTPATIENT
Start: 2024-08-09 | End: 2024-08-09

## 2024-08-09 RX ORDER — SODIUM CHLORIDE 9 MG/ML
INJECTION, SOLUTION INTRAVENOUS CONTINUOUS
Status: DISCONTINUED | OUTPATIENT
Start: 2024-08-09 | End: 2024-08-09 | Stop reason: HOSPADM

## 2024-08-09 RX ORDER — FENTANYL CITRATE 50 UG/ML
25 INJECTION, SOLUTION INTRAMUSCULAR; INTRAVENOUS EVERY 5 MIN PRN
Status: DISCONTINUED | OUTPATIENT
Start: 2024-08-09 | End: 2024-08-09 | Stop reason: HOSPADM

## 2024-08-09 RX ORDER — HEPARIN SODIUM 1000 [USP'U]/ML
INJECTION, SOLUTION INTRAVENOUS; SUBCUTANEOUS
Status: DISCONTINUED | OUTPATIENT
Start: 2024-08-09 | End: 2024-08-09

## 2024-08-09 RX ORDER — HEPARIN SODIUM 1000 [USP'U]/ML
INJECTION, SOLUTION INTRAVENOUS; SUBCUTANEOUS
Status: DISCONTINUED | OUTPATIENT
Start: 2024-08-09 | End: 2024-08-09 | Stop reason: HOSPADM

## 2024-08-09 RX ORDER — LIDOCAINE HYDROCHLORIDE 10 MG/ML
INJECTION, SOLUTION EPIDURAL; INFILTRATION; INTRACAUDAL; PERINEURAL
Status: DISCONTINUED | OUTPATIENT
Start: 2024-08-09 | End: 2024-08-09 | Stop reason: HOSPADM

## 2024-08-09 RX ORDER — GLUCAGON 1 MG
1 KIT INJECTION
Status: DISCONTINUED | OUTPATIENT
Start: 2024-08-09 | End: 2024-08-09 | Stop reason: HOSPADM

## 2024-08-09 RX ORDER — IODIXANOL 320 MG/ML
INJECTION, SOLUTION INTRAVASCULAR
Status: DISCONTINUED | OUTPATIENT
Start: 2024-08-09 | End: 2024-08-09 | Stop reason: HOSPADM

## 2024-08-09 RX ORDER — HALOPERIDOL 5 MG/ML
0.5 INJECTION INTRAMUSCULAR EVERY 10 MIN PRN
Status: DISCONTINUED | OUTPATIENT
Start: 2024-08-09 | End: 2024-08-09 | Stop reason: HOSPADM

## 2024-08-09 RX ADMIN — SODIUM CHLORIDE: 0.9 INJECTION, SOLUTION INTRAVENOUS at 07:08

## 2024-08-09 RX ADMIN — PROTAMINE SULFATE 5 MG: 10 INJECTION, SOLUTION INTRAVENOUS at 08:08

## 2024-08-09 RX ADMIN — CEFAZOLIN 2 G: 2 INJECTION, POWDER, FOR SOLUTION INTRAMUSCULAR; INTRAVENOUS at 07:08

## 2024-08-09 RX ADMIN — MIDAZOLAM HYDROCHLORIDE 1 MG: 2 INJECTION, SOLUTION INTRAMUSCULAR; INTRAVENOUS at 07:08

## 2024-08-09 RX ADMIN — MIDAZOLAM HYDROCHLORIDE 0.5 MG: 2 INJECTION, SOLUTION INTRAMUSCULAR; INTRAVENOUS at 07:08

## 2024-08-09 RX ADMIN — FENTANYL CITRATE 50 MCG: 50 INJECTION, SOLUTION INTRAMUSCULAR; INTRAVENOUS at 08:08

## 2024-08-09 RX ADMIN — FENTANYL CITRATE 25 MCG: 50 INJECTION, SOLUTION INTRAMUSCULAR; INTRAVENOUS at 07:08

## 2024-08-09 RX ADMIN — PROTAMINE SULFATE 10 MG: 10 INJECTION, SOLUTION INTRAVENOUS at 08:08

## 2024-08-09 RX ADMIN — FENTANYL CITRATE 50 MCG: 50 INJECTION, SOLUTION INTRAMUSCULAR; INTRAVENOUS at 07:08

## 2024-08-09 RX ADMIN — HEPARIN SODIUM 8000 UNITS: 1000 INJECTION, SOLUTION INTRAVENOUS; SUBCUTANEOUS at 07:08

## 2024-08-09 NOTE — H&P
FOCUSED SURGICAL H&P    Nanda Colin III is a 59 y.o. male. MRN is [id number].    CC: Here today for the following surgical procedure(s):  ANGIOGRAM (Bilateral: Chest)  AORTOGRAM WITH PTA STENT /ILLIAC STENTS (Bilateral: Groin)    HPI: For a detailed history of the patients history of present illness please refer to the last progress note. In brief, this is a 59 y.o. male with a known history of PVD, HTN, HLD, Osteoarthritis, and 2 past interventions on his iliac arteries, here today for surgical intervention. There has been no recent changes in the patients health, including fevers, chest pain, or shortness of breath, and no new medications have been started. The patient has not had anything to eat or drink for the last 8 hours. The patient has held all blood thinners.       Past Medical History:   Past Medical History:   Diagnosis Date    BPH (benign prostatic hypertrophy)     Family history of prostate cancer 8/5/2021    brother    Hernia     Hyperlipidemia     Hypertension, essential 12/30/2015    Incarcerated umbilical hernia 6/25/2016    Umbilical hernia        Past Surgical History:   Past Surgical History:   Procedure Laterality Date    ANGIOGRAPHY OF LOWER EXTREMITY Bilateral 11/9/2022    Procedure: Angiogram Extremity Unilateral;  Surgeon: Ruperto Bay MD;  Location: [facility] OR [address] [place] [address];  Service: Vascular;  Laterality: Bilateral;  3.7 min  1067.83 mGy  125.26 Gy.cm  104ml Dye    AORTOGRAPHY N/A 11/9/2022    Procedure: AORTOGRAM;  Surgeon: Ruperto Bay MD;  Location: [address];  Service: Vascular;  Laterality: N/A;    AORTOGRAPHY WITH SERIALOGRAPHY N/A 3/15/2022    Procedure: AORTOGRAM, WITH SERIALOGRAPHY;  Surgeon: Ruperto Bay MD;  Location: [facility] OR [address] [place] [address];  Service: Vascular;  Laterality: N/A;  aortogram w/ runoff & PTA,    COLONOSCOPY N/A 1/14/2019    Procedure: COLONOSCOPY;  Surgeon: Fritz Franco MD;  Location: [facility] ENDO (4TH FLR);  Service: Endoscopy;  Laterality: N/A;  Do not  cancel this order    COLONOSCOPY N/A 2023    Procedure: COLONOSCOPY;  Surgeon: Ja Alexander MD;  Location: [facility] ENDO (4TH FLR);  Service: Endoscopy;  Laterality: N/A;  inst portal-any md-tb-ok to hold plavix see te 22  Precall done -AA    HERNIA REPAIR      TRANSFORAMINAL EPIDURAL INJECTION OF STEROID Right 12/15/2021    Procedure: Injection,steroid,epidural,transforaminal approach Right L4 & L5 (May change depending MRI);  Surgeon: Stacey Mota MD;  Location: [facility] PAIN MGT;  Service: Pain Management;  Laterality: Right;    VASECTOMY      WISDOM TOOTH EXTRACTION         Social History:   Social History     Socioeconomic History    Marital status:      Spouse name: Lakia    Number of children: 2   Occupational History    Occupation: superintendent     Comment:  [employer]     Employer: [employer]   Tobacco Use    Smoking status: Former     Current packs/day: 0.00     Average packs/day: 1 pack/day for 39.3 years (39.3 ttl pk-yrs)     Types: Cigarettes     Start date:      Quit date: 2023     Years since quittin.3     Passive exposure: Past    Smokeless tobacco: Never   Substance and Sexual Activity    Alcohol use: Yes     Alcohol/week: 0.0 standard drinks of alcohol     Comment: socially    Drug use: No    Sexual activity: Yes     Partners: Female   Social History Narrative    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     Social Determinants of Health     Financial Resource Strain: Low Risk  (6/10/2024)    Overall Financial Resource Strain (CARDIA)     Difficulty of Paying Living Expenses: Not hard at all   Food Insecurity: No Food Insecurity (6/10/2024)    Hunger Vital Sign     Worried About Running Out of Food in the Last Year: Never true     Ran Out of Food in the Last Year: Never true   Transportation Needs: No Transportation Needs (2024)    Received from [facility]    PRAPARE - Transportation     Lack of Transportation (Medical): No     Lack of Transportation  (Non-Medical): No   Physical Activity: Unknown (6/10/2024)    Exercise Vital Sign     Days of Exercise per Week: 0 days   Stress: No Stress Concern Present (6/10/2024)    [employer] - Occupational Stress Questionnaire     Feeling of Stress : Only a little   Housing Stability: Unknown (6/10/2024)    Housing Stability Vital Sign     Unable to Pay for Housing in the Last Year: No       Family History:   Family History   Problem Relation Name Age of Onset    Stroke Father      Heart disease Paternal Uncle      Kidney disease Paternal Uncle      Depression Paternal Uncle      Cancer Maternal Grandfather      COPD Maternal Grandfather      Cancer Paternal Grandmother      COPD Paternal Aunt            Allergies:  Review of patient's allergies indicates:   Allergen Reactions    Adhesive Hives and Blisters     TEGADERM - Transparent Film         Medications:    Current Facility-Administered Medications:     0.9%  NaCl infusion, , Intravenous, Continuous, Marco Dennis MD    ceFAZolin 2 g in D5W 50 mL IVPB (MB+), 2 g, Intravenous, On Call Procedure, Marco Dennis MD    dextrose 10% bolus 125 mL 125 mL, 12.5 g, Intravenous, PRN, Preston Kenny MD    dextrose 10% bolus 250 mL 250 mL, 25 g, Intravenous, PRN, Preston Kenny MD                  Vital Signs:  Vitals:    08/09/24 0523   BP: 130/75   Pulse: 71   Resp: 16   Temp: 97.7 °F (36.5 °C)         Physical Exam:  Neuro: awake, alert, no acute distress.  HEENT: PERRLA, neck supple, no lymphadenopathy.  Heart: regular rate/rhythm  Lungs: equal chest expansion bilaterally, no increased work of breathing on RA  Abdomen: soft, non-distended, non-tender to palpation.  Extremities: warm, well-perfused       Labs:  Lab Results   Component Value Date/Time    WBC 8.42 06/17/2024 02:28 PM    HGB 15.3 06/17/2024 02:28 PM    HCT 45.0 06/17/2024 02:28 PM     06/17/2024 02:28 PM    MCV 85 06/17/2024 02:28 PM     Lab Results   Component Value Date/Time     " 06/17/2024 02:28 PM    K 4.0 06/17/2024 02:28 PM     06/17/2024 02:28 PM    CO2 24 06/17/2024 02:28 PM    BUN 9 06/17/2024 02:28 PM    GLU 92 06/17/2024 02:28 PM    MG 2.2 06/25/2016 11:10 AM    PHOS 3.9 06/25/2016 11:10 AM     No results found for: "INR", "PT", "PTT"  No components found for: "TROPI"  Lab Results   Component Value Date/Time    ALT 22 06/17/2024 02:28 PM    AST 17 06/17/2024 02:28 PM          Assessment/Plan:  59 y.o. male here today for the following surgical procedure:    ANGIOGRAM (Bilateral: Chest)  AORTOGRAM WITH PTA STENT /ILLIAC STENTS (Bilateral: Groin)       The indications for surgery, highlighting the risks and benefits of the procedure were discussed with the patient. These included but are not limited to swelling, bleeding, pain, infection, and adverse anesthesia-related event. I also discussed risk of injury to nearby structures. The patient seems to understand the risks, as well as the alternatives including nonoperative observation/survellience and wishes to proceed with the surgical intervention.    Patient has been examined, consented, and marked for laterality.      Plan discussed with and agreed by Dr. Phu Briggs, DO  PGY-1    "

## 2024-08-09 NOTE — PLAN OF CARE
Pt AAOx4, RR even and unlabored, color wnl, no acute distress. Tolerating liquids. Discharge instructions reviewed with patient/family with verbal understanding.    After patient ambulated, there was no hematoma formation, no swelling and no active bleeding. Pt still denies pain and tingling/numbness

## 2024-08-09 NOTE — OP NOTE
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 male who had undergone previous bilateral iliac stenting, who presented with severe right lower extremity claudication, found to have a reoccluded right iliac stent.  He was offered an aortogram with possible intervention. All risks, benefits, and alternatives were discussed and the patient understood and wished to proceed and gave written consent.     Operative Details:   The patient was brought to the operating room and placed in the operating room table in supine position.  He underwent sedation provided by Anesthesiology.  The bilateral groins were prepped and draped in the usual sterile fashion.  A time-out was performed.    Under ultrasound guidance, the right common femoral artery was accessed with a micropuncture needle, followed by a Mandril wire, followed by micropuncture sheath.  A sheathogram was performed which demonstrated accessed into the common femoral artery.  Following this a stiff  angled angled glide was advanced into the external iliac artery.  This was then exchanged for a short 7 Danish sheath.  Of the patient's left side, this procedure was repeated, with placement of a short 5 Danish sheath on the left common femoral artery.    The patient was then systemically heparinized.  An Omni flush catheter was placed up and over and an aortogram was performed, which demonstrated the occlusion of the right common iliac artery. Using the stiff angled glidewire through an angled Glidecath, the right common iliac artery lesion within the stent was accessed in a retrograde fashion from the right groin accessed.  It was able to be successfully crossed.  This was confirmed on a repeat aortogram.    Following this, the right common iliac artery was pre-dilated with a 4 x 60 mm Ultraverse balloon, while a 6 by 40 [place] balloon was used to protect the left common iliac artery.  Following this, a 7 x 59 VBX was used to reline the right common iliac artery, insufflated to the nominal pressure.  There was some difficulty with balloon expansion of the stent likely given the previous occlusion and thrombus.  But the stent was able to be fully expanded.  A repeat aortogram was performed, which demonstrated excellent filling through the stent, however there was still some proximal narrowing at the ostium of the right common iliac stent, secondary to highly calcific disease.  A 7 by 40 mm Jewell balloon was used to insufflate this, which did help achieve some luminal gain, however there was still some degree of residual stenosis present.  There was good runoff through the bilateral iliac stents.    This concluded the intervention.  Heparin was reversed with protamine.  A ProGlide device was deployed in the right common femoral artery without issue, and a 5 Danish Mynx device deployed in the left common femoral artery without issue.  There was no evidence of a groin hematoma at the site at the conclusion of the  case.    The patient was then liberated from anesthesia and brought to the postanesthesia care recovery unit in stable condition.    Dr. Bay was present and scrubbed for all aspects of the case.    All instrument and sponge counts were confirmed correct.

## 2024-08-09 NOTE — H&P
FOCUSED SURGICAL H&P     Nanda Colin III is a 59 y.o. male. MRN is [id number].     CC: Here today for the following surgical procedure(s):  ANGIOGRAM (Bilateral: Chest)  AORTOGRAM WITH PTA STENT /ILLIAC STENTS (Bilateral: Groin)     HPI: For a detailed history of the patients history of present illness please refer to the last progress note. In brief, this is a 59 y.o. male with a known history of PVD, HTN, HLD, Osteoarthritis, and 2 past interventions on his iliac arteries, here today for surgical intervention. There has been no recent changes in the patients health, including fevers, chest pain, or shortness of breath, and no new medications have been started. The patient has not had anything to eat or drink for the last 8 hours. The patient has held all blood thinners        Past Medical History:        Past Medical History:   Diagnosis Date    BPH (benign prostatic hypertrophy)      Family history of prostate cancer 8/5/2021     brother    Hernia      Hyperlipidemia      Hypertension, essential 12/30/2015    Incarcerated umbilical hernia 6/25/2016    Umbilical hernia           Past Surgical History:         Past Surgical History:   Procedure Laterality Date    ANGIOGRAPHY OF LOWER EXTREMITY Bilateral 11/9/2022     Procedure: Angiogram Extremity Unilateral;  Surgeon: Ruperto Bay MD;  Location: [facility] OR [address] [place] [address];  Service: Vascular;  Laterality: Bilateral;  3.7 min  1067.83 mGy  125.26 Gy.cm  104ml Dye    AORTOGRAPHY N/A 11/9/2022     Procedure: AORTOGRAM;  Surgeon: Ruperto Bay MD;  Location: [facility] OR [address] [place] [address];  Service: Vascular;  Laterality: N/A;    AORTOGRAPHY WITH SERIALOGRAPHY N/A 3/15/2022     Procedure: AORTOGRAM, WITH SERIALOGRAPHY;  Surgeon: Ruperto Bay MD;  Location: [facility] OR [address] [place] [address];  Service: Vascular;  Laterality: N/A;  aortogram w/ runoff & PTA,    COLONOSCOPY N/A 1/14/2019     Procedure: COLONOSCOPY;  Surgeon: Fritz Franco MD;  Location: [facility] ENDO (4TH FLR);  Service: Endoscopy;   Laterality: N/A;  Do not cancel this order    COLONOSCOPY N/A 2023     Procedure: COLONOSCOPY;  Surgeon: Ja Alexander MD;  Location: [facility] ENDO ([address] [place] [address]);  Service: Endoscopy;  Laterality: N/A;  inst portal-any md-tb-ok to hold plavix see te 22  Precall done -AA    HERNIA REPAIR        TRANSFORAMINAL EPIDURAL INJECTION OF STEROID Right 12/15/2021     Procedure: Injection,steroid,epidural,transforaminal approach Right L4 & L5 (May change depending MRI);  Surgeon: Stacey Mota MD;  Location: [facility] PAIN MGT;  Service: Pain Management;  Laterality: Right;    VASECTOMY        WISDOM TOOTH EXTRACTION             Social History:   Social History            Socioeconomic History    Marital status:        Spouse name: Lakia    Number of children: 2   Occupational History    Occupation: superintendent       Comment: [employer]       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      Social Determinants of Health           Financial Resource Strain: Low Risk  (6/10/2024)     Overall Financial Resource Strain (CARDIA)      Difficulty of Paying Living Expenses: Not hard at all   Food Insecurity: No Food Insecurity (6/10/2024)     Hunger Vital Sign      Worried About Running Out of Food in the Last Year: Never true      Ran Out of Food in the Last Year: Never true   Transportation Needs: No Transportation Needs (2024)     [facility]      Lack of Transportation (Medical): No      Lack of Transportation (Non-Medical): No   Physical Activity: Unknown (6/10/2024)     Exercise Vital Sign      Days of Exercise per Week: 0 days   Stress: No Stress Concern Present (6/10/2024)     [employer] [place] [employer] - Occupational Stress Questionnaire      Feeling of Stress : Only a little   Housing Stability: Unknown (6/10/2024)     Housing Stability Vital Sign      Unable to Pay for Housing in the Last Year: No         Family History:          Family History   Problem Relation Name Age of Onset    Stroke Father        Heart disease Paternal Uncle        Kidney disease Paternal Uncle        Depression Paternal Uncle        Cancer Maternal Grandfather        COPD Maternal Grandfather        Cancer Paternal Grandmother        COPD Paternal Aunt                Allergies:        Review of patient's allergies indicates:   Allergen Reactions    Adhesive Hives and Blisters       TEGADERM - Transparent Film            Medications:    Current Medications      Current Facility-Administered Medications:     0.9%  NaCl infusion, , Intravenous, Continuous, Marco Dennis MD    ceFAZolin 2 g in D5W 50 mL IVPB (MB+), 2 g, Intravenous, On Call Procedure, Marco Dennis MD    dextrose 10% bolus 125 mL 125 mL, 12.5 g, Intravenous, PRN, Preston Kenny MD    dextrose 10% bolus 250 mL 250 mL, 25 g, Intravenous, PRN, Preston Kenny MD                      Vital Signs:      Vitals:     08/09/24 0523   BP: 130/75   Pulse: 71   Resp: 16   Temp: 97.7 °F (36.5 °C)            Physical Exam:  Neuro: awake, alert, no acute distress.  HEENT: PERRLA, neck supple, no lymphadenopathy.  Heart: regular rate/rhythm  Lungs: equal chest expansion bilaterally, no increased work of breathing on RA  Abdomen: soft, non-distended, non-tender to palpation.  Extremities: warm, well-perfused        Labs:        Lab Results   Component Value Date/Time     WBC 8.42 06/17/2024 02:28  "PM     HGB 15.3 06/17/2024 02:28 PM     HCT 45.0 06/17/2024 02:28 PM      06/17/2024 02:28 PM     MCV 85 06/17/2024 02:28 PM            Lab Results   Component Value Date/Time      06/17/2024 02:28 PM     K 4.0 06/17/2024 02:28 PM      06/17/2024 02:28 PM     CO2 24 06/17/2024 02:28 PM     BUN 9 06/17/2024 02:28 PM     GLU 92 06/17/2024 02:28 PM     MG 2.2 06/25/2016 11:10 AM     PHOS 3.9 06/25/2016 11:10 AM      No results found for: "INR", "PT", "PTT"  No components found for: "TROPI"        Lab Results   Component Value Date/Time     ALT 22 06/17/2024 02:28 PM     AST 17 06/17/2024 02:28 PM            Assessment/Plan:  59 y.o. male here today for the following surgical procedure:     ANGIOGRAM (Bilateral: Chest)  AORTOGRAM WITH PTA STENT /ILLIAC STENTS (Bilateral: Groin)        The indications for surgery, highlighting the risks and benefits of the procedure were discussed with the patient. These included but are not limited to swelling, bleeding, pain, infection, and adverse anesthesia-related event. I also discussed risk of injury to nearby structures. The patient seems to understand the risks, as well as the alternatives including nonoperative observation/survellience and wishes to proceed with the surgical intervention.     Patient has been examined, consented, and marked for laterality.        Plan discussed with and agreed by Dr. Phu Aparicio [facility] Surgery, PGY-1  "

## 2024-08-09 NOTE — DISCHARGE INSTRUCTIONS
Continue taking all of your home medications    Continue taking the aspirin 81mg and plavix 75mg daily  Remove dressing in one days  You may shower at that time, no soaking the wound (Bath, pool, hot tub, etc) for two weeks  Reasons to call the office:  - Fever over 101F  - Signs of infection at the wound (redness, pain, warmth, pus)  - Numbness, tingling, or pain in the leg/foot/groin  - Extensive bruising at the groin  - Firmness or bulge in the groin  - Bleeding from the access site in your groin    Once you go home, please abide by the following restrictions:     Rest in bed or a recliner for the remainder of the day following the procedure.      You should not go home alone the day of the procedure.     Lifting and activity restrictions depend on the insertion site for the procedure:     Groin:   - No heavy lifting of more than 5 pounds, running, swimming, or strenuous walking for at least 2 days after the angiogram.   - You may return to your usual activity after the two days.   - Do not take a hot bath or shower for at least 12 hours after discharge.     Because of the sedation you were given for your procedure, we recommend that you have someone stay with you overnight following your procedure.  - Do not drive a car or operate machinery for the remainder of the day.  - Do not consume any alcoholic beverages for the remainder of the day.  - Postpone signing any important papers or making any important decision for the remainder of the day.  - Do not take any muscle relaxants, sedatives, hypnotics, or mood-altering medication today unless ordered by your physician who is aware you are taking the medication today.     If bleeding occurs:  - Apply manual pressure, and immediately seek medical attention at the nearest hospital.   - Seek immediate medical attention if you experience loss of sensation, redness, swelling or discharge from the insertion site.

## 2024-08-09 NOTE — ANESTHESIA PREPROCEDURE EVALUATION
[facility]  Anesthesia Pre-Operative Evaluation         Patient Name/: Nanda Colin III, 1965  MRN: [id number]    SUBJECTIVE:     Pre-operative evaluation for Procedure(s) (LRB):  ANGIOGRAM (Bilateral)  AORTOGRAM WITH PTA STENT /ILLIAC STENTS (Bilateral)     2024    Nanda Colin III is a 59 y.o. male     Patient now presents for the above procedure(s).    ________________________________________  No results found for this or any previous visit.    ________________________________________    LDA:        Drips:    0.9% NaCl   Intravenous Continuous           Patient Active Problem List   Diagnosis    Erectile dysfunction due to arterial insufficiency    Hypertension, essential    Hyperlipidemia    Colon polyps    BPH with urinary obstruction    Gastroesophageal reflux disease    GINNA (obstructive sleep apnea)    Laceration of left hand without foreign body    Lumbar radiculopathy    PAD (peripheral artery disease)    Right leg pain    Personal history of nicotine dependence    Coronary artery calcification seen on CT scan    Lung granuloma    Mediastinal adenopathy    Obesity (BMI 30.0-34.9)    Occlusion of right iliac artery       Review of patient's allergies indicates:   Allergen Reactions    Adhesive Hives and Blisters     TEGADERM - Transparent Film       Current Inpatient Medications:       No current facility-administered medications on file prior to encounter.     Current Outpatient Medications on File Prior to Encounter   Medication Sig Dispense Refill    finasteride (PROSCAR) 5 mg tablet Take 1 tablet (5 mg total) by mouth once daily. 90 tablet 3    rosuvastatin (CRESTOR) 40 MG Tab TAKE 1 TABLET BY MOUTH EVERY DAY 90 tablet 3    aspirin (ECOTRIN) 81 MG EC tablet Take 81 mg by mouth.      clopidogreL (PLAVIX) 75 mg tablet Take 1 tablet (75 mg total) by mouth once daily. 90 tablet 3    losartan (COZAAR) 25 MG tablet Take 1 tablet (25 mg total) by mouth once daily. 90  tablet 3    sildenafiL (VIAGRA) 100 MG tablet Take 1 tablet (100 mg total) by mouth daily as needed for Erectile Dysfunction. 30 tablet 2    silodosin (RAPAFLO) 8 mg Cap capsule Take 8 mg by mouth.      XARELTO 2.5 mg Tab Take 1 tablet by mouth 2 (two) times daily.         Past Surgical History:   Procedure Laterality Date    ANGIOGRAPHY OF LOWER EXTREMITY Bilateral 11/9/2022    Procedure: Angiogram Extremity Unilateral;  Surgeon: Ruperto Bay MD;  Location: [facility] OR 2ND FLR;  Service: Vascular;  Laterality: Bilateral;  3.7 min  1067.83 mGy  125.26 Gy.cm  104ml Dye    AORTOGRAPHY N/A 11/9/2022    Procedure: AORTOGRAM;  Surgeon: Ruperto Bay MD;  Location: [facility] OR 2ND FLR;  Service: Vascular;  Laterality: N/A;    AORTOGRAPHY WITH SERIALOGRAPHY N/A 3/15/2022    Procedure: AORTOGRAM, WITH SERIALOGRAPHY;  Surgeon: Ruperto Bay MD;  [facility];  Service: Vascular;  Laterality: N/A;  aortogram w/ runoff & PTA,    COLONOSCOPY N/A 1/14/2019    Procedure: COLONOSCOPY;  Surgeon: Fritz Franco MD;  Location: [facility] ENDO (4TH FLR);  Service: Endoscopy;  Laterality: N/A;  Do not cancel this order    COLONOSCOPY N/A 2/13/2023    Procedure: COLONOSCOPY;  Surgeon: Ja Alexander MD;  Location: [facility] ENDO (4TH FLR);  Service: Endoscopy;  Laterality: N/A;  inst portal-any md-tb-ok to hold plavix see te 11/18/22  Precall done 2/6-AA    HERNIA REPAIR      TRANSFORAMINAL EPIDURAL INJECTION OF STEROID Right 12/15/2021    Procedure: Injection,steroid,epidural,transforaminal approach Right L4 & L5 (May change depending MRI);  Surgeon: Stacey Mota MD;  Location: [facility] PAIN MGT;  Service: Pain Management;  Laterality: Right;    VASECTOMY      WISDOM TOOTH EXTRACTION         Social History:  Tobacco Use: Medium Risk (8/9/2024)    Patient History     Smoking Tobacco Use: Former     Smokeless Tobacco Use: Never     Passive Exposure: Past       Alcohol Use: Not At Risk (6/10/2024)    AUDIT-C     Frequency of Alcohol  Consumption: Monthly or less     Average Number of Drinks: Patient does not drink     Frequency of Binge Drinking: Never       OBJECTIVE:     Vital Signs Range:  BMI Readings from Last 1 Encounters:   06/17/24 33.38 kg/m²               Significant Labs:        Component Value Date/Time    WBC 8.42 06/17/2024 1428    HGB 15.3 06/17/2024 1428    HCT 45.0 06/17/2024 1428     06/17/2024 1428     06/17/2024 1428    K 4.0 06/17/2024 1428     06/17/2024 1428    CO2 24 06/17/2024 1428    GLU 92 06/17/2024 1428    BUN 9 06/17/2024 1428    CREATININE 0.8 06/17/2024 1428    MG 2.2 06/25/2016 1110    PHOS 3.9 06/25/2016 1110    CALCIUM 9.5 06/17/2024 1428    ALBUMIN 3.9 06/17/2024 1428    PROT 6.8 06/17/2024 1428    ALKPHOS 70 06/17/2024 1428    BILITOT 0.8 06/17/2024 1428    AST 17 06/17/2024 1428    ALT 22 06/17/2024 1428    HGBA1C 5.5 11/24/2023 0712        Please see Results Review for additional labs.     Diagnostic Studies: No relevant studies.    EKG:   Results for orders placed or performed during the hospital encounter of 06/17/24   SCHEDULED EKG 12-LEAD (to Muse)    Collection Time: 06/17/24  2:02 PM   Result Value Ref Range    QRS Duration 80 ms    [facility] QTC Calculation 406 ms    Narrative    Test Reason : Z01.818,    Vent. Rate : 070 BPM     Atrial Rate : 070 BPM     P-R Int : 172 ms          QRS Dur : 080 ms      QT Int : 376 ms       P-R-T Axes : 057 021 027 degrees     QTc Int : 406 ms    Normal sinus rhythm  Normal ECG  When compared with ECG of 14-JUN-2024 08:11,  No significant change was found  Confirmed by Froilan Ceja MD ([id number]) on 6/17/2024 2:48:31 PM    Referred By: BENITO SRENA           Confirmed By:Froilan Ceja MD       ECHO:  See subjective, if available.  Perfusion scan  Normal myocardial perfusion scan. There is no evidence of myocardial ischemia or infarction.    The gated perfusion images showed an ejection fraction of 68% at rest. The gated perfusion images showed an ejection  fraction of 61% post stress. Normal ejection fraction is greater than 47%.    There is normal wall motion at rest and post stress.    LV cavity size is normal at rest and normal at stress.    The ECG portion of the study is negative for ischemia.    The patient reported no chest pain during the stress test.    There were no arrhythmias during stress.    There are no prior studies for comparison  ASSESSMENT/PLAN:                                                                                                               08/09/2024  Nanda Colin III is a 59 y.o., male.      Pre-op Assessment          Review of Systems  Cardiovascular:     Hypertension   CAD           hyperlipidemia                             Pulmonary:        Sleep Apnea                Hepatic/GI:     GERD             Endocrine:        Obesity / BMI > 30      Physical Exam  General: Cooperative and Alert    Airway:  Mallampati: I   Mouth Opening: Normal  TM Distance: Normal  Tongue: Normal  Neck ROM: Normal ROM    Dental:  Intact        Anesthesia Plan  Type of Anesthesia, risks & benefits discussed:    Anesthesia Type: Gen Natural Airway, MAC  Intra-op Monitoring Plan: Standard ASA Monitors  Induction:  IV  Informed Consent: Informed consent signed with the Patient and all parties understand the risks and agree with anesthesia plan.  All questions answered.   ASA Score: 3  Day of Surgery Review of History & Physical: H&P Update referred to the surgeon/provider.    Ready For Surgery From Anesthesia Perspective.     .

## 2024-08-09 NOTE — PLAN OF CARE
Patient sitting up after bedrest. No bleeding, no hematoma, and no swelling noted. Pt denies any discomfort or numbness or tingling.

## 2024-08-09 NOTE — TRANSFER OF CARE
"Anesthesia Transfer of Care Note    Patient: Nanda Colin III    Procedure(s) Performed: Procedure(s) (LRB):  ANGIOGRAM (Bilateral)  AORTOGRAM WITH PTA STENT /ILLIAC STENTS (Bilateral)    Patient location: [facility]    Anesthesia Type: general and MAC    Transport from OR: Transported from OR on room air with adequate spontaneous ventilation    Post pain: adequate analgesia    Post assessment: no apparent anesthetic complications and tolerated procedure well    Post vital signs: stable    Level of consciousness: awake    Nausea/Vomiting: no nausea/vomiting    Complications: none    Transfer of care protocol was followed      Last vitals: Visit Vitals  /75 (BP Location: Right arm, Patient Position: Lying)   Pulse 71   Temp 36.5 °C (97.7 °F) (Temporal)   Resp 16   Ht 5' 5" (1.651 m)   Wt 88.9 kg (195 lb 15.8 oz)   SpO2 95%   BMI 32.61 kg/m²     "

## 2024-08-09 NOTE — BRIEF OP NOTE
[facility] - Surgery (2nd Fl)  Brief Operative Note    Surgery Date: 8/9/2024     Surgeons and Role:     * Ruperto Bay MD - Primary     * Leta Capps MD - Resident - Assisting     * Marco Dennis MD - Fellow        Pre-op Diagnosis:  PAD (peripheral artery disease) [I73.9]  Occlusion of right iliac artery [I74.5]    Post-op Diagnosis:  Post-Op Diagnosis Codes:     * PAD (peripheral artery disease) [I73.9]     * Occlusion of right iliac artery [I74.5]    Procedure(s) (LRB):  Ultrasound guided access R CFA   Ultrasound guided access L CFA   Aortogram, limited bilateral leg angiogram   PTA stent R SEAN with 7x59mm VBX  PTA L SEAN with 6x40mm [place]  7 Fr Perclose closure R CFA   5 Fr Mynx closure L CFA     Anesthesia: Local MAC     Operative Findings:   Successful recanalization of R SEAN occluded stent, relined with 7x59mm VBX, highly calcific proximal R SEAN disease ballooned with 7mm [place], L SEAN protected with 6mm [place]    Estimated Blood Loss: 75cc         Specimens:   Specimen (24h ago, onward)      None              Discharge Note    OUTCOME: Patient tolerated treatment/procedure well without complication and is now ready for discharge.    DISPOSITION: Home or Self Care    FINAL DIAGNOSIS:  <principal problem not specified>    FOLLOWUP: In clinic    DISCHARGE INSTRUCTIONS:  No discharge procedures on file.  Continue taking all of your home medications    Continue taking the aspirin 81mg and plavix 75mg daily  Remove dressing in one days  You may shower at that time, no soaking the wound (Bath, pool, hot tub, etc) for two weeks  Reasons to call the office:  - Fever over 101F  - Signs of infection at the wound (redness, pain, warmth, pus)  - Numbness, tingling, or pain in the leg/foot/groin  - Extensive bruising at the groin  - Firmness or bulge in the groin  - Bleeding from the access site in your groin    Once you go home, please abide by the following restrictions:     Rest in bed or a recliner for the  remainder of the day following the procedure.      You should not go home alone the day of the procedure.     Lifting and activity restrictions depend on the insertion site for the procedure:     Groin:   - No heavy lifting of more than 5 pounds, running, swimming, or strenuous walking for at least 2 days after the angiogram.   - You may return to your usual activity after the two days.   - Do not take a hot bath or shower for at least 12 hours after discharge.     Because of the sedation you were given for your procedure, we recommend that you have someone stay with you overnight following your procedure.  - Do not drive a car or operate machinery for the remainder of the day.  - Do not consume any alcoholic beverages for the remainder of the day.  - Postpone signing any important papers or making any important decision for the remainder of the day.  - Do not take any muscle relaxants, sedatives, hypnotics, or mood-altering medication today unless ordered by your physician who is aware you are taking the medication today.     If bleeding occurs:  - Apply manual pressure, and immediately seek medical attention at the nearest hospital.   - Seek immediate medical attention if you experience loss of sensation, redness, swelling or discharge from the insertion site.
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
## 2024-11-10 DIAGNOSIS — I10 HYPERTENSION, ESSENTIAL: ICD-10-CM

## 2024-11-10 RX ORDER — LOSARTAN POTASSIUM 25 MG/1
25 TABLET ORAL
Qty: 90 TABLET | Refills: 0 | Status: SHIPPED | OUTPATIENT
Start: 2024-11-10

## 2024-11-10 NOTE — TELEPHONE ENCOUNTER
No care due was identified.  [facility] Embedded Care Due Messages. Reference number: [id number].   11/10/2024 7:05:04 AM CST

## 2024-11-16 ENCOUNTER — HOSPITAL ENCOUNTER (OUTPATIENT)
Dept: RADIOLOGY | Facility: HOSPITAL | Age: 59
Discharge: HOME | End: 2024-11-16
Attending: FAMILY MEDICINE
Payer: COMMERCIAL

## 2024-11-16 DIAGNOSIS — Z87.891 PERSONAL HISTORY OF NICOTINE DEPENDENCE: ICD-10-CM

## 2024-11-16 DIAGNOSIS — R59.0 MEDIASTINAL ADENOPATHY: ICD-10-CM

## 2024-11-16 PROCEDURE — 71271 CT THORAX LUNG CANCER SCR C-: CPT | Mod: TC

## 2024-11-16 PROCEDURE — 71271 CT THORAX LUNG CANCER SCR C-: CPT | Mod: 26,,, | Performed by: RADIOLOGY

## 2024-12-21 ENCOUNTER — OFFICE VISIT (OUTPATIENT)
Dept: INTERNAL MEDICINE | Facility: CLINIC | Age: 59
End: 2024-12-21
Payer: COMMERCIAL

## 2024-12-21 VITALS
BODY MASS INDEX: 33.53 KG/M2 | HEIGHT: 65 IN | DIASTOLIC BLOOD PRESSURE: 80 MMHG | SYSTOLIC BLOOD PRESSURE: 122 MMHG | WEIGHT: 201.25 LBS | HEART RATE: 88 BPM | OXYGEN SATURATION: 96 %

## 2024-12-21 DIAGNOSIS — H81.399 PERIPHERAL POSITIONAL VERTIGO, UNSPECIFIED LATERALITY: ICD-10-CM

## 2024-12-21 DIAGNOSIS — H61.23 BILATERAL IMPACTED CERUMEN: Primary | ICD-10-CM

## 2024-12-21 DIAGNOSIS — H91.93 BILATERAL HEARING LOSS, UNSPECIFIED HEARING LOSS TYPE: ICD-10-CM

## 2024-12-21 PROCEDURE — 3079F DIAST BP 80-89 MM HG: CPT | Mod: CPTII,S$GLB,, | Performed by: INTERNAL MEDICINE

## 2024-12-21 PROCEDURE — 3074F SYST BP LT 130 MM HG: CPT | Mod: CPTII,S$GLB,, | Performed by: INTERNAL MEDICINE

## 2024-12-21 PROCEDURE — 99999 PR PBB SHADOW E&M-EST. PATIENT-LVL III: CPT | Mod: PBBFAC,,, | Performed by: INTERNAL MEDICINE

## 2024-12-21 PROCEDURE — 4010F ACE/ARB THERAPY RXD/TAKEN: CPT | Mod: CPTII,S$GLB,, | Performed by: INTERNAL MEDICINE

## 2024-12-21 PROCEDURE — 3008F BODY MASS INDEX DOCD: CPT | Mod: CPTII,S$GLB,, | Performed by: INTERNAL MEDICINE

## 2024-12-21 PROCEDURE — 99214 OFFICE O/P EST MOD 30 MIN: CPT | Mod: S$GLB,,, | Performed by: INTERNAL MEDICINE

## 2024-12-21 PROCEDURE — 1159F MED LIST DOCD IN RCRD: CPT | Mod: CPTII,S$GLB,, | Performed by: INTERNAL MEDICINE

## 2024-12-21 PROCEDURE — 1160F RVW MEDS BY RX/DR IN RCRD: CPT | Mod: CPTII,S$GLB,, | Performed by: INTERNAL MEDICINE

## 2024-12-21 RX ORDER — ROSUVASTATIN CALCIUM 5 MG/1
5 TABLET, COATED ORAL DAILY
COMMUNITY

## 2024-12-21 NOTE — PROGRESS NOTES
59-year-old male   Reason for his visit that has it for awhile he has had diminished hearing.  Hard for him to hear in crowded places.  He has been told of hearing impairment based on audiogram about 5 years ago.  Also in the past he has had cerumen impaction for which he has had his ears cleaned.  He reports no other symptoms associated with this other than times when he bends over he can have a brief episode of dizziness.    No nausea vomiting.  No headaches.  Breathing is fine no chest pain    Medical history   Hypertension   Hyperlipidemia   Peripheral arteriovascular disease with previous stents  Coronary artery calcification  Obstructive sleep apnea, does not use CPAP    Medication list reviewed per med card.  He is on the medicine Xarelto which was initiated after his last set of stents in August 2024    Examination   Weight 201 lb   BMI 33.49   Pulse 80   Blood pressure by me 128/80   Chest clear breath sounds   Heart regular rate rhythm   2+ carotid pulses no bruits  Both ear canals are impacted with cerumen    Both ear canals was flushed out with the water in which large hard pieces cerumen which was move.  He had that has notice some degree of improvement with the hearing    Impression   Cerumen impaction   Hearing impairment   Positional vertigo    Plan   He will let us know in his couple days about his hearing if that has been more significant improvement.  That has not consider audiogram   Head and neck exercises discussed regarding the vertigo

## 2025-01-10 ENCOUNTER — OFFICE VISIT (OUTPATIENT)
Dept: INTERNAL MEDICINE | Facility: CLINIC | Age: 60
End: 2025-01-10
Attending: FAMILY MEDICINE
Payer: COMMERCIAL

## 2025-01-10 VITALS
SYSTOLIC BLOOD PRESSURE: 120 MMHG | BODY MASS INDEX: 30.67 KG/M2 | DIASTOLIC BLOOD PRESSURE: 78 MMHG | HEIGHT: 65 IN | WEIGHT: 184.06 LBS | HEART RATE: 83 BPM | OXYGEN SATURATION: 96 %

## 2025-01-10 DIAGNOSIS — J84.10 LUNG GRANULOMA: ICD-10-CM

## 2025-01-10 DIAGNOSIS — R59.0 MEDIASTINAL ADENOPATHY: ICD-10-CM

## 2025-01-10 DIAGNOSIS — I73.9 PAD (PERIPHERAL ARTERY DISEASE): ICD-10-CM

## 2025-01-10 DIAGNOSIS — I10 HYPERTENSION, ESSENTIAL: ICD-10-CM

## 2025-01-10 DIAGNOSIS — Z00.00 ANNUAL PHYSICAL EXAM: Primary | ICD-10-CM

## 2025-01-10 DIAGNOSIS — E78.49 OTHER HYPERLIPIDEMIA: ICD-10-CM

## 2025-01-10 DIAGNOSIS — I25.10 CORONARY ARTERY CALCIFICATION SEEN ON CT SCAN: ICD-10-CM

## 2025-01-10 DIAGNOSIS — Z87.891 PERSONAL HISTORY OF NICOTINE DEPENDENCE: ICD-10-CM

## 2025-01-10 PROBLEM — I74.5 OCCLUSION OF RIGHT ILIAC ARTERY: Status: RESOLVED | Noted: 2024-01-29 | Resolved: 2025-01-10

## 2025-01-10 PROCEDURE — 99999 PR PBB SHADOW E&M-EST. PATIENT-LVL IV: CPT | Mod: PBBFAC,,, | Performed by: FAMILY MEDICINE

## 2025-01-10 RX ORDER — LOSARTAN POTASSIUM 25 MG/1
25 TABLET ORAL DAILY
Qty: 90 TABLET | Refills: 3 | Status: SHIPPED | OUTPATIENT
Start: 2025-01-10

## 2025-01-10 RX ORDER — ROSUVASTATIN CALCIUM 20 MG/1
20 TABLET, COATED ORAL DAILY
Qty: 90 TABLET | Refills: 1 | Status: SHIPPED | OUTPATIENT
Start: 2025-01-10

## 2025-01-10 NOTE — PROGRESS NOTES
Subjective:       Patient ID: Nanda Colin III is a 59 y.o. male.    Chief Complaint: Annual Exam    Established patient for an annual wellness check/physical exam and also chronic disease management. Specific complaints - see dictation, M*model entries and please see ROS.  Past, Surgical, Family, Social Histories; Medications, Allergies reviewed and reconciled.  Health maintenance file reviewed and addressed items due. Recent applicable lab, imaging and cardiovascular results reviewed.  Problem list items reviewed and modified or added entries (in the overview section) may not be transcribed into this encounter note due to note writer format.        Last seen October 20, 2022, lost to follow-up.  Has been out of town working.  Since then has had  3 peripheral vascular procedures and is followed by vascular surgery.  No current claudication complaints.  Quit smoking last year.  I applied that.      No chest pain or dyspnea reported.      I observe that his current Crestor doses listed as 5 mg in his chart.  Unclear why this is.  I am unable to determine by chart review.  He had been on Crestor 40 mg previously.  In my prior assessments from years back, was tolerating 20 mg but at higher doses experienced some aches and pains.  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  Cardiology had maintain this for some period of time.  He saw them last about a year ago.  Had also seen Pulmonary Medicine in the past but they cleared him  to continue annual LDCT lung screening.        Review of Systems   Constitutional:  Negative for appetite change, chills, diaphoresis, fatigue and fever.   HENT:  Negative for congestion, postnasal drip, rhinorrhea, sore throat and trouble swallowing.    Eyes:  Negative for visual disturbance.   Respiratory:  Negative for cough, choking, chest tightness, shortness of breath and wheezing.    Cardiovascular:  Negative for chest pain and leg  swelling.   Gastrointestinal:  Negative for abdominal distention, abdominal pain, diarrhea, nausea and vomiting.   Genitourinary:  Negative for difficulty urinating and hematuria.   Musculoskeletal:  Positive for arthralgias. Negative for myalgias.   Skin:  Negative for rash.   Neurological:  Negative for weakness, light-headedness and headaches.   Psychiatric/Behavioral:  Negative for confusion and dysphoric mood.        Objective:      Physical Exam  Vitals and nursing note reviewed.   Constitutional:       Appearance: He is well-developed. He is not diaphoretic.   Eyes:      General: No scleral icterus.  Neck:      Thyroid: No thyromegaly.      Vascular: No JVD.      Trachea: No tracheal deviation.   Cardiovascular:      Rate and Rhythm: Normal rate and regular rhythm.      Heart sounds: Normal heart sounds. No murmur heard.     No friction rub. No gallop.   Pulmonary:      Effort: Pulmonary effort is normal. No respiratory distress.      Breath sounds: Normal breath sounds. No wheezing or rales.   Abdominal:      General: There is no distension.      Palpations: Abdomen is soft. There is no mass.      Tenderness: There is no abdominal tenderness. There is no guarding or rebound.   Musculoskeletal:      Cervical back: Normal range of motion and neck supple.   Lymphadenopathy:      Cervical: No cervical adenopathy.   Skin:     General: Skin is warm and dry.      Findings: No erythema or rash.   Neurological:      Mental Status: He is alert and oriented to person, place, and time.      Cranial Nerves: No cranial nerve deficit.      Motor: No tremor.      Coordination: Coordination normal.      Gait: Gait normal.   Psychiatric:         Behavior: Behavior normal.         Thought Content: Thought content normal.         Judgment: Judgment normal.         Assessment:       1. Annual physical exam    2. Personal history of nicotine dependence    3. Lung granuloma    4. Hypertension, essential    5. Other hyperlipidemia     6. PAD (peripheral artery disease)    7. Mediastinal adenopathy    8. Coronary artery calcification seen on CT scan        Plan:     Medication List with Changes/Refills   New Medications    ROSUVASTATIN (CRESTOR) 20 MG TABLET    Take 1 tablet (20 mg total) by mouth once daily.   Current Medications    ASPIRIN (ECOTRIN) 81 MG EC TABLET    Take 81 mg by mouth.    CLOPIDOGREL (PLAVIX) 75 MG TABLET    Take 1 tablet (75 mg total) by mouth once daily.    FINASTERIDE (PROSCAR) 5 MG TABLET    Take 1 tablet (5 mg total) by mouth once daily.    SILDENAFIL (VIAGRA) 100 MG TABLET    Take 1 tablet (100 mg total) by mouth daily as needed for Erectile Dysfunction.    SILODOSIN (RAPAFLO) 8 MG CAP CAPSULE    Take 8 mg by mouth.    XARELTO 2.5 MG TAB    Take 1 tablet by mouth 2 (two) times daily.   Changed and/or Refilled Medications    Modified Medication Previous Medication    LOSARTAN (COZAAR) 25 MG TABLET losartan (COZAAR) 25 MG tablet       Take 1 tablet (25 mg total) by mouth once daily.    TAKE 1 TABLET BY MOUTH EVERY DAY   Discontinued Medications    ROSUVASTATIN (CRESTOR) 5 MG TABLET    Take 5 mg by mouth once daily.     1. Annual physical exam    2. Personal history of nicotine dependence  Overview:  -30+ pack years, quit circa 2024    Orders:  -     CT Chest Lung Screening Low Dose; Future; Expected date: 01/10/2025    3. Lung granuloma  Overview:  LDCT 7/12/2021      4. Hypertension, essential  -     losartan (COZAAR) 25 MG tablet; Take 1 tablet (25 mg total) by mouth once daily.  Dispense: 90 tablet; Refill: 3    5. Other hyperlipidemia  Assessment & Plan:  -2015 entry '-tolerates rosuvastatin 20, but c/o myalgia with higher doses'  -chart review: atorva [id number], rosuva [id number]  -chart shows rosuva 5 now, unclear why, up to 12/21/2024 showed at 40mg.    -restart at 20 and titrate     Orders:  -     Ambulatory referral/consult to Cardiology; Future; Expected date: 01/17/2025  -     rosuvastatin (CRESTOR) 20 MG  tablet; Take 1 tablet (20 mg total) by mouth once daily.  Dispense: 90 tablet; Refill: 1    6. PAD (peripheral artery disease)  Overview:  -followed by vascular surgery  -interventions with stenting:  3/17/2022 Joby  11/10/2022 Joby  8/9/2024 Joby  [facility] ([facility])      Orders:  -     Ambulatory referral/consult to Cardiology; Future; Expected date: 01/17/2025  -     rosuvastatin (CRESTOR) 20 MG tablet; Take 1 tablet (20 mg total) by mouth once daily.  Dispense: 90 tablet; Refill: 1    7. Mediastinal adenopathy  Overview:  -borderline on LDCT - stable  -1/19/2023 Pulmonary consult, 4/5/23 result comment (LDCT annually)    Assessment & Plan:  -4/5/2023 pulm 'recommend you continue annual lung cancer surveillance imaging.'    Orders:  -     CT Chest Lung Screening Low Dose; Future; Expected date: 01/10/2025    8. Coronary artery calcification seen on CT scan  Overview:  -LDCT 7/12/2021 - on statin  -cardiology 1/29/2024  -nuc stress non ischemic 6/14/2024      Orders:  -     Ambulatory referral/consult to Cardiology; Future; Expected date: 01/17/2025  -     rosuvastatin (CRESTOR) 20 MG tablet; Take 1 tablet (20 mg total) by mouth once daily.  Dispense: 90 tablet; Refill: 1      See meds, orders, follow up, routing and instructions sections of encounter and AVS. Discussed with patient and provided on AVS.    Lab Results   Component Value Date     11/16/2024    K 4.3 11/16/2024     11/16/2024    BUN 12 11/16/2024    CREATININE 0.9 11/16/2024     11/16/2024    HGBA1C 5.5 11/24/2023    MG 2.2 06/25/2016    AST 19 11/16/2024    ALT 22 11/16/2024    ALBUMIN 4.0 11/16/2024    PROT 7.0 11/16/2024    BILITOT 1.1 (H) 11/16/2024    CHOL 161 11/16/2024    HDL 31 (L) 11/16/2024    LDLCALC 101.8 11/16/2024    TRIG 141 11/16/2024    WBC 8.42 06/17/2024    HGB 15.3 06/17/2024    HCT 45.0 06/17/2024     06/17/2024    PSA 0.94 11/16/2024    TSH 2.258 07/14/2020         Discussed diet and exercise as  therapeutic modalities for metabolic and other conditions. Provided patient information, which are included as links on the AVS for detailed information.

## 2025-01-10 NOTE — ASSESSMENT & PLAN NOTE
-2015 entry '-tolerates rosuvastatin 20, but c/o myalgia with higher doses'  -chart review: atorva [id number], rosuva [phone]  -chart shows rosuva 5 now, unclear why, up to 12/21/2024 showed at 40mg.    -restart at 20 and titrate

## 2025-01-27 ENCOUNTER — OFFICE VISIT (OUTPATIENT)
Dept: CARDIOLOGY | Facility: CLINIC | Age: 60
End: 2025-01-27
Attending: FAMILY MEDICINE
Payer: COMMERCIAL

## 2025-01-27 VITALS
HEART RATE: 85 BPM | BODY MASS INDEX: 33.57 KG/M2 | DIASTOLIC BLOOD PRESSURE: 78 MMHG | SYSTOLIC BLOOD PRESSURE: 120 MMHG | HEIGHT: 65 IN | WEIGHT: 201.5 LBS

## 2025-01-27 DIAGNOSIS — I25.10 CORONARY ARTERY CALCIFICATION SEEN ON CT SCAN: ICD-10-CM

## 2025-01-27 DIAGNOSIS — I73.9 PAD (PERIPHERAL ARTERY DISEASE): ICD-10-CM

## 2025-01-27 DIAGNOSIS — E78.49 OTHER HYPERLIPIDEMIA: ICD-10-CM

## 2025-01-27 PROCEDURE — 99999 PR PBB SHADOW E&M-EST. PATIENT-LVL IV: CPT | Mod: PBBFAC,,, | Performed by: INTERNAL MEDICINE

## 2025-01-27 PROCEDURE — 3078F DIAST BP <80 MM HG: CPT | Mod: CPTII,S$GLB,, | Performed by: INTERNAL MEDICINE

## 2025-01-27 PROCEDURE — 3008F BODY MASS INDEX DOCD: CPT | Mod: CPTII,S$GLB,, | Performed by: INTERNAL MEDICINE

## 2025-01-27 PROCEDURE — 4010F ACE/ARB THERAPY RXD/TAKEN: CPT | Mod: CPTII,S$GLB,, | Performed by: INTERNAL MEDICINE

## 2025-01-27 PROCEDURE — 99214 OFFICE O/P EST MOD 30 MIN: CPT | Mod: S$GLB,,, | Performed by: INTERNAL MEDICINE

## 2025-01-27 PROCEDURE — 3074F SYST BP LT 130 MM HG: CPT | Mod: CPTII,S$GLB,, | Performed by: INTERNAL MEDICINE

## 2025-01-27 PROCEDURE — 1159F MED LIST DOCD IN RCRD: CPT | Mod: CPTII,S$GLB,, | Performed by: INTERNAL MEDICINE

## 2025-01-27 PROCEDURE — 1160F RVW MEDS BY RX/DR IN RCRD: CPT | Mod: CPTII,S$GLB,, | Performed by: INTERNAL MEDICINE

## 2025-01-27 RX ORDER — ROSUVASTATIN CALCIUM 40 MG/1
40 TABLET, COATED ORAL DAILY
Qty: 90 TABLET | Refills: 3 | Status: SHIPPED | OUTPATIENT
Start: 2025-01-27 | End: 2026-01-22

## 2025-01-27 NOTE — PROGRESS NOTES
Subjective:   Patient ID:  Nanda Colin III is a 59 y.o. male who presents for follow up of Establish Care      HPI: Very pleasant man with a history of multiple PTA/stenting of the iliacs presenting for routine care.  He's followed by Dr. Bay who has him on ASA, Plavix, and Xarelto 2.5 bid.  He's not having claudication now.    He is doing well with no new symptoms or cardiovascular complaints and no change in exercise capacity.  He denies chest discomfort, LABOY, palpitations, PND/orthopnea, lightheadedness and syncope.    BP is 148/89 on intake but 120/78 on my manual assessment.    Stopped smoking in June 2022 after 35 years.    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 Bay's July 2024 History of Present Illness:  Patient is a 59 y.o. male w/ PMH of HTN, HLD, obesity, and PAD s/p bilateral iliac stenting x3 presents with worsening right leg claudication with walking.  Recently while working in [place] was evaluated by a vascular surgeon and underwent angiogram with bilateral common iliac stent placements on 11/17/2023.  Following this appointment his ABIs were 1 in bilateral legs, and said his symptoms were greatly improved.  However in the past 5 weeks he has noticed a significant increase in the claudication on his right leg to the point where he can only walk less than 100 ft.  Repeat ABIs today show RLE  REGLA of 0.54 and LLE REGLA of 1.09.     Was recently switched to aspirin, Xarelto, and statin.  Was previously on Plavix, however his medications were adjusted since his most recent stent placement.     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      Dr. Azevedo's Jan 2024 Note:  HPI  The  patient is a 58 year old male followed by Dr Weber with PAD post PTCA [ Dr Bay], coronary calcifations, hyperlipidemia and hypertension. He is post repete PTCA in November in [facility]. He now pain free. 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 He has not history od exertional chest pain. He has stopped smoking 1 year ago. He reports myalgia with high dose rosuvastatin.        RADIOGRAPHIC FINDINGS: 11/17/23    Total occlusion of the entire right common iliac artery stent extending   from the proximal portion of the stent in the aorta down to the distal   portion just proximal to the takeoff of the right hypogastric artery.     Patient Active Problem List   Diagnosis    Erectile dysfunction due to arterial insufficiency    Hypertension, essential    Hyperlipidemia    Colon polyps    BPH with urinary obstruction    Gastroesophageal reflux disease    GINNA (obstructive sleep apnea)    Laceration of left hand without foreign body    Lumbar radiculopathy    PAD (peripheral artery disease)    Personal history of nicotine dependence    Coronary artery calcification seen on CT scan    Lung granuloma    Mediastinal adenopathy    Obesity (BMI 30.0-34.9)       Current Outpatient Medications   Medication Sig    aspirin (ECOTRIN) 81 MG EC tablet Take 81 mg by mouth.    clopidogreL (PLAVIX) 75 mg tablet Take 1 tablet (75 mg total) by  mouth once daily.    losartan (COZAAR) 25 MG tablet Take 1 tablet (25 mg total) by mouth once daily.    rosuvastatin (CRESTOR) 20 MG tablet Take 1 tablet (20 mg total) by mouth once daily.    sildenafiL (VIAGRA) 100 MG tablet Take 1 tablet (100 mg total) by mouth daily as needed for Erectile Dysfunction.    silodosin (RAPAFLO) 8 mg Cap capsule Take 8 mg by mouth.    XARELTO 2.5 mg Tab Take 1 tablet by mouth 2 (two) times daily.    finasteride (PROSCAR) 5 mg tablet Take 1 tablet (5 mg total) by mouth once daily.     No current facility-administered medications for this visit.       ROS  The review of systems is negative except as above.     Objective:   Physical Exam  Vitals reviewed.   Constitutional:       Appearance: He is well-developed.   HENT:      Head: Normocephalic and atraumatic.   Eyes:      General: No scleral icterus.     Conjunctiva/sclera: Conjunctivae normal.   Neck:      Vascular: No JVD.   Cardiovascular:      Rate and Rhythm: Normal rate and regular rhythm.      Pulses: Intact distal pulses.      Heart sounds: Normal heart sounds. No murmur heard.     No friction rub. No gallop.   Pulmonary:      Effort: Pulmonary effort is normal.      Breath sounds: Normal breath sounds. No wheezing or rales.   Abdominal:      General: Bowel sounds are normal. There is no distension.      Palpations: Abdomen is soft.      Tenderness: There is no abdominal tenderness.   Musculoskeletal:         General: Normal range of motion.      Cervical back: Normal range of motion and neck supple.   Skin:     General: Skin is warm and dry.      Findings: No erythema or rash.   Neurological:      Mental Status: He is alert and oriented to person, place, and time.   Psychiatric:         Behavior: Behavior normal.         Thought Content: Thought content normal.         Judgment: Judgment normal.         Lab Results   Component Value Date    WBC 8.42 06/17/2024    HGB 15.3 06/17/2024    HCT 45.0 06/17/2024    MCV 85 06/17/2024      06/17/2024         Chemistry        Component Value Date/Time     11/16/2024 0845    K 4.3 11/16/2024 0845     11/16/2024 0845    CO2 22 (L) 11/16/2024 0845    BUN 12 11/16/2024 0845    CREATININE 0.9 11/16/2024 0845     11/16/2024 0845        Component Value Date/Time    CALCIUM 9.5 11/16/2024 0845    ALKPHOS 60 11/16/2024 0845    AST 19 11/16/2024 0845    ALT 22 11/16/2024 0845    BILITOT 1.1 (H) 11/16/2024 0845    ESTGFRAFRICA >60.0 02/14/2022 1704    EGFRNONAA >60.0 02/14/2022 1704            Lab Results   Component Value Date    CHOL 161 11/16/2024    CHOL 156 11/24/2023    CHOL 193 01/17/2023     Lab Results   Component Value Date    HDL 31 (L) 11/16/2024    HDL 31 (L) 11/24/2023    HDL 36 (L) 01/17/2023     Lab Results   Component Value Date    LDLCALC 101.8 11/16/2024    LDLCALC 94.2 11/24/2023    LDLCALC 99.6 01/17/2023     Lab Results   Component Value Date    TRIG 141 11/16/2024    TRIG 154 (H) 11/24/2023    TRIG 287 (H) 01/17/2023     Lab Results   Component Value Date    CHOLHDL 19.3 (L) 11/16/2024    CHOLHDL 19.9 (L) 11/24/2023    CHOLHDL 18.7 (L) 01/17/2023       Lab Results   Component Value Date    TSH 2.258 07/14/2020       Lab Results   Component Value Date    HGBA1C 5.5 11/24/2023       Assessment:     1. Other hyperlipidemia    2. PAD (peripheral artery disease)    3. Coronary artery calcification seen on CT scan        Plan:     Continue current medicines but LDL goal is < 55.  Add Repatha because he has extensive vascular disease and is not close to goal LDL despite Crestor 40mg.  Zetia is not sufficient to achieve goal LDL.    Diet/exercise goals reinforced.    F/U 12 months

## 2025-02-17 ENCOUNTER — OFFICE VISIT (OUTPATIENT)
Dept: UROLOGY | Facility: CLINIC | Age: 60
End: 2025-02-17
Payer: COMMERCIAL

## 2025-02-17 VITALS
HEART RATE: 88 BPM | DIASTOLIC BLOOD PRESSURE: 84 MMHG | SYSTOLIC BLOOD PRESSURE: 127 MMHG | HEIGHT: 65 IN | BODY MASS INDEX: 33.66 KG/M2 | WEIGHT: 202 LBS

## 2025-02-17 DIAGNOSIS — N13.8 BPH WITH URINARY OBSTRUCTION: Primary | ICD-10-CM

## 2025-02-17 DIAGNOSIS — N52.01 ERECTILE DYSFUNCTION DUE TO ARTERIAL INSUFFICIENCY: ICD-10-CM

## 2025-02-17 DIAGNOSIS — N40.1 BPH WITH URINARY OBSTRUCTION: Primary | ICD-10-CM

## 2025-02-17 DIAGNOSIS — I10 HYPERTENSION, ESSENTIAL: ICD-10-CM

## 2025-02-17 DIAGNOSIS — E78.49 OTHER HYPERLIPIDEMIA: ICD-10-CM

## 2025-02-17 PROBLEM — S61.412A LACERATION OF LEFT HAND WITHOUT FOREIGN BODY: Status: RESOLVED | Noted: 2021-08-07 | Resolved: 2025-02-17

## 2025-02-17 RX ORDER — SILODOSIN 8 MG/1
8 CAPSULE ORAL DAILY
Qty: 90 CAPSULE | Refills: 3 | Status: SHIPPED | OUTPATIENT
Start: 2025-02-17 | End: 2026-02-17

## 2025-02-17 RX ORDER — FINASTERIDE 5 MG/1
5 TABLET, FILM COATED ORAL DAILY
Qty: 90 TABLET | Refills: 3 | Status: SHIPPED | OUTPATIENT
Start: 2025-02-17 | End: 2026-02-17

## 2025-02-17 NOTE — PROGRESS NOTES
CHIEF COMPLAINT:    Mr. Colin is a 59 y.o. male presenting with LUTS.    PRESENTING ILLNESS:    Nanda Colin III is a 59 y.o. male who c/o progressive LUTS. He has nocturia x 2.  + decreased FOS, + straining, + hesitancy. He's on Rapaflo which initially worked well, but his LUTS worsened.   Was then started on finasteride.  He follows up on finasteride and Rapaflo and is pleased with how he's voiding.  Nocturia is now x 1.      He has ED.  Gets good results with Viagra.    REVIEW OF SYSTEMS:    Nanda Colin III denies headache, blurred vision, fever, nausea, vomiting, chills, abdominal pain, chest pain, sore throat, bleeding per rectum, cough, SOB, recent loss of consciousness, recent mental status changes, seizures, dizziness, or upper or lower extremity weakness.    ERIK  1. 1  2. 2  3. 1  4. 1  5. 1     PATIENT HISTORY:    Past Medical History:   Diagnosis Date    BPH (benign prostatic hypertrophy)     Family history of prostate cancer 8/5/2021    brother    Hernia     Hyperlipidemia     Hypertension, essential 12/30/2015    Incarcerated umbilical hernia 6/25/2016    Umbilical hernia        Past Surgical History:   Procedure Laterality Date    ANGIOGRAPHY Bilateral 8/9/2024    Procedure: ANGIOGRAM;  Surgeon: Ruperto Bay MD;  Location: [address];  Service: Vascular;  Laterality: Bilateral;    ANGIOGRAPHY OF LOWER EXTREMITY Bilateral 11/9/2022    Procedure: Angiogram Extremity Unilateral;  Surgeon: Ruperto Bay MD;  Location: [address];  Service: Vascular;  Laterality: Bilateral;  3.7 min  1067.83 mGy  125.26 Gy.cm  104ml Dye    AORTOGRAPHY N/A 11/9/2022    Procedure: AORTOGRAM;  Surgeon: Ruperto Bay MD;  Location: [address];  Service: Vascular;  Laterality: N/A;    AORTOGRAPHY Bilateral 8/9/2024    Procedure: AORTOGRAM WITH PTA STENT /ILLIAC STENTS;  Surgeon: Ruperto Bay MD;  Location: [address];  Service: Vascular;  Laterality: Bilateral;  2287.62  mgy  295.16  gycm2  fluoro time 12.9 min  contrast 50    AORTOGRAPHY WITH SERIALOGRAPHY N/A 3/15/2022    Procedure: AORTOGRAM, WITH SERIALOGRAPHY;  Surgeon: Ruperto Bay MD;  Location: [facility] OR 2ND FLR;  Service: Vascular;  Laterality: N/A;  aortogram w/ runoff & PTA,    COLONOSCOPY N/A 1/14/2019    Procedure: COLONOSCOPY;  Surgeon: Fritz Franco MD;  Location: [facility] ENDO (4TH FLR);  Service: Endoscopy;  Laterality: N/A;  Do not cancel this order    COLONOSCOPY N/A 2/13/2023    Procedure: COLONOSCOPY;  Surgeon: Ja Alexander MD;  Location: [facility] ENDO (4TH FLR);  Service: Endoscopy;  Laterality: N/A;  inst portal-any [id number] to hold plavix see te 11/18/22  Precall done 2/6-AA    HERNIA REPAIR      PTA, ILIAC ARTERY  8/9/2024    Procedure: PTA, ILIAC ARTERY;  Surgeon: Ruperto Bay MD;  Location: [facility] OR [facility] FLR;  Service: Vascular;;    STENT, ILIAC ARTERY  8/9/2024    Procedure: STENT, ILIAC ARTERY;  Surgeon: Ruperto Bay MD;  Location: [facility] OR 2ND FLR;  Service: Vascular;;    TRANSFORAMINAL EPIDURAL INJECTION OF STEROID Right 12/15/2021    Procedure: Injection,steroid,epidural,transforaminal approach Right L4 & L5 (May change depending MRI);  Surgeon: Stacey Mota MD;  Location: [facility] PAIN [facility];  Service: Pain Management;  Laterality: Right;    VASECTOMY      WISDOM TOOTH EXTRACTION         Family History   Problem Relation Name Age of Onset    Stroke Father      Heart disease Paternal Uncle      Kidney disease Paternal Uncle      Depression Paternal Uncle      Cancer Maternal Grandfather      COPD Maternal Grandfather      Cancer Paternal Grandmother      COPD Paternal Aunt         Social History     Socioeconomic History    Marital status:      Spouse name: Lakia    Number of children: 2   Occupational History    Occupation: superintendent     Comment:  [employer]     Employer: [employer]   Tobacco Use    Smoking status: Former     Current packs/day: 0.00     Average packs/day: 1 pack/day for 39.3  years (39.3 ttl pk-yrs)     Types: Cigarettes     Start date:      Quit date: 2023     Years since quittin.8     Passive exposure: Past    Smokeless tobacco: Never   Substance and Sexual Activity    Alcohol use: Yes     Alcohol/week: 0.0 standard drinks of alcohol     Comment: socially    Drug use: No    Sexual activity: Yes     Partners: Female   Social History Narrative    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     Social Drivers of Health     Financial Resource Strain: Low Risk  (6/10/2024)    Overall Financial Resource Strain (CARDIA)     Difficulty of Paying Living Expenses: Not hard at all   Food Insecurity: No Food Insecurity (6/10/2024)    Hunger Vital Sign     Worried About Running Out of Food in the Last Year: Never true     Ran Out of Food in the Last Year: Never true   Transportation Needs: No Transportation Needs (2024)    Received from [facility] Transportation     Lack of Transportation (Medical): No     Lack of Transportation (Non-Medical): No   Physical Activity: Unknown (6/10/2024)    Exercise Vital Sign     Days of Exercise per Week: 0 days   Stress: No Stress Concern Present (6/10/2024)    [employer] - Occupational Stress Questionnaire     Feeling of Stress : Only a little   Housing Stability: Unknown (6/10/2024)    Housing Stability Vital Sign     Unable to Pay for Housing in the Last Year: No       Allergies:  Adhesive    Medications:    Current Outpatient Medications:     aspirin (ECOTRIN) 81 MG EC tablet, Take 81 mg by mouth., Disp: , Rfl:     clopidogreL (PLAVIX) 75 mg tablet, Take 1 tablet (75 mg total) by mouth once daily., Disp: 90 tablet, Rfl: 3    evolocumab (REPATHA SURECLICK) 140 mg/mL PnIj, Inject 1 mL (140 mg total) into the skin every 14 (fourteen) days., Disp: 6 mL, Rfl: 3    losartan (COZAAR) 25 MG tablet, Take 1 tablet (25 mg total) by mouth once daily., Disp: 90 tablet, Rfl: 3    rosuvastatin (CRESTOR) 40 MG Tab, Take  1 tablet (40 mg total) by mouth once daily., Disp: 90 tablet, Rfl: 3    sildenafiL (VIAGRA) 100 MG tablet, Take 1 tablet (100 mg total) by mouth daily as needed for Erectile Dysfunction., Disp: 30 tablet, Rfl: 2    silodosin (RAPAFLO) 8 mg Cap capsule, Take 8 mg by mouth., Disp: , Rfl:     XARELTO 2.5 mg Tab, Take 1 tablet by mouth 2 (two) times daily., Disp: , Rfl:     finasteride (PROSCAR) 5 mg tablet, Take 1 tablet (5 mg total) by mouth once daily., Disp: 90 tablet, Rfl: 3    PHYSICAL EXAMINATION:    The patient generally appears in good health, is appropriately interactive, and is in no apparent distress.     Eyes: anicteric sclerae, moist conjunctivae; no lid-lag; PERRLA     HENT: Atraumatic; oropharynx clear with moist mucous membranes and no mucosal ulcerations;normal hard and soft palate.  No evidence of lymphadenopathy.    Neck: Trachea midline.  No thyromegaly.    Skin: No lesions.    Mental: Cooperative with normal affect.  Is oriented to time, place, and person.    Neuro: Grossly intact.    Chest: Normal inspiratory effort.   No accessory muscles.  No audible wheezes.  Respirations symmetric on inspiration and expiration.    Heart: Regular rhythm.      Abdomen:  Soft, non-tender. No masses or organomegaly. Bladder is not palpable. No evidence of flank discomfort. No evidence of inguinal hernia.    Genitourinary: The penis is circumcised with no evidence of plaques or induration. The urethral meatus is normal. The testes, epididymides, and cord structures are normal in size and contour bilaterally. The scrotum is normal in size and contour.    Normal anal sphincter tone. No rectal mass.    The prostate is 30 g. Normal landmarks. Lateral sulci. Median furrow intact.  No nodularity or induration. Seminal vesicles are normal.    Extremities: No clubbing, cyanosis, or edema      LABS:    On finasteride since 2024  Lab Results   Component Value Date    PSA 0.94 11/16/2024    PSA 0.95 11/24/2023    PSA 2.3  10/10/2022       IMPRESSION:    Encounter Diagnoses   Name Primary?    BPH with urinary obstruction Yes    Erectile dysfunction due to arterial insufficiency     Hypertension, essential     Other hyperlipidemia      HTN, stable  Hyperlipidemia, stable      PLAN:    1. Discussed options for his LUTS.  He's not interested in rezum at this time. Continue finasteride and Rapaflo.  Continue flomax.  Refilled.  2. Discussed options for his ED (affected by above comorbidities).  Continue Viagra.  Prescribed by PCP.  3. RTC 1 year to see an DAKSHA.    Copy to:

## 2025-03-27 ENCOUNTER — PATIENT MESSAGE (OUTPATIENT)
Dept: ADMINISTRATIVE | Facility: OTHER | Age: 60
End: 2025-03-27
Payer: COMMERCIAL

## 2025-08-05 ENCOUNTER — PATIENT MESSAGE (OUTPATIENT)
Dept: VASCULAR SURGERY | Facility: CLINIC | Age: 60
End: 2025-08-05
Payer: COMMERCIAL

## 2025-08-05 DIAGNOSIS — I73.9 PAD (PERIPHERAL ARTERY DISEASE): Primary | ICD-10-CM

## 2025-08-05 RX ORDER — CLOPIDOGREL BISULFATE 75 MG/1
75 TABLET ORAL DAILY
Qty: 30 TABLET | Refills: 0 | Status: SHIPPED | OUTPATIENT
Start: 2025-08-05 | End: 2025-09-04

## 2025-08-16 ENCOUNTER — OFFICE VISIT (OUTPATIENT)
Dept: URGENT CARE | Facility: CLINIC | Age: 60
End: 2025-08-16
Payer: COMMERCIAL

## 2025-08-16 VITALS
BODY MASS INDEX: 33.66 KG/M2 | HEART RATE: 100 BPM | HEIGHT: 65 IN | TEMPERATURE: 98 F | RESPIRATION RATE: 18 BRPM | WEIGHT: 202 LBS | DIASTOLIC BLOOD PRESSURE: 89 MMHG | SYSTOLIC BLOOD PRESSURE: 134 MMHG | OXYGEN SATURATION: 99 %

## 2025-08-16 DIAGNOSIS — R10.32 LEFT LOWER QUADRANT PAIN: Primary | ICD-10-CM

## 2025-08-16 DIAGNOSIS — R10.32 ABDOMINAL PAIN, LEFT LOWER QUADRANT: ICD-10-CM

## 2025-08-16 LAB
ABSOLUTE EOSINOPHIL (OHS): 0.1 K/UL
ABSOLUTE MONOCYTE (OHS): 1 K/UL (ref 0.3–1)
ABSOLUTE NEUTROPHIL COUNT (OHS): 12.2 K/UL (ref 1.8–7.7)
BASOPHILS # BLD AUTO: 0.06 K/UL
BASOPHILS NFR BLD AUTO: 0.4 %
ERYTHROCYTE [DISTWIDTH] IN BLOOD BY AUTOMATED COUNT: 13.6 % (ref 11.5–14.5)
HCT VFR BLD AUTO: 46.6 % (ref 40–54)
HGB BLD-MCNC: 16 GM/DL (ref 14–18)
IMM GRANULOCYTES # BLD AUTO: 0.05 K/UL (ref 0–0.04)
IMM GRANULOCYTES NFR BLD AUTO: 0.3 % (ref 0–0.5)
LYMPHOCYTES # BLD AUTO: 1.48 K/UL (ref 1–4.8)
MCH RBC QN AUTO: 28.6 PG (ref 27–31)
MCHC RBC AUTO-ENTMCNC: 34.3 G/DL (ref 32–36)
MCV RBC AUTO: 83 FL (ref 82–98)
NUCLEATED RBC (/100WBC) (OHS): 0 /100 WBC
PLATELET # BLD AUTO: 293 K/UL (ref 150–450)
PMV BLD AUTO: 10.6 FL (ref 9.2–12.9)
RBC # BLD AUTO: 5.6 M/UL (ref 4.6–6.2)
RELATIVE EOSINOPHIL (OHS): 0.7 %
RELATIVE LYMPHOCYTE (OHS): 9.9 % (ref 18–48)
RELATIVE MONOCYTE (OHS): 6.7 % (ref 4–15)
RELATIVE NEUTROPHIL (OHS): 82 % (ref 38–73)
WBC # BLD AUTO: 14.89 K/UL (ref 3.9–12.7)

## 2025-08-16 PROCEDURE — 85025 COMPLETE CBC W/AUTO DIFF WBC: CPT | Performed by: FAMILY MEDICINE

## 2025-08-16 PROCEDURE — 99214 OFFICE O/P EST MOD 30 MIN: CPT | Mod: S$GLB,,, | Performed by: FAMILY MEDICINE

## 2025-08-16 RX ORDER — CIPROFLOXACIN 500 MG/1
500 TABLET, FILM COATED ORAL 2 TIMES DAILY
Qty: 14 TABLET | Refills: 0 | Status: SHIPPED | OUTPATIENT
Start: 2025-08-16 | End: 2025-08-23

## 2025-08-16 RX ORDER — METRONIDAZOLE 500 MG/1
500 TABLET ORAL EVERY 12 HOURS
Qty: 14 TABLET | Refills: 0 | Status: SHIPPED | OUTPATIENT
Start: 2025-08-16 | End: 2025-08-23

## 2025-08-21 ENCOUNTER — HOSPITAL ENCOUNTER (OUTPATIENT)
Dept: VASCULAR SURGERY | Facility: CLINIC | Age: 60
Discharge: HOME | End: 2025-08-21
Attending: SURGERY
Payer: COMMERCIAL

## 2025-08-21 ENCOUNTER — OFFICE VISIT (OUTPATIENT)
Dept: VASCULAR SURGERY | Facility: CLINIC | Age: 60
End: 2025-08-21
Attending: SURGERY
Payer: COMMERCIAL

## 2025-08-21 VITALS
BODY MASS INDEX: 33.06 KG/M2 | HEIGHT: 65 IN | DIASTOLIC BLOOD PRESSURE: 85 MMHG | SYSTOLIC BLOOD PRESSURE: 133 MMHG | HEART RATE: 83 BPM | TEMPERATURE: 98 F | WEIGHT: 198.44 LBS

## 2025-08-21 DIAGNOSIS — I73.9 PAD (PERIPHERAL ARTERY DISEASE): ICD-10-CM

## 2025-08-21 DIAGNOSIS — I73.9 PAD (PERIPHERAL ARTERY DISEASE): Primary | ICD-10-CM

## 2025-08-21 PROCEDURE — 99212 OFFICE O/P EST SF 10 MIN: CPT | Mod: S$GLB,,, | Performed by: SURGERY

## 2025-08-21 PROCEDURE — 4010F ACE/ARB THERAPY RXD/TAKEN: CPT | Mod: CPTII,S$GLB,, | Performed by: SURGERY

## 2025-08-21 PROCEDURE — 3075F SYST BP GE 130 - 139MM HG: CPT | Mod: CPTII,S$GLB,, | Performed by: SURGERY

## 2025-08-21 PROCEDURE — 93923 UPR/LXTR ART STDY 3+ LVLS: CPT | Mod: S$GLB,,, | Performed by: SURGERY

## 2025-08-21 PROCEDURE — 3079F DIAST BP 80-89 MM HG: CPT | Mod: CPTII,S$GLB,, | Performed by: SURGERY

## 2025-08-21 PROCEDURE — 99999 PR PBB SHADOW E&M-EST. PATIENT-LVL III: CPT | Mod: PBBFAC,,, | Performed by: SURGERY

## 2025-08-21 PROCEDURE — 3008F BODY MASS INDEX DOCD: CPT | Mod: CPTII,S$GLB,, | Performed by: SURGERY

## 2025-08-21 PROCEDURE — 1159F MED LIST DOCD IN RCRD: CPT | Mod: CPTII,S$GLB,, | Performed by: SURGERY

## 2025-08-21 RX ORDER — ROSUVASTATIN CALCIUM 20 MG/1
20 TABLET, COATED ORAL
COMMUNITY
Start: 2025-04-05

## 2025-09-03 DIAGNOSIS — I73.9 PAD (PERIPHERAL ARTERY DISEASE): Primary | ICD-10-CM

## 2025-09-03 DIAGNOSIS — I73.9 PAD (PERIPHERAL ARTERY DISEASE): ICD-10-CM

## 2025-09-05 DIAGNOSIS — I73.9 PAD (PERIPHERAL ARTERY DISEASE): ICD-10-CM

## 2025-09-05 RX ORDER — CLOPIDOGREL BISULFATE 75 MG/1
75 TABLET ORAL DAILY
Qty: 30 TABLET | Refills: 11 | Status: SHIPPED | OUTPATIENT
Start: 2025-09-05 | End: 2026-08-31

## (undated) DEVICE — DRAPE U SPLIT SHEET 54X76IN

## (undated) DEVICE — SET DECANTER MEDICHOICE

## (undated) DEVICE — Device

## (undated) DEVICE — VISE RADIFOCUS MULTI TORQUE

## (undated) DEVICE — DEVICE CLOSURE MYNX GRIP 5FR

## (undated) DEVICE — KIT INTRO MICRO NIT VSI 4FR

## (undated) DEVICE — CATH GLIDE ANGLED 5FR 65CM

## (undated) DEVICE — CATH 5FR OMNIFLUSH 65CM .038

## (undated) DEVICE — DRESSING TRANS 4X4 TEGADERM

## (undated) DEVICE — BLLN ARMADA 35 6X40X80

## (undated) DEVICE — SYR MARK 7 ARTERION 150ML

## (undated) DEVICE — INFLATOR ENCORE

## (undated) DEVICE — GUIDEWIRE STF .035X260CM ANG

## (undated) DEVICE — COVER INSTR ELASTIC BAND 40X20

## (undated) DEVICE — INTRODUCER R/O II 7FX10 W/.038

## (undated) DEVICE — CONTRAST FLEXCIL INJECTION

## (undated) DEVICE — INTRODUCER VASC RADPQ 5FRX10CM

## (undated) DEVICE — SPONGE DERMACEA 4X4IN 12PLY

## (undated) DEVICE — CATH BLLN DURADO 7 X 4

## (undated) DEVICE — DEVICE MYNX GRIP 6/7F

## (undated) DEVICE — GUIDEWIRE ROSEN VAS JTIP 180CM

## (undated) DEVICE — DECANTER FLUID TRNSF WHITE 9IN

## (undated) DEVICE — SYR MED RAD 150ML

## (undated) DEVICE — SYS LABEL CORRECT MED

## (undated) DEVICE — SET MICROPUNCTURE

## (undated) DEVICE — SOL NS 1000CC

## (undated) DEVICE — CATH PERFORMA IMA .046IN 65CM

## (undated) DEVICE — BLLN MUSTANG 5X60X75

## (undated) DEVICE — SUT PERCLOSE PROSTYLE MEDIATE

## (undated) DEVICE — GUIDEWIRE STF .035X180CM ANG

## (undated) DEVICE — STOPCOCK 3 WAY MED PRESSURE

## (undated) DEVICE — TUBING HIGH PRESSURE

## (undated) DEVICE — SOL 9P NACL IRR PIC IL

## (undated) DEVICE — CATH ULTRAVERSE 035 6X60X75

## (undated) DEVICE — DRESSING TEGADERM 2X2 3/4

## (undated) DEVICE — DRAPE PLASTIC U 60X72

## (undated) DEVICE — INTRODUCER VASC RADPQ 6FRX10CM

## (undated) DEVICE — TUBING CNTRST INJ ADPT 72IN

## (undated) DEVICE — COVERS PROBE NR-48 STERILE

## (undated) DEVICE — GUIDEWIRE STF .035X180CM STR